# Patient Record
Sex: FEMALE | Race: BLACK OR AFRICAN AMERICAN | NOT HISPANIC OR LATINO | Employment: FULL TIME | URBAN - METROPOLITAN AREA
[De-identification: names, ages, dates, MRNs, and addresses within clinical notes are randomized per-mention and may not be internally consistent; named-entity substitution may affect disease eponyms.]

---

## 2018-01-30 ENCOUNTER — APPOINTMENT (OUTPATIENT)
Dept: RADIOLOGY | Facility: MEDICAL CENTER | Age: 23
End: 2018-01-30
Attending: EMERGENCY MEDICINE
Payer: COMMERCIAL

## 2018-01-30 ENCOUNTER — HOSPITAL ENCOUNTER (EMERGENCY)
Facility: MEDICAL CENTER | Age: 23
End: 2018-01-30
Attending: EMERGENCY MEDICINE
Payer: COMMERCIAL

## 2018-01-30 VITALS
WEIGHT: 173.28 LBS | HEIGHT: 67 IN | HEART RATE: 84 BPM | DIASTOLIC BLOOD PRESSURE: 81 MMHG | RESPIRATION RATE: 16 BRPM | OXYGEN SATURATION: 96 % | SYSTOLIC BLOOD PRESSURE: 128 MMHG | TEMPERATURE: 98.3 F | BODY MASS INDEX: 27.2 KG/M2

## 2018-01-30 DIAGNOSIS — O21.0 HYPEREMESIS GRAVIDARUM: ICD-10-CM

## 2018-01-30 LAB
ALBUMIN SERPL BCP-MCNC: 4.8 G/DL (ref 3.2–4.9)
ALBUMIN/GLOB SERPL: 1.7 G/DL
ALP SERPL-CCNC: 43 U/L (ref 30–99)
ALT SERPL-CCNC: 16 U/L (ref 2–50)
ANION GAP SERPL CALC-SCNC: 12 MMOL/L (ref 0–11.9)
APPEARANCE UR: CLEAR
AST SERPL-CCNC: 17 U/L (ref 12–45)
B-HCG SERPL-ACNC: ABNORMAL MIU/ML (ref 0–5)
BASOPHILS # BLD AUTO: 0.3 % (ref 0–1.8)
BASOPHILS # BLD: 0.05 K/UL (ref 0–0.12)
BILIRUB SERPL-MCNC: 0.7 MG/DL (ref 0.1–1.5)
BILIRUB UR QL STRIP.AUTO: NEGATIVE
BUN SERPL-MCNC: 8 MG/DL (ref 8–22)
CALCIUM SERPL-MCNC: 9.4 MG/DL (ref 8.5–10.5)
CHLORIDE SERPL-SCNC: 102 MMOL/L (ref 96–112)
CO2 SERPL-SCNC: 20 MMOL/L (ref 20–33)
COLOR UR: YELLOW
CREAT SERPL-MCNC: 0.7 MG/DL (ref 0.5–1.4)
CULTURE IF INDICATED INDCX: NO UA CULTURE
EOSINOPHIL # BLD AUTO: 0.03 K/UL (ref 0–0.51)
EOSINOPHIL NFR BLD: 0.2 % (ref 0–6.9)
ERYTHROCYTE [DISTWIDTH] IN BLOOD BY AUTOMATED COUNT: 38.5 FL (ref 35.9–50)
GLOBULIN SER CALC-MCNC: 2.8 G/DL (ref 1.9–3.5)
GLUCOSE SERPL-MCNC: 75 MG/DL (ref 65–99)
GLUCOSE UR STRIP.AUTO-MCNC: 500 MG/DL
HCT VFR BLD AUTO: 43.7 % (ref 37–47)
HGB BLD-MCNC: 15 G/DL (ref 12–16)
IMM GRANULOCYTES # BLD AUTO: 0.08 K/UL (ref 0–0.11)
IMM GRANULOCYTES NFR BLD AUTO: 0.6 % (ref 0–0.9)
KETONES UR STRIP.AUTO-MCNC: 80 MG/DL
LEUKOCYTE ESTERASE UR QL STRIP.AUTO: NEGATIVE
LYMPHOCYTES # BLD AUTO: 1.74 K/UL (ref 1–4.8)
LYMPHOCYTES NFR BLD: 12.2 % (ref 22–41)
MCH RBC QN AUTO: 28.9 PG (ref 27–33)
MCHC RBC AUTO-ENTMCNC: 34.3 G/DL (ref 33.6–35)
MCV RBC AUTO: 84.2 FL (ref 81.4–97.8)
MICRO URNS: ABNORMAL
MONOCYTES # BLD AUTO: 0.7 K/UL (ref 0–0.85)
MONOCYTES NFR BLD AUTO: 4.9 % (ref 0–13.4)
NEUTROPHILS # BLD AUTO: 11.71 K/UL (ref 2–7.15)
NEUTROPHILS NFR BLD: 81.8 % (ref 44–72)
NITRITE UR QL STRIP.AUTO: NEGATIVE
NRBC # BLD AUTO: 0 K/UL
NRBC BLD-RTO: 0 /100 WBC
NUMBER OF RH DOSES IND 8505RD: NORMAL
PH UR STRIP.AUTO: 6 [PH]
PLATELET # BLD AUTO: 315 K/UL (ref 164–446)
PMV BLD AUTO: 9.1 FL (ref 9–12.9)
POTASSIUM SERPL-SCNC: 3.8 MMOL/L (ref 3.6–5.5)
PROT SERPL-MCNC: 7.6 G/DL (ref 6–8.2)
PROT UR QL STRIP: NEGATIVE MG/DL
RBC # BLD AUTO: 5.19 M/UL (ref 4.2–5.4)
RBC UR QL AUTO: NEGATIVE
RH BLD: NORMAL
SODIUM SERPL-SCNC: 134 MMOL/L (ref 135–145)
SP GR UR STRIP.AUTO: 1.03
UROBILINOGEN UR STRIP.AUTO-MCNC: 0.2 MG/DL
WBC # BLD AUTO: 14.3 K/UL (ref 4.8–10.8)

## 2018-01-30 PROCEDURE — 36415 COLL VENOUS BLD VENIPUNCTURE: CPT

## 2018-01-30 PROCEDURE — 99285 EMERGENCY DEPT VISIT HI MDM: CPT

## 2018-01-30 PROCEDURE — 700105 HCHG RX REV CODE 258: Performed by: EMERGENCY MEDICINE

## 2018-01-30 PROCEDURE — 700111 HCHG RX REV CODE 636 W/ 250 OVERRIDE (IP): Performed by: EMERGENCY MEDICINE

## 2018-01-30 PROCEDURE — 86901 BLOOD TYPING SEROLOGIC RH(D): CPT

## 2018-01-30 PROCEDURE — 85025 COMPLETE CBC W/AUTO DIFF WBC: CPT

## 2018-01-30 PROCEDURE — 96375 TX/PRO/DX INJ NEW DRUG ADDON: CPT

## 2018-01-30 PROCEDURE — 81003 URINALYSIS AUTO W/O SCOPE: CPT

## 2018-01-30 PROCEDURE — 76817 TRANSVAGINAL US OBSTETRIC: CPT

## 2018-01-30 PROCEDURE — 96361 HYDRATE IV INFUSION ADD-ON: CPT

## 2018-01-30 PROCEDURE — 84702 CHORIONIC GONADOTROPIN TEST: CPT

## 2018-01-30 PROCEDURE — 80053 COMPREHEN METABOLIC PANEL: CPT

## 2018-01-30 PROCEDURE — 96374 THER/PROPH/DIAG INJ IV PUSH: CPT

## 2018-01-30 RX ORDER — METOCLOPRAMIDE HYDROCHLORIDE 5 MG/ML
10 INJECTION INTRAMUSCULAR; INTRAVENOUS ONCE
Status: COMPLETED | OUTPATIENT
Start: 2018-01-30 | End: 2018-01-30

## 2018-01-30 RX ORDER — ONDANSETRON 4 MG/1
4 TABLET, ORALLY DISINTEGRATING ORAL EVERY 8 HOURS PRN
Qty: 10 TAB | Refills: 0 | Status: SHIPPED | OUTPATIENT
Start: 2018-01-30 | End: 2023-01-18 | Stop reason: SDUPTHER

## 2018-01-30 RX ORDER — DEXTROSE, SODIUM CHLORIDE, SODIUM LACTATE, POTASSIUM CHLORIDE, AND CALCIUM CHLORIDE 5; .6; .31; .03; .02 G/100ML; G/100ML; G/100ML; G/100ML; G/100ML
INJECTION, SOLUTION INTRAVENOUS ONCE
Status: COMPLETED | OUTPATIENT
Start: 2018-01-30 | End: 2018-01-30

## 2018-01-30 RX ORDER — SODIUM CHLORIDE 9 MG/ML
1000 INJECTION, SOLUTION INTRAVENOUS ONCE
Status: COMPLETED | OUTPATIENT
Start: 2018-01-30 | End: 2018-01-30

## 2018-01-30 RX ORDER — ONDANSETRON 2 MG/ML
4 INJECTION INTRAMUSCULAR; INTRAVENOUS ONCE
Status: COMPLETED | OUTPATIENT
Start: 2018-01-30 | End: 2018-01-30

## 2018-01-30 RX ADMIN — ONDANSETRON 4 MG: 2 INJECTION INTRAMUSCULAR; INTRAVENOUS at 15:36

## 2018-01-30 RX ADMIN — SODIUM CHLORIDE, SODIUM LACTATE, POTASSIUM CHLORIDE, CALCIUM CHLORIDE AND DEXTROSE MONOHYDRATE: 5; 600; 310; 30; 20 INJECTION, SOLUTION INTRAVENOUS at 18:15

## 2018-01-30 RX ADMIN — SODIUM CHLORIDE 1000 ML: 9 INJECTION, SOLUTION INTRAVENOUS at 20:25

## 2018-01-30 RX ADMIN — METOCLOPRAMIDE 10 MG: 5 INJECTION, SOLUTION INTRAMUSCULAR; INTRAVENOUS at 18:15

## 2018-01-30 RX ADMIN — SODIUM CHLORIDE 1000 ML: 9 INJECTION, SOLUTION INTRAVENOUS at 15:36

## 2018-01-30 ASSESSMENT — PAIN SCALES - GENERAL: PAINLEVEL_OUTOF10: 6

## 2018-01-30 NOTE — ED TRIAGE NOTES
"Chief Complaint   Patient presents with   • Pregnancy     6/8 weeks, + spotting. with N/V and unable to keep food down for several days.    • N/V   • Cramping     Pt reports blood in vomit, states that is about \"2 teaspoons.\"   Blood pressure 139/86, pulse 95, temperature 36.8 °C (98.3 °F), resp. rate 18, height 1.702 m (5' 7\"), weight 78.6 kg (173 lb 4.5 oz), SpO2 97 %.    Pt informed of wait times. Educated on triage process.  Asked to return to triage RN for any new or worsening of symptoms. Thanked for patience.        "

## 2018-01-30 NOTE — ED PROVIDER NOTES
ED Provider Note    CHIEF COMPLAINT  Chief Complaint   Patient presents with   • Pregnancy     6/8 weeks, + spotting. with N/V and unable to keep food down for several days.    • N/V   • Cramping       HPI  Shy Buchanan is a 22 y.o. female G1, P0, approximately 16 weeks pregnant who presents with complaints of nausea, vomiting for the past 6 days, but has been able to keep some fluids down.  She has been feeling somewhat weak, lightheaded, and fatigue. The patient says she has an appointment see the pregnancy center but hasn't no prenatal care to this point. She says she is also having some spotting and some lower abdominal pain cramping after vomiting. She denies fever, chills, sore throat, cough, congestion, any difficulty breathing. She has had no diarrhea.    REVIEW OF SYSTEMS  See HPI for further details. All other systems are negative.     PAST MEDICAL HISTORY  Past Medical History:   Diagnosis Date   • Healthy adult        FAMILY HISTORY  Family History   Problem Relation Age of Onset   • Lung Disease Mother    • Hypertension Mother    • Lung Disease Father    • Lung Disease Sister    • Hypertension Sister        SOCIAL HISTORY  Social History     Social History   • Marital status: Single     Spouse name: N/A   • Number of children: N/A   • Years of education: N/A     Social History Main Topics   • Smoking status: Never Smoker   • Smokeless tobacco: Never Used   • Alcohol use Yes      Comment: occasional   • Drug use: No   • Sexual activity: Yes     Other Topics Concern   • Not on file     Social History Narrative   • No narrative on file       SURGICAL HISTORY  Past Surgical History:   Procedure Laterality Date   • APPENDECTOMY         CURRENT MEDICATIONS  Home Medications    **Home medications have not yet been reviewed for this encounter**         ALLERGIES  Allergies   Allergen Reactions   • Vicodin [Hydrocodone-Acetaminophen] Vomiting       PHYSICAL EXAM  VITAL SIGNS: /86   Pulse 95   Temp 36.8  "°C (98.3 °F)   Resp 18   Ht 1.702 m (5' 7\")   Wt 78.6 kg (173 lb 4.5 oz)   SpO2 97%   BMI 27.14 kg/m²   Constitutional: Awake, alert, in no acute distress, Non-toxic appearance.   HENT: Atraumatic. Bilateral external ears normal, mucous membranes dry, throat nonerythematous without exudates, nose is normal.  Eyes: PERRL, EOMI, conjunctiva moist, noninjected.  Neck: Nontender, Normal range of motion, No nuchal rigidity, No stridor.   Lymphatic: No lymphadenopathy noted.   Cardiovascular: Regular rate and rhythm, no murmurs, rubs, gallops.  Thorax & Lungs:  Good breath sounds bilaterally, no wheezes, rales, or retractions.  No chest tenderness.  Abdomen: Bowel sounds normal, Soft, nontender, nondistended, no rebound, guarding, masses.  Back: No CVA or spinal tenderness.  Extremities: Intact distal pulses, No edema, No tenderness.   Skin: Warm, Dry, No rashes.   Musculoskeletal: No joint swelling or tenderness.  Neurologic: Alert & oriented x 3, sensory and motor function normal. No focal deficits.   Psychiatric: Affect normal, Judgment normal, Mood normal.           RADIOLOGY/PROCEDURES  US-OB PELVIS TRANSVAGINAL   Final Result         1. Single living intrauterine pregnancy at  6 weeks 3 days estimated gestational age.      2. Heterogeneous area in the lower uterine segment could relate to a blood clot.      3. Probable right corpus luteal cyst.                COURSE & MEDICAL DECISION MAKING  Pertinent Labs & Imaging studies reviewed. (See chart for details)  The patient presents with the above complaints. Clinically she appears dehydrated, likely has hyperemesis gravidarum. IV is placed, she was given a bolus of normal saline, followed by a liter of D5 LR. After discussing the use of Zofran with the patient, and the controversy surrounding possible birth defects, patient was agreeable for dose of Zofran.   After receiving a liter normal saline, she remained nauseous. She was given Reglan 10 mg IV. Patient was " given a 3rd liter of normal saline as she had not urinated. Urine sample was obtained. CBC showed a white count of 14,300, 82% polys, he would open 15.0, chemistry sodium 134, due to 20, anion gap 12, renal function and liver function tests normal, Rh+, beta hCG Quant 77,975.8, urine shows 500 glucose, 80 ketones, otherwise negative. Transvaginal ultrasound shows a viable intrauterine pregnancy with estimated gestational age of 6 weeks, 3 days. On recheck, the patient was feeling much improved. She was tolerating ice chips and oral fluids.  Findings were discussed with the patient. She appears to have hyperemesis gravidarum. She is placed on Zofran for any further nausea. She is to follow strict pelvic rest. She is told to drink plenty of fluids, return to the ER for any worsening symptoms, recurrent vomiting, fever, abdominal pain, heavy bleeding, or any other problems.      FINAL IMPRESSION  1. Hyperemesis gravidarum  2.   3.         Electronically signed by: Devin Mahoney, 1/30/2018 3:11 PM

## 2018-01-31 NOTE — ED NOTES
Pt AOx4.  Pt given discharge instructions and pt verbalized understanding.  Pt ambulated to [facility].

## 2018-01-31 NOTE — DISCHARGE INSTRUCTIONS
Hyperemesis Gravidarum  Hyperemesis gravidarum is a severe form of nausea and vomiting that happens during pregnancy. Hyperemesis is worse than morning sickness. It may cause you to have nausea or vomiting all day for many days. It may keep you from eating and drinking enough food and liquids. Hyperemesis usually occurs during the first half (the first 20 weeks) of pregnancy. It often goes away once a woman is in her second half of pregnancy. However, sometimes hyperemesis continues through an entire pregnancy.   CAUSES   The cause of this condition is not completely known but is thought to be related to changes in the body's hormones when pregnant. It could be from the high level of the pregnancy hormone or an increase in estrogen in the body.   SIGNS AND SYMPTOMS   · Severe nausea and vomiting.  · Nausea that does not go away.  · Vomiting that does not allow you to keep any food down.  · Weight loss and body fluid loss (dehydration).  · Having no desire to eat or not liking food you have previously enjoyed.  DIAGNOSIS   Your health care provider will do a physical exam and ask you about your symptoms. He or she may also order blood tests and urine tests to make sure something else is not causing the problem.   TREATMENT   You may only need medicine to control the problem. If medicines do not control the nausea and vomiting, you will be treated in the hospital to prevent dehydration, increased acid in the blood (acidosis), weight loss, and changes in the electrolytes in your body that may harm the unborn baby (fetus). You may need IV fluids.   HOME CARE INSTRUCTIONS   · Only take over-the-counter or prescription medicines as directed by your health care provider.  · Try eating a couple of dry crackers or toast in the morning before getting out of bed.  · Avoid foods and smells that upset your stomach.  · Avoid fatty and spicy foods.  · Eat 5-6 small meals a day.  · Do not drink when eating meals. Drink between  meals.  · For snacks, eat high-protein foods, such as cheese.  · Eat or suck on things that have thelma in them. Thelma helps nausea.  · Avoid food preparation. The smell of food can spoil your appetite.  · Avoid iron pills and iron in your multivitamins until after 3-4 months of being pregnant. However, consult with your health care provider before stopping any prescribed iron pills.  SEEK MEDICAL CARE IF:   · Your abdominal pain increases.  · You have a severe headache.  · You have vision problems.  · You are losing weight.  SEEK IMMEDIATE MEDICAL CARE IF:   · You are unable to keep fluids down.  · You vomit blood.  · You have constant nausea and vomiting.  · You have excessive weakness.  · You have extreme thirst.  · You have dizziness or fainting.  · You have a fever or persistent symptoms for more than 2-3 days.  · You have a fever and your symptoms suddenly get worse.  MAKE SURE YOU:   · Understand these instructions.  · Will watch your condition.  · Will get help right away if you are not doing well or get worse.     This information is not intended to replace advice given to you by your health care provider. Make sure you discuss any questions you have with your health care provider.     Document Released: 12/18/2006 Document Revised: 10/08/2014 Document Reviewed: 07/30/2014  [employer] Interactive Patient Education ©2016 [employer]

## 2018-02-04 ENCOUNTER — HOSPITAL ENCOUNTER (EMERGENCY)
Facility: MEDICAL CENTER | Age: 23
End: 2018-02-04
Attending: EMERGENCY MEDICINE
Payer: COMMERCIAL

## 2018-02-04 VITALS
BODY MASS INDEX: 26.54 KG/M2 | DIASTOLIC BLOOD PRESSURE: 70 MMHG | SYSTOLIC BLOOD PRESSURE: 133 MMHG | RESPIRATION RATE: 16 BRPM | WEIGHT: 169.09 LBS | OXYGEN SATURATION: 100 % | HEART RATE: 80 BPM | HEIGHT: 67 IN | TEMPERATURE: 98.2 F

## 2018-02-04 DIAGNOSIS — Z3A.01 LESS THAN 8 WEEKS GESTATION OF PREGNANCY: ICD-10-CM

## 2018-02-04 DIAGNOSIS — R11.2 INTRACTABLE VOMITING WITH NAUSEA, UNSPECIFIED VOMITING TYPE: ICD-10-CM

## 2018-02-04 LAB
ALBUMIN SERPL BCP-MCNC: 4.1 G/DL (ref 3.2–4.9)
ALBUMIN/GLOB SERPL: 1.1 G/DL
ALP SERPL-CCNC: 54 U/L (ref 30–99)
ALT SERPL-CCNC: 20 U/L (ref 2–50)
ANION GAP SERPL CALC-SCNC: 8 MMOL/L (ref 0–11.9)
APPEARANCE UR: ABNORMAL
AST SERPL-CCNC: 16 U/L (ref 12–45)
B-HCG SERPL-ACNC: ABNORMAL MIU/ML (ref 0–10)
BACTERIA #/AREA URNS HPF: ABNORMAL /HPF
BASOPHILS # BLD AUTO: 0.4 % (ref 0–1.8)
BASOPHILS # BLD: 0.06 K/UL (ref 0–0.12)
BILIRUB SERPL-MCNC: 1 MG/DL (ref 0.1–1.5)
BILIRUB UR QL STRIP.AUTO: ABNORMAL
BUN SERPL-MCNC: 6 MG/DL (ref 8–22)
CALCIUM SERPL-MCNC: 9.8 MG/DL (ref 8.4–10.2)
CHLORIDE SERPL-SCNC: 102 MMOL/L (ref 96–112)
CO2 SERPL-SCNC: 24 MMOL/L (ref 20–33)
COLOR UR: YELLOW
CREAT SERPL-MCNC: 0.93 MG/DL (ref 0.5–1.4)
CULTURE IF INDICATED INDCX: YES UA CULTURE
EOSINOPHIL # BLD AUTO: 0.15 K/UL (ref 0–0.51)
EOSINOPHIL NFR BLD: 1 % (ref 0–6.9)
EPI CELLS #/AREA URNS HPF: ABNORMAL /HPF
ERYTHROCYTE [DISTWIDTH] IN BLOOD BY AUTOMATED COUNT: 36.9 FL (ref 35.9–50)
GLOBULIN SER CALC-MCNC: 3.6 G/DL (ref 1.9–3.5)
GLUCOSE SERPL-MCNC: 84 MG/DL (ref 65–99)
GLUCOSE UR STRIP.AUTO-MCNC: NEGATIVE MG/DL
HCT VFR BLD AUTO: 42.6 % (ref 37–47)
HGB BLD-MCNC: 15 G/DL (ref 12–16)
IMM GRANULOCYTES # BLD AUTO: 0.04 K/UL (ref 0–0.11)
IMM GRANULOCYTES NFR BLD AUTO: 0.3 % (ref 0–0.9)
KETONES UR STRIP.AUTO-MCNC: 15 MG/DL
LEUKOCYTE ESTERASE UR QL STRIP.AUTO: ABNORMAL
LIPASE SERPL-CCNC: <10 U/L (ref 7–58)
LYMPHOCYTES # BLD AUTO: 2.61 K/UL (ref 1–4.8)
LYMPHOCYTES NFR BLD: 17.6 % (ref 22–41)
MCH RBC QN AUTO: 28.7 PG (ref 27–33)
MCHC RBC AUTO-ENTMCNC: 35.2 G/DL (ref 33.6–35)
MCV RBC AUTO: 81.6 FL (ref 81.4–97.8)
MICRO URNS: ABNORMAL
MONOCYTES # BLD AUTO: 1 K/UL (ref 0–0.85)
MONOCYTES NFR BLD AUTO: 6.7 % (ref 0–13.4)
MUCOUS THREADS #/AREA URNS HPF: ABNORMAL /HPF
NEUTROPHILS # BLD AUTO: 10.99 K/UL (ref 2–7.15)
NEUTROPHILS NFR BLD: 74 % (ref 44–72)
NITRITE UR QL STRIP.AUTO: NEGATIVE
NRBC # BLD AUTO: 0 K/UL
NRBC BLD-RTO: 0 /100 WBC
PH UR STRIP.AUTO: 6 [PH]
PLATELET # BLD AUTO: 309 K/UL (ref 164–446)
PMV BLD AUTO: 8.9 FL (ref 9–12.9)
POTASSIUM SERPL-SCNC: 3.9 MMOL/L (ref 3.6–5.5)
PROT SERPL-MCNC: 7.7 G/DL (ref 6–8.2)
PROT UR QL STRIP: 30 MG/DL
RBC # BLD AUTO: 5.22 M/UL (ref 4.2–5.4)
RBC # URNS HPF: ABNORMAL /HPF
RBC UR QL AUTO: NEGATIVE
SODIUM SERPL-SCNC: 134 MMOL/L (ref 135–145)
SP GR UR REFRACTOMETRY: 1.03
UNIDENT CRYS URNS QL MICRO: ABNORMAL /HPF
WBC # BLD AUTO: 14.9 K/UL (ref 4.8–10.8)
WBC #/AREA URNS HPF: ABNORMAL /HPF

## 2018-02-04 PROCEDURE — 83690 ASSAY OF LIPASE: CPT

## 2018-02-04 PROCEDURE — 84702 CHORIONIC GONADOTROPIN TEST: CPT

## 2018-02-04 PROCEDURE — 85025 COMPLETE CBC W/AUTO DIFF WBC: CPT

## 2018-02-04 PROCEDURE — 96374 THER/PROPH/DIAG INJ IV PUSH: CPT

## 2018-02-04 PROCEDURE — 700105 HCHG RX REV CODE 258: Performed by: EMERGENCY MEDICINE

## 2018-02-04 PROCEDURE — 36415 COLL VENOUS BLD VENIPUNCTURE: CPT

## 2018-02-04 PROCEDURE — 99284 EMERGENCY DEPT VISIT MOD MDM: CPT

## 2018-02-04 PROCEDURE — 700111 HCHG RX REV CODE 636 W/ 250 OVERRIDE (IP): Performed by: EMERGENCY MEDICINE

## 2018-02-04 PROCEDURE — 80053 COMPREHEN METABOLIC PANEL: CPT

## 2018-02-04 PROCEDURE — 81001 URINALYSIS AUTO W/SCOPE: CPT

## 2018-02-04 PROCEDURE — 87086 URINE CULTURE/COLONY COUNT: CPT

## 2018-02-04 RX ORDER — METOCLOPRAMIDE 5 MG/1
5 TABLET ORAL 4 TIMES DAILY
Qty: 20 TAB | Refills: 0 | Status: SHIPPED | OUTPATIENT
Start: 2018-02-04 | End: 2018-02-04

## 2018-02-04 RX ORDER — PROMETHAZINE HYDROCHLORIDE 25 MG/1
25 TABLET ORAL EVERY 6 HOURS PRN
Qty: 20 TAB | Refills: 0 | Status: SHIPPED | OUTPATIENT
Start: 2018-02-04 | End: 2020-09-16

## 2018-02-04 RX ORDER — METOCLOPRAMIDE 5 MG/1
5 TABLET ORAL 4 TIMES DAILY
Qty: 20 TAB | Refills: 0 | Status: SHIPPED | OUTPATIENT
Start: 2018-02-04 | End: 2020-09-16

## 2018-02-04 RX ORDER — SODIUM CHLORIDE 9 MG/ML
1000 INJECTION, SOLUTION INTRAVENOUS ONCE
Status: COMPLETED | OUTPATIENT
Start: 2018-02-04 | End: 2018-02-04

## 2018-02-04 RX ORDER — METOCLOPRAMIDE HYDROCHLORIDE 5 MG/ML
10 INJECTION INTRAMUSCULAR; INTRAVENOUS ONCE
Status: COMPLETED | OUTPATIENT
Start: 2018-02-04 | End: 2018-02-04

## 2018-02-04 RX ORDER — CEPHALEXIN 500 MG/1
500 CAPSULE ORAL 3 TIMES DAILY
Qty: 9 CAP | Refills: 0 | Status: SHIPPED | OUTPATIENT
Start: 2018-02-04 | End: 2018-02-04

## 2018-02-04 RX ORDER — CEPHALEXIN 500 MG/1
500 CAPSULE ORAL 3 TIMES DAILY
Qty: 9 CAP | Refills: 0 | Status: SHIPPED | OUTPATIENT
Start: 2018-02-04 | End: 2018-02-07

## 2018-02-04 RX ADMIN — METOCLOPRAMIDE 10 MG: 5 INJECTION, SOLUTION INTRAMUSCULAR; INTRAVENOUS at 19:45

## 2018-02-04 RX ADMIN — SODIUM CHLORIDE 1000 ML: 9 INJECTION, SOLUTION INTRAVENOUS at 19:45

## 2018-02-05 NOTE — ED NOTES
"Chief Complaint   Patient presents with   • N/V     seen last week for c/o same   • Pregnancy     about 6 weeks pregnant   • Low Back Pain     /100   Pulse 84   Temp 36.6 °C (97.9 °F)   Resp 18   Ht 1.702 m (5' 7\")   Wt 76.7 kg (169 lb 1.5 oz)   LMP 12/26/2017   SpO2 99%   BMI 26.48 kg/m²       "

## 2018-02-05 NOTE — ED NOTES
Pt stated that she feels great, her vision is fine and dizziness and nausea is gone. Patient provided printed discharge instructions which included signs and symptoms to look out for, why to return to ER, and other follow up appointments to make. Patient provided prescriptions, information on medications, and how to . Patient stated they understand discharge instructions and had no further questions or concerns at this time. Patient discharged to home with friend. Patient ambulated out of ER with stable gait.

## 2018-02-05 NOTE — DISCHARGE INSTRUCTIONS
Nausea and Vomiting  Nausea is a sick feeling that often comes before throwing up (vomiting). Vomiting is a reflex where stomach contents come out of your mouth. Vomiting can cause severe loss of body fluids (dehydration). Children and elderly adults can become dehydrated quickly, especially if they also have diarrhea. Nausea and vomiting are symptoms of a condition or disease. It is important to find the cause of your symptoms.  CAUSES   · Direct irritation of the stomach lining. This irritation can result from increased acid production (gastroesophageal reflux disease), infection, food poisoning, taking certain medicines (such as nonsteroidal anti-inflammatory drugs), alcohol use, or tobacco use.  · Signals from the brain. These signals could be caused by a headache, heat exposure, an inner ear disturbance, increased pressure in the brain from injury, infection, a tumor, or a concussion, pain, emotional stimulus, or metabolic problems.  · An obstruction in the gastrointestinal tract (bowel obstruction).  · Illnesses such as diabetes, hepatitis, gallbladder problems, appendicitis, kidney problems, cancer, sepsis, atypical symptoms of a heart attack, or eating disorders.  · Medical treatments such as chemotherapy and radiation.  · Receiving medicine that makes you sleep (general anesthetic) during surgery.  DIAGNOSIS  Your caregiver may ask for tests to be done if the problems do not improve after a few days. Tests may also be done if symptoms are severe or if the reason for the nausea and vomiting is not clear. Tests may include:  · Urine tests.  · Blood tests.  · Stool tests.  · Cultures (to look for evidence of infection).  · X-rays or other imaging studies.  Test results can help your caregiver make decisions about treatment or the need for additional tests.  TREATMENT  You need to stay well hydrated. Drink frequently but in small amounts. You may wish to drink water, sports drinks, clear broth, or eat frozen  ice pops or gelatin dessert to help stay hydrated. When you eat, eating slowly may help prevent nausea. There are also some antinausea medicines that may help prevent nausea.  HOME CARE INSTRUCTIONS   · Take all medicine as directed by your caregiver.  · If you do not have an appetite, do not force yourself to eat. However, you must continue to drink fluids.  · If you have an appetite, eat a normal diet unless your caregiver tells you differently.  ¨ Eat a variety of complex carbohydrates (rice, wheat, potatoes, bread), lean meats, yogurt, fruits, and vegetables.  ¨ Avoid high-fat foods because they are more difficult to digest.  · Drink enough water and fluids to keep your urine clear or pale yellow.  · If you are dehydrated, ask your caregiver for specific rehydration instructions. Signs of dehydration may include:  ¨ Severe thirst.  ¨ Dry lips and mouth.  ¨ Dizziness.  ¨ Dark urine.  ¨ Decreasing urine frequency and amount.  ¨ Confusion.  ¨ Rapid breathing or pulse.  SEEK IMMEDIATE MEDICAL CARE IF:   · You have blood or brown flecks (like coffee grounds) in your vomit.  · You have black or bloody stools.  · You have a severe headache or stiff neck.  · You are confused.  · You have severe abdominal pain.  · You have chest pain or trouble breathing.  · You do not urinate at least once every 8 hours.  · You develop cold or clammy skin.  · You continue to vomit for longer than 24 to 48 hours.  · You have a fever.  MAKE SURE YOU:   · Understand these instructions.  · Will watch your condition.  · Will get help right away if you are not doing well or get worse.     This information is not intended to replace advice given to you by your health care provider. Make sure you discuss any questions you have with your health care provider.     Document Released: 12/18/2006 Document Revised: 03/11/2013 Document Reviewed: 05/16/2012  [employer] Patient Education ©2016 [employer]  Pregnancy  If you are planning on  getting pregnant, it is a good idea to make a preconception appointment with your caregiver to discuss having a healthy lifestyle before getting pregnant. This includes diet, weight, exercise, taking prenatal vitamins (especially folic acid, which helps prevent brain and spinal cord defects), avoiding alcohol, smoking and illegal drugs, medical problems (diabetes, convulsions), family history of genetic problems, working conditions, and immunizations. It is better to have knowledge of these things and do something about them before getting pregnant.  During your pregnancy, it is important to follow certain guidelines in order to have a healthy baby. It is very important to get good prenatal care and follow your caregiver's instructions. Prenatal care includes all the medical care you receive before your baby's birth. This helps to prevent problems during the pregnancy and childbirth.  HOME CARE INSTRUCTIONS   · Start your prenatal visits by the 12th week of pregnancy or earlier, if possible. At first, appointments are usually scheduled monthly. They become more frequent in the last 2 months before delivery. It is important that you keep your caregiver's appointments and follow your caregiver's instructions regarding medication use, exercise, and diet.  · During pregnancy, you are providing food for you and your baby. Eat a regular, well-balanced diet. Choose foods such as meat, fish, milk and other dairy products, vegetables, fruits, whole-grain breads and cereals. Your caregiver will inform you of the ideal weight gain depending on your current height and weight. Drink lots of liquids. Try to drink 8 glasses of water a day.  · Alcohol is associated with a number of birth defects including fetal alcohol syndrome. It is best to avoid alcohol completely. Smoking will cause low birth rate and prematurity. Use of alcohol and nicotine during your pregnancy also increases the chances that your child will be chemically  dependent later in their life and may contribute to SIDS (Sudden Infant Death Syndrome).  · Do not use illegal drugs.  · Only take prescription or over-the-counter medications that are recommended by your caregiver. Other medications can cause genetic and physical problems in the baby.  · Morning sickness can often be helped by keeping soda crackers at the bedside. Eat a few before getting up in the morning.  · A sexual relationship may be continued until near the end of pregnancy if there are no other problems such as early (premature) leaking of amniotic fluid from the membranes, vaginal bleeding, painful intercourse or belly (abdominal) pain.  · Exercise regularly. Check with your caregiver if you are unsure of the safety of some of your exercises.  · Do not use hot tubs, steam rooms or saunas. These increase the risk of fainting and hurting yourself and the baby. Swimming is OK for exercise. Get plenty of rest, including afternoon naps when possible, especially in the third trimester.  · Avoid toxic odors and chemicals.  · Do not wear high heels. They may cause you to lose your balance and fall.  · Do not lift over 5 pounds. If you do lift anything, lift with your legs and thighs, not your back.  · Avoid long trips, especially in the third trimester.  · If you have to travel out of the city or state, take a copy of your medical records with you.  SEEK IMMEDIATE MEDICAL CARE IF:   · You develop an unexplained oral temperature above 102° F (38.9° C), or as your caregiver suggests.  · You have leaking of fluid from the vagina. If leaking membranes are suspected, take your temperature and inform your caregiver of this when you call.  · There is vaginal spotting or bleeding. Notify your caregiver of the amount and how many pads are used.  · You continue to feel sick to your stomach (nauseous) and have no relief from remedies suggested, or you throw up (vomit) blood or coffee ground like materials.  · You develop  upper abdominal pain.  · You have round ligament discomfort in the lower abdominal area. This still must be evaluated by your caregiver.  · You feel contractions of the uterus.  · You do not feel the baby move, or there is less movement than before.  · You have painful urination.  · You have abnormal vaginal discharge.  · You have persistent diarrhea.  · You get a severe headache.  · You have problems with your vision.  · You develop muscle weakness.  · You feel dizzy and faint.  · You develop shortness of breath.  · You develop chest pain.  · You have back pain that travels down to your leg and feet.  · You feel irregular or a very fast heartbeat.  · You develop excessive weight gain in a short period of time (5 pounds in 3 to 5 days).  · You are involved in a domestic violence situation.  Document Released: 12/18/2006 Document Revised: 06/18/2013 Document Reviewed: 06/11/2010  Pager® Patient Information ©2014 [employer]

## 2018-02-05 NOTE — ED NOTES
Pt in bed c/o n/v, unable to keep food or fluid down for past 4 days, blurred vision and dizziness. Pt is about 7wks pregnant with 1st pregnancy and was seen in ER for same concerns earlier this week. Prescribed zofran not working. ERP in to see pt. IV started, blood samples saundra lab and urine collected. Pt medicated per MAR.

## 2018-02-05 NOTE — ED PROVIDER NOTES
"ED Provider Note    CHIEF COMPLAINT  Chief Complaint   Patient presents with   • N/V     seen last week for c/o same   • Pregnancy     about 6 weeks pregnant   • Low Back Pain       HPI  Shy Buchanan is a 22 y.o. female who presents To the wrist, with persistent nausea and vomiting of food intolerance. Past medical history is benign.  at approximately 6 weeks. She was evaluated within this last week with similar complaint. At that time she had ultrasound confirming IUP and was discharged with Zofran. She doesn't the Zofran at home has done little to help her symptoms. She has had recurrent vomiting and has persistently had a dizziness with ambulation. She notes that the vaginal spotting is since stopped. No urinary symptoms. No diarrhea or constipation. Decreased urination and stool output which she believes is secondary to lack of by mouth intake or tolerance. No fever or chills. No abdominal pain.    REVIEW OF SYSTEMS  See HPI for further details. All other systems are negative.     PAST MEDICAL HISTORY   has a past medical history of Healthy adult and Hypertension.    SOCIAL HISTORY  Social History     Social History Main Topics   • Smoking status: Never Smoker   • Smokeless tobacco: Never Used   • Alcohol use No   • Drug use: No   • Sexual activity: Yes       SURGICAL HISTORY   has a past surgical history that includes appendectomy.    CURRENT MEDICATIONS  Home Medications    **Home medications have not yet been reviewed for this encounter**         ALLERGIES  Allergies   Allergen Reactions   • Vicodin [Hydrocodone-Acetaminophen] Vomiting       PHYSICAL EXAM  VITAL SIGNS: /70   Pulse 80   Temp 36.8 °C (98.2 °F)   Resp 16   Ht 1.702 m (5' 7\")   Wt 76.7 kg (169 lb 1.5 oz)   LMP 2017   SpO2 100%   BMI 26.48 kg/m²  @ROLY[[id number]::@   Pulse ox interpretation: I interpret this pulse ox as normal.  Constitutional: Alert in no apparent distress.  HENT: No signs of trauma, Bilateral external ears " normal, Nose normal.   Eyes: Pupils are equal and reactive, Conjunctiva normal, Non-icteric.   Neck: Normal range of motion, No tenderness, Supple, No stridor.   Cardiovascular: Regular rate and rhythm, no murmurs.   Thorax & Lungs: Normal breath sounds, No respiratory distress, No wheezing, No chest tenderness.   Abdomen: Bowel sounds normal, Soft, No tenderness, No masses, No pulsatile masses. No peritoneal signs.  Skin: Warm, Dry, No erythema, No rash.   Back: No bony tenderness, No CVA tenderness.   Extremities: Intact distal pulses, No edema, No tenderness, No cyanosis  Musculoskeletal: Good range of motion in all major joints. No tenderness to palpation or major deformities noted.   Neurologic: Alert , Normal motor function, Normal sensory function, No focal deficits noted.   Psychiatric: Affect normal, Judgment normal, Mood normal.       DIAGNOSTIC STUDIES / PROCEDURES      LABS  Results for orders placed or performed during the hospital encounter of 02/04/18   CBC WITH DIFFERENTIAL   Result Value Ref Range    WBC 14.9 (H) 4.8 - 10.8 K/uL    RBC 5.22 4.20 - 5.40 M/uL    Hemoglobin 15.0 12.0 - 16.0 g/dL    Hematocrit 42.6 37.0 - 47.0 %    MCV 81.6 81.4 - 97.8 fL    MCH 28.7 27.0 - 33.0 pg    MCHC 35.2 (H) 33.6 - 35.0 g/dL    RDW 36.9 35.9 - 50.0 fL    Platelet Count 309 164 - 446 K/uL    MPV 8.9 (L) 9.0 - 12.9 fL    Neutrophils-Polys 74.00 (H) 44.00 - 72.00 %    Lymphocytes 17.60 (L) 22.00 - 41.00 %    Monocytes 6.70 0.00 - 13.40 %    Eosinophils 1.00 0.00 - 6.90 %    Basophils 0.40 0.00 - 1.80 %    Immature Granulocytes 0.30 0.00 - 0.90 %    Nucleated RBC 0.00 /100 WBC    Neutrophils (Absolute) 10.99 (H) 2.00 - 7.15 K/uL    Lymphs (Absolute) 2.61 1.00 - 4.80 K/uL    Monos (Absolute) 1.00 (H) 0.00 - 0.85 K/uL    Eos (Absolute) 0.15 0.00 - 0.51 K/uL    Baso (Absolute) 0.06 0.00 - 0.12 K/uL    Immature Granulocytes (abs) 0.04 0.00 - 0.11 K/uL    NRBC (Absolute) 0.00 K/uL   COMP METABOLIC PANEL   Result Value Ref  Range    Sodium 134 (L) 135 - 145 mmol/L    Potassium 3.9 3.6 - 5.5 mmol/L    Chloride 102 96 - 112 mmol/L    Co2 24 20 - 33 mmol/L    Anion Gap 8.0 0.0 - 11.9    Glucose 84 65 - 99 mg/dL    Bun 6 (L) 8 - 22 mg/dL    Creatinine 0.93 0.50 - 1.40 mg/dL    Calcium 9.8 8.4 - 10.2 mg/dL    AST(SGOT) 16 12 - 45 U/L    ALT(SGPT) 20 2 - 50 U/L    Alkaline Phosphatase 54 30 - 99 U/L    Total Bilirubin 1.0 0.1 - 1.5 mg/dL    Albumin 4.1 3.2 - 4.9 g/dL    Total Protein 7.7 6.0 - 8.2 g/dL    Globulin 3.6 (H) 1.9 - 3.5 g/dL    A-G Ratio 1.1 g/dL   LIPASE   Result Value Ref Range    Lipase <10 7 - 58 U/L   HCG QUANTITATIVE SERUM   Result Value Ref Range    Bhcg 848631.0 (H) 0.0 - 10.0 mIU/mL   URINALYSIS,CULTURE IF INDICATED   Result Value Ref Range    Micro Urine Req Microscopic     Color Yellow     Character Sl Cloudy (A)     Ph 6.0 5.0 - 8.0    Glucose Negative Negative mg/dL    Ketones 15 (A) Negative mg/dL    Protein 30 (A) Negative mg/dL    Bilirubin Moderate (A) Negative    Nitrite Negative Negative    Leukocyte Esterase Trace (A) Negative    Occult Blood Negative Negative    Culture Indicated Yes UA Culture   ESTIMATED GFR   Result Value Ref Range    GFR If African American >60 >60 mL/min/1.73 m 2    GFR If Non African American >60 >60 mL/min/1.73 m 2   REFRACTOMETER SG   Result Value Ref Range    Specific Gravity 1.029    URINE MICROSCOPIC (W/UA)   Result Value Ref Range    WBC 10-20 (A) /hpf    RBC 2-5 (A) /hpf    Bacteria Moderate (A) None /hpf    Epithelial Cells Moderate (A) Few /hpf    Mucous Threads Moderate /hpf    Urine Crystals Few Amorphous /hpf             COURSE & MEDICAL DECISION MAKING  Pertinent Labs & Imaging studies reviewed. (See chart for details)  Patient presented to the emergency department again for persistent nausea and vomiting while pregnant. Patient currently approximately 6 weeks pregnant. This is her 1st pregnancy. No relief with Zofran at home. Repeat lab evaluation was completed and on  comparison she does not have any significant clinical worsening or changes. Renal function remains adequate. No significant ketonuria. No significant weight loss by history. Patient now tolerating by mouth fluids after Reglan administration here. I will discharge her to home with both Phenergan and Reglan for ongoing antiemetic needs. Additionally do think this will be important to avoid Zofran while in the 1st trimester as is currently controversial. Patient has in the interim established local primary ObGyn through [facility]. She does have upcoming appointment this week with her which I feel is appropriate. She is understanding of return precautions the ER if needed.      The patient will not drink alcohol nor drive with prescribed medications. The patient will return for worsening symptoms and is stable at the time of discharge. The patient verbalizes understanding and will comply.    FINAL IMPRESSION  1. Intractable vomiting with nausea, unspecified vomiting type    2. Less than 8 weeks gestation of pregnancy            Electronically signed by: Eduardo Guerrero, 2/4/2018 7:25 PM

## 2018-02-05 NOTE — ED NOTES
ERP back to see pt. Pt states she feels much better, given water and apple juice and told to started with a few small sips.

## 2018-02-06 LAB
BACTERIA UR CULT: NORMAL
SIGNIFICANT IND 70042: NORMAL
SITE SITE: NORMAL
SOURCE SOURCE: NORMAL

## 2018-07-01 ENCOUNTER — OFFICE VISIT (OUTPATIENT)
Dept: URGENT CARE | Facility: PHYSICIAN GROUP | Age: 23
End: 2018-07-01
Payer: COMMERCIAL

## 2018-07-01 VITALS
HEIGHT: 68 IN | WEIGHT: 163 LBS | BODY MASS INDEX: 24.71 KG/M2 | TEMPERATURE: 98.8 F | SYSTOLIC BLOOD PRESSURE: 130 MMHG | OXYGEN SATURATION: 97 % | HEART RATE: 85 BPM | DIASTOLIC BLOOD PRESSURE: 82 MMHG

## 2018-07-01 DIAGNOSIS — L50.9 URTICARIA: Primary | ICD-10-CM

## 2018-07-01 PROCEDURE — 99204 OFFICE O/P NEW MOD 45 MIN: CPT | Performed by: PHYSICIAN ASSISTANT

## 2018-07-01 RX ORDER — TRIAMCINOLONE ACETONIDE 1 MG/G
1 CREAM TOPICAL 2 TIMES DAILY
Qty: 4 TUBE | Refills: 3 | Status: SHIPPED | OUTPATIENT
Start: 2018-07-01 | End: 2018-07-08

## 2018-07-01 RX ORDER — PREDNISONE 20 MG/1
TABLET ORAL
Qty: 23 TAB | Refills: 0 | Status: SHIPPED | OUTPATIENT
Start: 2018-07-01 | End: 2020-09-16

## 2018-07-01 NOTE — PROGRESS NOTES
Subjective:      Pt is a 22 y.o. female who presents with Rash            HPI  Pt notes she is allergic to 26 differing environmental triggers and has an appt to see an allergist in 1-2 weeks and notes a new full boy rash of hives x 3 days from an unknown trigger Pt has not taken any Rx medications for this condition. Pt states the pain is a 4/10 from itching, aching in nature and worse at night. Pt denies CP, SOB, NVD, paresthesias, headaches, dizziness, change in vision, hives, or other joint pain. The pt's medication list, problem list, and allergies have been evaluated and reviewed during today's visit.    PMH:  Past Medical History:   Diagnosis Date   • Healthy adult    • Hypertension        PSH:  Past Surgical History:   Procedure Laterality Date   • APPENDECTOMY         Fam Hx:    family history includes Hypertension in her mother and sister; Lung Disease in her father, mother, and sister.  Family Status   Relation Status   • Mother Alive   • Father Alive   • Sister Alive   • Brother Alive       Soc HX:  Social History     Social History   • Marital status: Single     Spouse name: N/A   • Number of children: N/A   • Years of education: N/A     Occupational History   • Not on file.     Social History Main Topics   • Smoking status: Never Smoker   • Smokeless tobacco: Never Used   • Alcohol use No   • Drug use: No   • Sexual activity: Yes     Other Topics Concern   • Not on file     Social History Narrative   • No narrative on file         Medications:    Current Outpatient Prescriptions:   •  predniSONE (DELTASONE) 20 MG Tab, Take 3 tabs at once PO daily x 5 days, then take 2 tabs at once daily x 3 days, then take 1 tab PO daily x 2 days, Disp: 23 Tab, Rfl: 0  •  triamcinolone acetonide (KENALOG) 0.1 % Cream, Apply 1 Application to affected area(s) 2 times a day for 7 days., Disp: 4 Tube, Rfl: 3  •  promethazine (PHENERGAN) 25 MG Tab, Take 1 Tab by mouth every 6 hours as needed for Nausea/Vomiting., Disp: 20  "Tab, Rfl: 0  •  metoclopramide (REGLAN) 5 MG tablet, Take 1 Tab by mouth 4 times a day., Disp: 20 Tab, Rfl: 0  •  ondansetron (ZOFRAN ODT) 4 MG TABLET DISPERSIBLE, Take 1 Tab by mouth every 8 hours as needed for Nausea., Disp: 10 Tab, Rfl: 0  •  oxycodone-acetaminophen (PERCOCET) 5-325 MG Tab, Take 1-2 Tabs by mouth every 6 hours as needed (MODERATE PAIN)., Disp: 15 Tab, Rfl: 0  •  cyclobenzaprine (FLEXERIL) 10 MG Tab, Take 1 Tab by mouth 3 times a day as needed for Muscle Spasms., Disp: 15 Tab, Rfl: 0      Allergies:  Hydrocodone-acetaminophen and Vicodin [hydrocodone-acetaminophen]    ROS  Constitutional: Negative for fever, chills and malaise/fatigue.   HENT: Negative for congestion and sore throat.    Eyes: Negative for blurred vision, double vision and photophobia.   Respiratory: Negative for cough and shortness of breath.    Cardiovascular: Negative for chest pain and palpitations.   Gastrointestinal: Negative for heartburn, nausea, vomiting, abdominal pain, diarrhea and constipation.   Genitourinary: Negative for dysuria and flank pain.   Musculoskeletal: Negative for joint pain and myalgias.   Skin: POS for itching and rash.   Neurological: Negative for dizziness, tingling and headaches.   Endo/Heme/Allergies: Does not bruise/bleed easily.   Psychiatric/Behavioral: Negative for depression. The patient is not nervous/anxious.           Objective:     /82   Pulse 85   Temp 37.1 °C (98.8 °F)   Ht 1.727 m (5' 8\")   Wt 73.9 kg (163 lb)   LMP 12/26/2017   SpO2 97%   BMI 24.78 kg/m²      Physical Exam   Skin: Skin is warm. Capillary refill takes less than 2 seconds. Rash noted. Rash is urticarial.              Constitutional: PT is oriented to person, place, and time. PT appears well-developed and well-nourished. No distress.   HENT:   Head: Normocephalic and atraumatic.   Mouth/Throat: Oropharynx is clear and moist. No oropharyngeal exudate.   Eyes: Conjunctivae normal and EOM are normal. Pupils are " equal, round, and reactive to light.   Neck: Normal range of motion. Neck supple. No thyromegaly present.   Cardiovascular: Normal rate, regular rhythm, normal heart sounds and intact distal pulses.  Exam reveals no gallop and no friction rub.    No murmur heard.  Pulmonary/Chest: Effort normal and breath sounds normal. No respiratory distress. PT has no wheezes. PT has no rales. Pt exhibits no tenderness.   Abdominal: Soft. Bowel sounds are normal. PT exhibits no distension and no mass. There is no tenderness. There is no rebound and no guarding.   Musculoskeletal: Normal range of motion. PT exhibits no edema and no tenderness.   Neurological: PT is alert and oriented to person, place, and time. PT has normal reflexes. No cranial nerve deficit.       Psychiatric: PT has a normal mood and affect. PT behavior is normal. Judgment and thought content normal.          Assessment/Plan:     1. Urticaria    - predniSONE (DELTASONE) 20 MG Tab; Take 3 tabs at once PO daily x 5 days, then take 2 tabs at once daily x 3 days, then take 1 tab PO daily x 2 days  Dispense: 23 Tab; Refill: 0  - triamcinolone acetonide (KENALOG) 0.1 % Cream; Apply 1 Application to affected area(s) 2 times a day for 7 days.  Dispense: 4 Tube; Refill: 3    Pt to follow with first allergist appt in 1-2 weeks  Rest, fluids encouraged.  AVS with medical info given.  Pt was in full understanding and agreement with the plan.  Follow-up as needed if symptoms worsen or fail to improve.

## 2019-03-06 ENCOUNTER — TELEPHONE (OUTPATIENT)
Dept: HEALTH INFORMATION MANAGEMENT | Facility: OTHER | Age: 24
End: 2019-03-06

## 2019-03-20 ENCOUNTER — GYNECOLOGY VISIT (OUTPATIENT)
Dept: OBGYN | Facility: CLINIC | Age: 24
End: 2019-03-20
Payer: COMMERCIAL

## 2019-03-20 ENCOUNTER — HOSPITAL ENCOUNTER (OUTPATIENT)
Facility: MEDICAL CENTER | Age: 24
End: 2019-03-20
Attending: OBSTETRICS & GYNECOLOGY
Payer: COMMERCIAL

## 2019-03-20 VITALS
WEIGHT: 143 LBS | HEIGHT: 68 IN | BODY MASS INDEX: 21.67 KG/M2 | SYSTOLIC BLOOD PRESSURE: 128 MMHG | DIASTOLIC BLOOD PRESSURE: 78 MMHG

## 2019-03-20 DIAGNOSIS — E28.2 PCOS (POLYCYSTIC OVARIAN SYNDROME): ICD-10-CM

## 2019-03-20 DIAGNOSIS — Z01.419 WELL WOMAN EXAM: ICD-10-CM

## 2019-03-20 DIAGNOSIS — Z12.4 SCREENING FOR CERVICAL CANCER: ICD-10-CM

## 2019-03-20 DIAGNOSIS — Z11.51 SPECIAL SCREENING EXAMINATION FOR HUMAN PAPILLOMAVIRUS (HPV): ICD-10-CM

## 2019-03-20 DIAGNOSIS — Z30.09 COUNSELING FOR BIRTH CONTROL REGARDING INTRAUTERINE DEVICE (IUD): ICD-10-CM

## 2019-03-20 PROCEDURE — 99385 PREV VISIT NEW AGE 18-39: CPT | Performed by: OBSTETRICS & GYNECOLOGY

## 2019-03-20 PROCEDURE — 87491 CHLMYD TRACH DNA AMP PROBE: CPT

## 2019-03-20 PROCEDURE — 88175 CYTOPATH C/V AUTO FLUID REDO: CPT

## 2019-03-20 PROCEDURE — 87591 N.GONORRHOEAE DNA AMP PROB: CPT

## 2019-03-20 PROCEDURE — 87624 HPV HI-RISK TYP POOLED RSLT: CPT
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
## 2019-03-21 ENCOUNTER — OFFICE VISIT (OUTPATIENT)
Dept: MEDICAL GROUP | Facility: LAB | Age: 24
End: 2019-03-21
Payer: COMMERCIAL

## 2019-03-21 VITALS
DIASTOLIC BLOOD PRESSURE: 76 MMHG | OXYGEN SATURATION: 99 % | SYSTOLIC BLOOD PRESSURE: 122 MMHG | HEART RATE: 76 BPM | HEIGHT: 68 IN | BODY MASS INDEX: 21.25 KG/M2 | RESPIRATION RATE: 18 BRPM | WEIGHT: 140.2 LBS | TEMPERATURE: 98.3 F

## 2019-03-21 DIAGNOSIS — R11.2 NON-INTRACTABLE VOMITING WITH NAUSEA, UNSPECIFIED VOMITING TYPE: ICD-10-CM

## 2019-03-21 DIAGNOSIS — G56.02 CARPAL TUNNEL SYNDROME OF LEFT WRIST: ICD-10-CM

## 2019-03-21 DIAGNOSIS — R21 RASH AND NONSPECIFIC SKIN ERUPTION: ICD-10-CM

## 2019-03-21 DIAGNOSIS — K58.2 IRRITABLE BOWEL SYNDROME WITH BOTH CONSTIPATION AND DIARRHEA: ICD-10-CM

## 2019-03-21 PROBLEM — M79.632 LEFT FOREARM PAIN: Status: ACTIVE | Noted: 2019-03-21

## 2019-03-21 PROCEDURE — 99214 OFFICE O/P EST MOD 30 MIN: CPT | Performed by: NURSE PRACTITIONER

## 2019-03-21 ASSESSMENT — PATIENT HEALTH QUESTIONNAIRE - PHQ9: CLINICAL INTERPRETATION OF PHQ2 SCORE: 0

## 2019-03-21 NOTE — LETTER
[facility]  LAWR[place]  [address] [address]  [place] [address]  Fax: [phone]   Authorization for Release/Disclosure of   Protected Health Information   Name: SHY DANIELLE : 1995 SSN: [id number]   Address: [address] [place] [address]   [place] [address] Phone:    [phone] (home)    I authorize the entity listed below to release/disclose the PHI below to:   [facility]/LAWRENCE Grover and LAWRENCE Grover   Provider or Entity Name:     Address   City, State, Zip   Phone:      Fax:     Reason for request: continuity of care   Information to be released:    [  ] LAST COLONOSCOPY,  including any PATH REPORT and follow-up  [  ] LAST FIT/COLOGUARD RESULT [  ] LAST DEXA  [  ] LAST MAMMOGRAM  [  ] LAST PAP  [  ] LAST LABS [  ] RETINA EXAM REPORT  [  ] IMMUNIZATION RECORDS  [x] Release all info      [  ] Check here and initial the line next to each item to release ALL health information INCLUDING  _____ Care and treatment for drug and / or alcohol abuse  _____ HIV testing, infection status, or AIDS  _____ Genetic Testing    DATES OF SERVICE OR TIME PERIOD TO BE DISCLOSED: _____________  I understand and acknowledge that:  * This Authorization may be revoked at any time by you in writing, except if your health information has already been used or disclosed.  * Your health information that will be used or disclosed as a result of you signing this authorization could be re-disclosed by the recipient. If this occurs, your re-disclosed health information may no longer be protected by State or Federal laws.  * You may refuse to sign this Authorization. Your refusal will not affect your ability to obtain treatment.  * This Authorization becomes effective upon signing and will  on (date) __________.      If no date is indicated, this Authorization will  one (1) year from the signature date.    Name: Shy Danielle    Signature:   Date:     3/21/2019            PLEASE FAX REQUESTED RECORDS BACK TO: [phone]

## 2019-03-21 NOTE — ASSESSMENT & PLAN NOTE
This is a chronic problem for this patient which started when she was 9 years old.  She reports that she spends hours daily on the toilet with both constipation and diarrhea. Describes current pattern of bowel movements including having constipation followed by diarrhea and abdominal pain.  She reports the pain and bloating and pressure feeling are relieved after the bowel movements.    No alarming signs or symptoms including onset of symptoms before the age of 50. Patient denies weight loss, fever, overt or occult blood in stool, frequent nocturnal bowel movements, recent abnormal laboratory tests.  There is no family history of inflammatory bowel disease, family history or early colon cancer.     Past work up: She reports that she has had 2 colonoscopies in her life and that they have never found anything other than a tortuous colon.    Current therapies: She uses medical marijuana and CBD oil to help with the pain of her bowel movements.

## 2019-03-21 NOTE — PATIENT INSTRUCTIONS
Nutrition: Avoid food triggers. Recommend fiber intake of 25 to 35 g/day - soluble fiber preferred (e.g., psyllium, ispaghula, inulin, and modified citrus pectin), as insoluble fiber (corn, wheat bran) may worsen IBS symptoms in some cases.     Probiotics: Recommend a mixture of 50/50 L. plantarum/B. breve at 25 billion colony-forming units (cfu) twice daily for 6 to 8 weeks; then decrease to 10 billion cfu/day.     Exercise: Incorporating a moderate regular physical exercise routine into your schedule as this has been demonstrated to improve stress-coping, enhance well-being, and decrease feelings of depression and anxiety. Physical activity also decreases bowel transit time and increases frequency of bowel movements, and structured exercise programs have been shown to improve overall IBS symptoms.    Sleep: Poor sleep quality is observed in patients with IBS, further compromising their quality of life. You can follow good “sleep hygiene” to improve sleep. That means that you:  ?Sleep only long enough to feel rested and then get out of bed  ?Go to bed and get up at the same time every day  ?Do not try to force yourself to sleep. If you can't sleep, get out of bed and try again later.  ?Have coffee, tea, and other foods that have caffeine only in the morning  ?Avoid alcohol in the late afternoon, evening, and bedtime  ?Avoid smoking, especially in the evening  ?Keep your bedroom dark, cool, quiet, and free of reminders of work or other things that cause you stress  ?Solve problems you have before you go to bed  ?Exercise several days a week, but not right before bed  ?Avoid looking at phones or reading devices (“e-books”) that give off light before bed. This can make it harder to fall asleep.    Other things that can improve sleep include:  ?Relaxation therapy, in which you focus on relaxing all the muscles in your body 1 by 1  ?Working with a counselor or psychologist to deal with the problems that might be causing  poor sleep.    .

## 2019-03-21 NOTE — PROGRESS NOTES
CC:Diagnoses of Non-intractable vomiting with nausea, unspecified vomiting type, Irritable bowel syndrome with both constipation and diarrhea, Carpal tunnel syndrome of left wrist, and Rash and nonspecific skin eruption were pertinent to this visit.    HISTORY OF PRESENT ILLNESS: Shy Buchanan is a 23 y.o. female established patient who presents today to discuss the following problems:    Irritable bowel syndrome with both constipation and diarrhea    This is a chronic problem for this patient which started when she was 9 years old.  She reports that she spends hours daily on the toilet with both constipation and diarrhea. Describes current pattern of bowel movements including having constipation followed by diarrhea and abdominal pain.  She reports the pain and bloating and pressure feeling are relieved after the bowel movements.No alarming signs or symptoms including onset of symptoms before the age of 49. Patient denies weight loss, fever, overt or occult blood in stool, frequent nocturnal bowel movements, recent abnormal laboratory tests.  There is no family history of inflammatory bowel disease, family history or early colon cancer. Past work up: She reports that she has had 2 colonoscopies in her life and that they have never found anything other than a tortuous colon. Current therapies: She uses medical marijuana and CBD oil to help with the pain of her bowel movements.  She has had allergy testing and reports that she is allergic to like 20 different foods and has completely changed her diet to try to help alleviate some of her problems.    Vomiting  This is a recurrent problem for this patient that has been happening on and off for several months.  It is intermittent.  She reports that she sometimes believes this is connected to her bowel problems as when she is on the toilet for so long sometimes will vomit.  She also reports that she sometimes vomits during sexual intercourse.  She does report a lot of  stress in her life and is unsure whether this might play a part.  Patient does smoke marijuana and use CBD oil to help alleviate the pain with her IBS.  We have discussed a trial of stopping these to see if it helps with the vomiting.    Rash and nonspecific skin eruption  This is a recurrent problem for this patient.  Patient does not have a rash today however as there are areas of hypopigmentation on her face which she reports is from using the steroid creams that have been prescribed for the rash that she has had in the past.  She does not want to use steroid creams because of what it does to her cosmetically.  She states that she has been worked up for lupus although we do not have those records.  We will try to get those records today.  She states that she gets the rash in all portions of her body including her face and had a previous Derm referral but her insurance denied it.  She has no insurance today and would like a referral placed for this.    Carpal tunnel syndrome of left wrist  This is a new problem for this patient.  Patient reports for the last month she has been experiencing intermittent shooting sensations in her wrists and forearm of her left hand.  She states that this seems to be made worse by typing and is relieved by rest.  She is tried ibuprofen and this seems to have helped.  We have discussed using wrist splint at night to keep the wrist in a neutral position while she sleeps and see if this helps.      Health Maintenance: Completed    Patient Active Problem List    Diagnosis Date Noted   • Sinusitis 12/30/2014     Priority: High   • Non-intractable vomiting with nausea 03/21/2019   • Carpal tunnel syndrome of left wrist 03/21/2019   • Rash and nonspecific skin eruption 03/21/2019   • Irritable bowel syndrome with both constipation and diarrhea 03/21/2019   • Headache 12/31/2014   • Leukocytosis 12/31/2014        Allergies:Hydrocodone-acetaminophen and Vicodin  [hydrocodone-acetaminophen]    Current Outpatient Prescriptions   Medication Sig Dispense Refill   • predniSONE (DELTASONE) 20 MG Tab Take 3 tabs at once PO daily x 5 days, then take 2 tabs at once daily x 3 days, then take 1 tab PO daily x 2 days (Patient not taking: Reported on 3/21/2019) 23 Tab 0   • promethazine (PHENERGAN) 25 MG Tab Take 1 Tab by mouth every 6 hours as needed for Nausea/Vomiting. 20 Tab 0   • metoclopramide (REGLAN) 5 MG tablet Take 1 Tab by mouth 4 times a day. (Patient not taking: Reported on 3/21/2019) 20 Tab 0   • ondansetron (ZOFRAN ODT) 4 MG TABLET DISPERSIBLE Take 1 Tab by mouth every 8 hours as needed for Nausea. (Patient not taking: Reported on 3/21/2019) 10 Tab 0   • oxycodone-acetaminophen (PERCOCET) 5-325 MG Tab Take 1-2 Tabs by mouth every 6 hours as needed (MODERATE PAIN). 15 Tab 0   • cyclobenzaprine (FLEXERIL) 10 MG Tab Take 1 Tab by mouth 3 times a day as needed for Muscle Spasms. 15 Tab 0     No current facility-administered medications for this visit.        Social History   Substance Use Topics   • Smoking status: Never Smoker   • Smokeless tobacco: Never Used   • Alcohol use No     Social History     Social History Narrative   • No narrative on file       Family History   Problem Relation Age of Onset   • Lung Disease Mother    • Hypertension Mother    • Lung Disease Father    • Lung Disease Sister    • Hypertension Sister        ROS:     Constitutional:  Negative for fever, chills, unexpected weight change, and fatigue/generalized weakness.  HEENT:  Negative for headaches, vision changes, hearing changes, ear pain, ear discharge, rhinorrhea, sinus congestion, sore throat, and neck pain.    Respiratory: Negative for cough, sputum production, chest congestion, dyspnea, wheezing, and crackles.    Cardiovascular:Negative for chest pain, palpitations, orthopnea, and bilateral lower extremity edema.   Gastrointestinal:Negative for heartburn, nausea, vomiting, abdominal pain,  "hematochezia, melena, diarrhea, constipation, and greasy/foul-smelling stools.   Genitourinary: Negative for dysuria, polyuria, hematuria, pyuria, urinary urgency, and urinary incontinence.   Musculoskeletal: Left forearm/wrist pain.  Negative for myalgias, back pain, and joint pain.   Skin: Negative for rash, itching, cyanotic skin color change.   Neurological: Negative for dizziness, tingling, tremors, focal sensory deficit, focal weakness and headaches.   Endo/Heme/Allergies: Does not bruise/bleed easily.   Psychiatric/Behavioral: Negative for depression, suicidal/homicidal ideation and memory loss.      - NOTE: All other systems reviewed and are negative, except as in HPI.      EXAM:   Blood pressure 122/76, pulse 76, temperature 36.8 °C (98.3 °F), temperature source Temporal, resp. rate 18, height 1.727 m (5' 8\"), weight 63.6 kg (140 lb 3.2 oz), last menstrual period 03/12/2019, SpO2 99 %. Body mass index is 21.32 kg/m².    Constitutional: Well-developed and well-nourished. Not diaphoretic. No distress.   Skin: Skin is warm and dry. No rash noted.  Head: Atraumatic without lesions.  Eyes: Conjunctivae and extraocular motions are normal. Pupils are equal, round, and reactive to light. No scleral icterus.   Ears:  External ears unremarkable. Tympanic membranes clear and intact.  Nose: Nares patent. Mucosa without edema or erythema. No discharge. No facial tenderness.  Mouth/Throat: Tongue normal. Oropharynx is clear and moist. Posterior pharynx without erythema or exudates.  Neck: Supple, trachea midline. No thyromegaly present. No cervical or supraclavicular lymphadenopathy.  Cardiovascular: Regular rate and rhythm.   Chest: Effort normal. Clear to auscultation throughout. No adventitious sounds.   Abdomen: Soft, non tender, and without distention. Active bowel sounds in all four quadrants. No rebound, guarding, masses or hepatosplenomegaly.  Extremities: No cyanosis, clubbing, erythema, nor edema. "   Neurological: Alert and oriented x 3. Sensation intact.   Psychiatric:  Behavior, mood, and affect are appropriate.       ASSESSMENT/PLAN:    1. Non-intractable vomiting with nausea, unspecified vomiting type  This is a recurrent unstable problem.  Trial of marijuana cessation discussed.  Patient is not agreeable to this at that this time as she believes that her pain will just increase.  We will draw labs and see if there is any all etiology of her problems.  In addition I have requested records from her prior provider at [facility] to see what workup they have done already as patient reports that she has had quite a bit of workup done.  - CBC WITH DIFFERENTIAL; Future  - Comp Metabolic Panel; Future    2. Irritable bowel syndrome with both constipation and diarrhea  Chronic unstable problem.   Recommend fiber intake of 25 to 35 g/day - soluble fiber preferred (e.g., psyllium, ispaghula, inulin, and modified citrus pectin), as insoluble fiber (corn, wheat bran) may worsen IBS symptoms in some cases.  She declines GI referral at this time would like to try taking Metamucil regularly and see if this helps at all.  We may consider a trial of Bentyl in the future if this does not work.  Also advised patient to reestablish working with a counselor or psychologist to deal with the problems that might be causing excess stress in her life as this seems to be contributing to some of her problems.      3. Carpal tunnel syndrome of left wrist  New unstable problem.  Patient would like to try using wrist brace at night to keep the hand in a neutral position.  Advised ice and ibuprofen as needed.    4. Rash and nonspecific skin eruption  Chronic recurrent problem.  - REFERRAL TO DERMATOLOGY      Return in about 4 weeks (around 4/18/2019) for Routine Follow up.       Please note that this dictation was created using voice recognition software. I have made every reasonable attempt to correct obvious errors, but I expect that  there are errors of grammar and possibly content that I did not discover before finalizing the note.

## 2019-03-22 LAB
C TRACH DNA GENITAL QL NAA+PROBE: NEGATIVE
CYTOLOGY REG CYTOL: NORMAL
HPV HR 12 DNA CVX QL NAA+PROBE: NEGATIVE
HPV16 DNA SPEC QL NAA+PROBE: NEGATIVE
HPV18 DNA SPEC QL NAA+PROBE: NEGATIVE
N GONORRHOEA DNA GENITAL QL NAA+PROBE: NEGATIVE
SPECIMEN SOURCE: NORMAL
SPECIMEN SOURCE: NORMAL

## 2019-04-22 ENCOUNTER — TELEPHONE (OUTPATIENT)
Dept: MEDICAL GROUP | Facility: LAB | Age: 24
End: 2019-04-22

## 2019-04-22 ENCOUNTER — APPOINTMENT (OUTPATIENT)
Dept: MEDICAL GROUP | Facility: LAB | Age: 24
End: 2019-04-22

## 2019-04-22 NOTE — TELEPHONE ENCOUNTER
Phone Number Called: [phone] (home)     Message: I spoke to Shy and let her know that yes she will need to be fasting to do her lab test. She rescheduled her appointment for Thursday. She will contact [facility] regarding her insurance change.    Left Message for patient to call back: N\A

## 2019-04-22 NOTE — TELEPHONE ENCOUNTER
VOICEMAIL today @ 11:42am  1. Caller Name: Shy                      Call Back Number: [phone] (home)     2. Message:Shy has an appointment today at 2pm and couldn't remember if she needed to be fasting and her insurance change. She was wondering if her Dermatology referral needed a new auth.    3. Patient approves office to leave a detailed voicemail/MyChart message: yes

## 2019-04-25 ENCOUNTER — TELEPHONE (OUTPATIENT)
Dept: MEDICAL GROUP | Facility: LAB | Age: 24
End: 2019-04-25

## 2019-04-29 ENCOUNTER — OFFICE VISIT (OUTPATIENT)
Dept: DERMATOLOGY | Facility: IMAGING CENTER | Age: 24
End: 2019-04-29
Payer: COMMERCIAL

## 2019-04-29 VITALS — TEMPERATURE: 98.4 F | WEIGHT: 142 LBS | BODY MASS INDEX: 21.52 KG/M2 | HEIGHT: 68 IN

## 2019-04-29 DIAGNOSIS — L30.8 OTHER ECZEMA: ICD-10-CM

## 2019-04-29 PROCEDURE — 99203 OFFICE O/P NEW LOW 30 MIN: CPT | Performed by: DERMATOLOGY

## 2019-04-29 RX ORDER — PIMECROLIMUS 10 MG/G
1 CREAM TOPICAL 2 TIMES DAILY
Qty: 30 G | Refills: 3 | Status: SHIPPED | OUTPATIENT
Start: 2019-04-29 | End: 2019-05-29

## 2019-04-29 ASSESSMENT — ENCOUNTER SYMPTOMS
HEADACHES: 1
CHILLS: 0
WEIGHT LOSS: 0
FEVER: 0
MYALGIAS: 1

## 2019-04-29 NOTE — PROGRESS NOTES
Dermatology New Patient Visit    Chief Complaint   Patient presents with   • Rash       Subjective:     HPI:   Shy Buchanan is a 23 y.o. female presenting for    HPI: rash  Flares intermittently on the face, chest, body  Onset: about 1.5 years ago  Symptoms: very itchy, red raised patches, dry  Aggravating factors: stress - not really with sun  Alleviating factors: steroid cream,  cbd & coconut oil  New creams/topicals: none  New medications (up to last 6 months): none  New travel: none  Other exposures: none - no moving, no new detergents, no new creams, notes sensitivity to metal necklaces  Treatments: triamcinolone cream however it is changing her skin color  Had a bad flare up in July of 2018 - went to ED for medrold ose pack - helped  Taking ??zyrtec semi-regularly  Took hydroxyzine, helped somewhat with symptoms  No mouth sores, but has fatigue, muscle aches, headaches - states is getting this worked up by PCP    Allergy testing done (not patch)  Does seem to react to her dog (flares)          Past Medical History:   Diagnosis Date   • Healthy adult    • Hypertension        Current Outpatient Prescriptions on File Prior to Visit   Medication Sig Dispense Refill   • predniSONE (DELTASONE) 20 MG Tab Take 3 tabs at once PO daily x 5 days, then take 2 tabs at once daily x 3 days, then take 1 tab PO daily x 2 days (Patient not taking: Reported on 3/21/2019) 23 Tab 0   • promethazine (PHENERGAN) 25 MG Tab Take 1 Tab by mouth every 6 hours as needed for Nausea/Vomiting. (Patient not taking: Reported on 4/29/2019) 20 Tab 0   • metoclopramide (REGLAN) 5 MG tablet Take 1 Tab by mouth 4 times a day. (Patient not taking: Reported on 3/21/2019) 20 Tab 0   • ondansetron (ZOFRAN ODT) 4 MG TABLET DISPERSIBLE Take 1 Tab by mouth every 8 hours as needed for Nausea. (Patient not taking: Reported on 3/21/2019) 10 Tab 0   • oxycodone-acetaminophen (PERCOCET) 5-325 MG Tab Take 1-2 Tabs by mouth every 6 hours as needed (MODERATE  "PAIN). (Patient not taking: Reported on 4/29/2019) 15 Tab 0   • cyclobenzaprine (FLEXERIL) 10 MG Tab Take 1 Tab by mouth 3 times a day as needed for Muscle Spasms. (Patient not taking: Reported on 4/29/2019) 15 Tab 0     No current facility-administered medications on file prior to visit.        Allergies   Allergen Reactions   • Hydrocodone-Acetaminophen Nausea   • Vicodin [Hydrocodone-Acetaminophen] Vomiting       Family History   Problem Relation Age of Onset   • Lung Disease Mother    • Hypertension Mother    • Lung Disease Father    • Lung Disease Sister    • Hypertension Sister        Social History     Social History   • Marital status: Single     Spouse name: N/A   • Number of children: N/A   • Years of education: N/A     Occupational History   • Not on file.     Social History Main Topics   • Smoking status: Never Smoker   • Smokeless tobacco: Never Used   • Alcohol use No   • Drug use: Yes     Types: Marijuana   • Sexual activity: Yes     Partners: Male     Other Topics Concern   • Not on file     Social History Narrative   • No narrative on file       Review of Systems   Constitutional: Positive for malaise/fatigue. Negative for chills, fever and weight loss.   Musculoskeletal: Positive for myalgias.   Skin: Positive for itching and rash.   Neurological: Positive for headaches.   All other systems reviewed and are negative.       Objective:     A focused cutaneous exam was completed including: scalp, hair, ears, face, eyelids, conjunctiva, lips, neck, chest breasts, abdomen, back, left and right upper extremities (including hands/digits and fingernails) with the following pertinent findings listed below. Remaining above-listed examined areas within normal limits / negative for rashes or lesions.    Temp 36.9 °C (98.4 °F)   Ht 1.727 m (5' 8\")   Wt 64.4 kg (142 lb)     Physical Exam   Constitutional: She is oriented to person, place, and time and well-developed, well-nourished, and in no distress.   HENT: "   Head: Normocephalic and atraumatic.       Eyes: Conjunctivae and lids are normal.   Neck: Normal range of motion.   Pulmonary/Chest: Effort normal.   Neurological: She is alert and oriented to person, place, and time.   Skin: Skin is warm and dry.        Psychiatric: Mood and affect normal.   Vitals reviewed.      DATA: none applicable to review    Assessment and Plan:     1. Eczema - ??urticaria (based on hx, not any active exam today) vs other  - start Elidel 1% cream BID to the face. S/e burning discussed. Black box warning of cancer discussed (given hypopigmentaton of the face 2/2 steroids)  - trial Eucrisa 2% ointment BID to mild, residual areas on the body  - recommend Zyrtec 10-20mg qhs. S/e drowsiness discussed. Titrate up to BID if tolerating w/o side effects  - rtc with flare on the body for re-evaluation/possible biopsy  - cont f/u with PCP - states she is having further w/u for fatigue (TSH was tested on outside labs -- pending further eval of rash/symptoms, consider w/u CTD)    Followup: Return in about 4 weeks (around 5/27/2019).    Sherry Medina M.D.

## 2019-05-29 ENCOUNTER — OFFICE VISIT (OUTPATIENT)
Dept: DERMATOLOGY | Facility: IMAGING CENTER | Age: 24
End: 2019-05-29
Payer: COMMERCIAL

## 2019-05-29 DIAGNOSIS — L70.0 ACNE VULGARIS: ICD-10-CM

## 2019-05-29 DIAGNOSIS — L20.84 INTRINSIC ECZEMA: ICD-10-CM

## 2019-05-29 PROBLEM — N80.9 ENDOMETRIOSIS: Status: ACTIVE | Noted: 2019-05-29

## 2019-05-29 PROCEDURE — 99213 OFFICE O/P EST LOW 20 MIN: CPT | Performed by: DERMATOLOGY

## 2019-05-29 RX ORDER — PIMECROLIMUS 10 MG/G
1 CREAM TOPICAL 2 TIMES DAILY
Qty: 30 G | Refills: 3 | Status: SHIPPED | OUTPATIENT
Start: 2019-05-29 | End: 2020-09-16

## 2019-05-29 NOTE — PROGRESS NOTES
Meds: I have reviewed medications relevant to my specialty.  PMHx: endometriosis, remainder reviewed in chart  PSHx: reviewed in chart  PFHx: grandmother - breast CA  Allergies: norco, vicodin    CC:f/u eczema, acne    HPI: Patient being seen for f/u eczema  Still active on body >> face (chest, side)  Eucrisa helped on the body, but only had a small tube  Did not  elidel 2/2 cost     Eczema hx:  Onset: >1.5 years ago  Symptoms: very itchy, red raised patches, dry  Aggravating factors: stress - not really with sun  Alleviating factors: steroid cream,  cbd & coconut oil  Other exposures: none - no moving, no new detergents, no new creams, notes sensitivity to metal necklaces; +family stress  Had a bad flare up in July of 2018 - went to ED for medrol ose pack - helped  Taking zyrtec semi-regularly  Took hydroxyzine, helped somewhat with symptoms    Acne - face  Getting worse  Cannot be on OCPs 2/2 endometriosis - management plan being decided in next couple weeks  Active on cheeks/chin  Deep, painful spots, leaving marks  Using aloe wash, coconut oil  No other treatments    No mouth sores, but has fatigue, muscle aches, headaches - states is getting this worked up by PCPv    DermPMHx:   History of skin cancer: No  History of precancers/actinic keratoses: No  History of blistering/severe sunburns:No  Family history of skin cancer:No    ROS: no fevers/chills. No itch.  No joint pain; all other systems reviewed & negative    PE: Gen:WDWN female in NAD   HEENT: normocephalic, atraumatic  Resp: No effort with breathing  Psych: Normal mood & affect  Lymph:not examined  Skin: A focused cutaneous exam was completed including: hair, ears, face, eyelids, lips, neck, chest, abdomen and back  with the following pertinent findings listed below. Remaining above-listed examined areas within normal limits / negative for rashes or lesions.      A/P:  1. Acne vulgaris  -  Discussed doxy vs spironolactone, r/b/a - she is going to  discuss with her gyn, will let me know  - will start retina 0.025% cream w/ above  - patient aware that these medications not safe during pregnancy, and has been advised to stop medication if she is to become pregnant    2. Intrinsic eczema  - cont eucrisa (rx sent) for mild/moderate areas on the body  - resent elidel for face per patient request  - reviewed good skin care routine/cleansing/moisturizing/etc    RTC: 3 months    Sherry Medina

## 2019-06-07 ENCOUNTER — OFFICE VISIT (OUTPATIENT)
Dept: MEDICAL GROUP | Facility: LAB | Age: 24
End: 2019-06-07
Payer: COMMERCIAL

## 2019-06-07 VITALS
TEMPERATURE: 98.5 F | HEART RATE: 74 BPM | BODY MASS INDEX: 21.28 KG/M2 | OXYGEN SATURATION: 95 % | SYSTOLIC BLOOD PRESSURE: 112 MMHG | DIASTOLIC BLOOD PRESSURE: 66 MMHG | RESPIRATION RATE: 16 BRPM | HEIGHT: 68 IN | WEIGHT: 140.4 LBS

## 2019-06-07 DIAGNOSIS — K58.2 IRRITABLE BOWEL SYNDROME WITH BOTH CONSTIPATION AND DIARRHEA: ICD-10-CM

## 2019-06-07 DIAGNOSIS — E22.1 HYPERPROLACTINEMIA (HCC): ICD-10-CM

## 2019-06-07 DIAGNOSIS — R11.2 NON-INTRACTABLE VOMITING WITH NAUSEA, UNSPECIFIED VOMITING TYPE: ICD-10-CM

## 2019-06-07 DIAGNOSIS — M25.50 GENERALIZED JOINT PAIN: ICD-10-CM

## 2019-06-07 DIAGNOSIS — K90.49 FOOD INTOLERANCE IN ADULT: ICD-10-CM

## 2019-06-07 PROBLEM — M25.512 PAIN OF BOTH SHOULDER JOINTS: Status: RESOLVED | Noted: 2019-06-07 | Resolved: 2019-06-07

## 2019-06-07 PROBLEM — M25.512 PAIN OF BOTH SHOULDER JOINTS: Status: ACTIVE | Noted: 2019-06-07

## 2019-06-07 PROBLEM — M25.511 PAIN OF BOTH SHOULDER JOINTS: Status: RESOLVED | Noted: 2019-06-07 | Resolved: 2019-06-07

## 2019-06-07 PROBLEM — M25.511 PAIN OF BOTH SHOULDER JOINTS: Status: ACTIVE | Noted: 2019-06-07

## 2019-06-07 PROBLEM — G56.02 CARPAL TUNNEL SYNDROME OF LEFT WRIST: Status: RESOLVED | Noted: 2019-03-21 | Resolved: 2019-06-07

## 2019-06-07 PROCEDURE — 99214 OFFICE O/P EST MOD 30 MIN: CPT | Performed by: NURSE PRACTITIONER

## 2019-06-07 NOTE — ASSESSMENT & PLAN NOTE
This is a new to me, chronic problem.  Patient reports that she has so many problems with foods that she often does any and lives with constant nausea and vomiting.  Also reports that many foods seem to cause excessive diarrhea.  She states that she was recommended by her dermatologist to see an allergist.  We will put that referral in today

## 2019-06-07 NOTE — ASSESSMENT & PLAN NOTE
This is a follow-up on problem the patient has been having for several months.  She has more significant nausea at this point.  The nausea is worse in the morning and evenings.  She does have a significant amount stress in her life with a brother who has a glioblastoma nonoperable.  She also uses  marijuana  CBD oil to help relieve her pain associated with IBS.  We have discussed cessation of marijuana to prior visit to see if this helps alleviate and patient reports the only thing that this did was increase her pain levels.  She has seen GI consultants in the past as a child and would like to be referred back to them.  She denies any hematochezia, melena, or fevers.

## 2019-06-07 NOTE — PROGRESS NOTES
CC:Diagnoses of Irritable bowel syndrome with both constipation and diarrhea, Non-intractable vomiting with nausea, unspecified vomiting type, Food intolerance in adult, and Generalized joint pain were pertinent to this visit.    HISTORY OF PRESENT ILLNESS: Shy Buchanan is a 23 y.o. female established patient who has multiple medical complaints of unknown origin.  She has been recently seen by gynecology and dermatology.  She would like to discuss the following problems today:      Non-intractable vomiting with nausea  This is a follow-up on problem the patient has been having for several months.  She has more significant nausea at this point.  The nausea is worse in the morning and evenings.  She does have a significant amount stress in her life with a brother who has a glioblastoma nonoperable.  She also uses  marijuana  CBD oil to help relieve her pain associated with IBS.  We have discussed cessation of marijuana to prior visit to see if this helps alleviate and patient reports the only thing that this did was increase her pain levels.  She has seen GI consultants in the past as a child and would like to be referred back to them.  She denies any hematochezia, melena, or fevers.    Food intolerance in adult  This is a new to me, chronic problem.  Patient reports that she has so many problems with foods that she often does any and lives with constant nausea and vomiting.  Also reports that many foods seem to cause excessive diarrhea.  She states that she was recommended by her dermatologist to see an allergist.  We will put that referral in today    Irritable bowel syndrome with both constipation and diarrhea  This is a chronic problem that patient is following up on today.  This started when she was 9 years old and she spends multiple hours a day sitting on the toilet with both constipation and diarrhea.  She feels the diarrhea is worse than the constipation.  She does also report abdominal pain and bloating  which is relieved after bowel movements.  She denies fever, overt or occult blood in the stool, nocturnal bowel movements.  There is no family history of inflammatory bowel disease.  Currently she is using medical marijuana and CBD oil to prevent the pain associated with this problem.  We have discussed referral back to GI today.      Health Maintenance: Completed  Health Maintenance Due   Topic Date Due   • IMM VARICELLA (CHICKENPOX) VACCINE (1 of 2 - 13+ 2-dose series) 08/26/2007   • IMM HPV VACCINE (1 - Female 3-dose series) 08/26/2009   • IMM DTaP/Tdap/Td Vaccine (1 - Tdap) 08/26/2013         Patient Active Problem List    Diagnosis Date Noted   • Sinusitis 12/30/2014     Priority: High   • Food intolerance in adult 06/07/2019   • Endometriosis 05/29/2019   • Intrinsic eczema 05/29/2019   • Non-intractable vomiting with nausea 03/21/2019   • Rash and nonspecific skin eruption 03/21/2019   • Irritable bowel syndrome with both constipation and diarrhea 03/21/2019   • Headache 12/31/2014        Allergies:Hydrocodone-acetaminophen and Vicodin [hydrocodone-acetaminophen]    Current Outpatient Prescriptions   Medication Sig Dispense Refill   • Crisaborole (EUCRISA) 2 % Ointment 1 Application by Apply externally route 2 Times a Day. 1 Tube 3   • promethazine (PHENERGAN) 25 MG Tab Take 1 Tab by mouth every 6 hours as needed for Nausea/Vomiting. 20 Tab 0   • pimecrolimus (ELIDEL) 1 % cream Apply 1 Application to affected area(s) 2 times a day. face (Patient not taking: Reported on 6/7/2019) 30 g 3   • predniSONE (DELTASONE) 20 MG Tab Take 3 tabs at once PO daily x 5 days, then take 2 tabs at once daily x 3 days, then take 1 tab PO daily x 2 days (Patient not taking: Reported on 3/21/2019) 23 Tab 0   • metoclopramide (REGLAN) 5 MG tablet Take 1 Tab by mouth 4 times a day. (Patient not taking: Reported on 3/21/2019) 20 Tab 0   • ondansetron (ZOFRAN ODT) 4 MG TABLET DISPERSIBLE Take 1 Tab by mouth every 8 hours as needed  "for Nausea. (Patient not taking: Reported on 3/21/2019) 10 Tab 0   • oxycodone-acetaminophen (PERCOCET) 5-325 MG Tab Take 1-2 Tabs by mouth every 6 hours as needed (MODERATE PAIN). (Patient not taking: Reported on 4/29/2019) 15 Tab 0   • cyclobenzaprine (FLEXERIL) 10 MG Tab Take 1 Tab by mouth 3 times a day as needed for Muscle Spasms. (Patient not taking: Reported on 4/29/2019) 15 Tab 0     No current facility-administered medications for this visit.        Social History   Substance Use Topics   • Smoking status: Never Smoker   • Smokeless tobacco: Never Used   • Alcohol use No     Social History     Social History Narrative   • No narrative on file       Family History   Problem Relation Age of Onset   • Lung Disease Mother    • Hypertension Mother    • Lung Disease Father    • Lung Disease Sister    • Hypertension Sister        ROS:     Denies any weight loss, fevers, fatigue, vision changes, sore throat, swollen glands, rashes, shortness of breath, chest pain, numbness, nausea,  dysuria, hematuria, muscle weakness, depression.  All other systems were reviewed and are negative.        EXAM:   /66 (BP Location: Right arm, Patient Position: Sitting, BP Cuff Size: Adult)   Pulse 74   Temp 36.9 °C (98.5 °F) (Temporal)   Resp 16   Ht 1.727 m (5' 8\")   Wt 63.7 kg (140 lb 6.4 oz)   SpO2 95%  Body mass index is 21.35 kg/m².    Gen:         Alert and oriented, No apparent distress.  Neck:       No Lymphadenopathy or Bruits.  Lungs:     Clear to auscultation bilaterally  CV:          Regular rate and rhythm. No murmurs, rubs or gallops.    Abd:      Non-tender, no guarding/rebound. Bowel sounds present.  No hepatosplenomegaly.  No hernias.             Ext:          No clubbing, cyanosis, edema.      ASSESSMENT/PLAN:    1. Irritable bowel syndrome with both constipation and diarrhea  Chronic uncontrolled.    - REFERRAL TO GASTROENTEROLOGY  - CELIAC DISEASE AB PANEL; Future  - REFERRAL TO ALLERGY    2. " Non-intractable vomiting with nausea, unspecified vomiting type  Chronic uncontrolled.  Labs unremarkable.  Advised marijuana cessation.  Patient has loss of appetite and have advised referral to GI for evaluation.  - REFERRAL TO GASTROENTEROLOGY  - CELIAC DISEASE AB PANEL; Future  - REFERRAL TO ALLERGY    3. Food intolerance in adult  Chronic controlled.    - CELIAC DISEASE AB PANEL; Future  - REFERRAL TO ALLERGY    4. Generalized joint pain  Chronic uncontrolled. Family history of RA.    - RHEUMATOID ARTHRITIS FACTOR; Future  - CRP QUANTITIVE (NON-CARDIAC); Future  - WESTERGREN SED RATE; Future  - CCP ANTIBODY; Future    Return in about 3 months (around 9/7/2019).       Please note that this dictation was created using voice recognition software. I have made every reasonable attempt to correct obvious errors, but I expect that there are errors of grammar and possibly content that I did not discover before finalizing the note.

## 2019-06-07 NOTE — ASSESSMENT & PLAN NOTE
This is a chronic problem that patient is following up on today.  This started when she was 9 years old and she spends multiple hours a day sitting on the toilet with both constipation and diarrhea.  She feels the diarrhea is worse than the constipation.  She does also report abdominal pain and bloating which is relieved after bowel movements.  She denies fever, overt or occult blood in the stool, nocturnal bowel movements.  There is no family history of inflammatory bowel disease.  Currently she is using medical marijuana and CBD oil to prevent the pain associated with this problem.  We have discussed referral back to GI today.

## 2019-08-28 ENCOUNTER — OFFICE VISIT (OUTPATIENT)
Dept: URGENT CARE | Facility: CLINIC | Age: 24
End: 2019-08-28
Payer: COMMERCIAL

## 2019-08-28 VITALS
TEMPERATURE: 98.7 F | DIASTOLIC BLOOD PRESSURE: 70 MMHG | RESPIRATION RATE: 16 BRPM | SYSTOLIC BLOOD PRESSURE: 122 MMHG | HEART RATE: 72 BPM | HEIGHT: 68 IN | OXYGEN SATURATION: 98 % | BODY MASS INDEX: 23.16 KG/M2 | WEIGHT: 152.8 LBS

## 2019-08-28 DIAGNOSIS — J30.9 ALLERGIC RHINITIS, UNSPECIFIED SEASONALITY, UNSPECIFIED TRIGGER: ICD-10-CM

## 2019-08-28 DIAGNOSIS — J01.00 ACUTE MAXILLARY SINUSITIS, RECURRENCE NOT SPECIFIED: ICD-10-CM

## 2019-08-28 DIAGNOSIS — J45.21 MILD INTERMITTENT ASTHMA WITH ACUTE EXACERBATION: ICD-10-CM

## 2019-08-28 PROCEDURE — 99214 OFFICE O/P EST MOD 30 MIN: CPT | Performed by: PHYSICIAN ASSISTANT

## 2019-08-28 RX ORDER — ALBUTEROL SULFATE 90 UG/1
2 AEROSOL, METERED RESPIRATORY (INHALATION) EVERY 6 HOURS PRN
Qty: 8.5 G | Refills: 0 | Status: SHIPPED | OUTPATIENT
Start: 2019-08-28 | End: 2020-09-16

## 2019-08-28 RX ORDER — AMOXICILLIN AND CLAVULANATE POTASSIUM 875; 125 MG/1; MG/1
1 TABLET, FILM COATED ORAL 2 TIMES DAILY
Qty: 10 TAB | Refills: 0 | Status: SHIPPED | OUTPATIENT
Start: 2019-08-28 | End: 2019-09-02

## 2019-08-28 RX ORDER — METHYLPREDNISOLONE 4 MG/1
TABLET ORAL
Qty: 1 KIT | Refills: 0 | Status: SHIPPED | OUTPATIENT
Start: 2019-08-28 | End: 2020-09-16

## 2019-08-28 RX ORDER — ALBUTEROL SULFATE 90 UG/1
2 AEROSOL, METERED RESPIRATORY (INHALATION) EVERY 6 HOURS PRN
COMMUNITY
End: 2021-04-22 | Stop reason: SDUPTHER

## 2019-08-29 NOTE — PROGRESS NOTES
Subjective:   Shy Forrester a 24 y.o. female who presents for Allergic Rhinitis (head pressure, runny nose, nausea, shortness of breath x 9 days)    Sinus Problem   This is a new problem. The current episode started 1 to 4 weeks ago (1.5 weeks ago). The problem has been gradually worsening since onset. There has been no fever. The pain is moderate. Associated symptoms include congestion, ear pain, headaches, shortness of breath, sinus pressure and sneezing. Pertinent negatives include no chills or coughing. Past treatments include oral decongestants and acetaminophen (Albuterol). The treatment provided no relief.       Review of Systems   Constitutional: Positive for malaise/fatigue. Negative for chills and fever.   HENT: Positive for congestion, ear pain, sinus pressure, sinus pain and sneezing.    Eyes: Negative for blurred vision and pain.   Respiratory: Positive for shortness of breath and wheezing. Negative for cough.    Cardiovascular: Negative for chest pain and palpitations.   Gastrointestinal: Negative for abdominal pain, constipation, diarrhea, nausea and vomiting.   Musculoskeletal: Negative for myalgias.   Skin: Negative for rash.   Neurological: Positive for headaches. Negative for dizziness.   Endo/Heme/Allergies: Positive for environmental allergies.        PMH:  has a past medical history of Healthy adult and Hypertension. She also has no past medical history of Asthma or Hyperlipidemia.  MEDS:   Current Outpatient Medications:   •  albuterol 108 (90 Base) MCG/ACT Aero Soln inhalation aerosol, Inhale 2 Puffs by mouth every 6 hours as needed for Shortness of Breath., Disp: , Rfl:   •  amoxicillin-clavulanate (AUGMENTIN) 875-125 MG Tab, Take 1 Tab by mouth 2 times a day for 5 days., Disp: 10 Tab, Rfl: 0  •  methylPREDNISolone (MEDROL DOSEPAK) 4 MG Tablet Therapy Pack, Take as directed, Disp: 1 Kit, Rfl: 0  •  amoxicillin-clavulanate (AUGMENTIN) 875-125 MG Tab, Take 1 Tab by mouth 2 times a day for 5  days., Disp: 10 Tab, Rfl: 0  •  albuterol 108 (90 Base) MCG/ACT Aero Soln inhalation aerosol, Inhale 2 Puffs by mouth every 6 hours as needed for Shortness of Breath., Disp: 8.5 g, Rfl: 0  •  Crisaborole (EUCRISA) 2 % Ointment, 1 Application by Apply externally route 2 Times a Day., Disp: 1 Tube, Rfl: 3  •  predniSONE (DELTASONE) 20 MG Tab, Take 3 tabs at once PO daily x 5 days, then take 2 tabs at once daily x 3 days, then take 1 tab PO daily x 2 days, Disp: 23 Tab, Rfl: 0  •  promethazine (PHENERGAN) 25 MG Tab, Take 1 Tab by mouth every 6 hours as needed for Nausea/Vomiting., Disp: 20 Tab, Rfl: 0  •  ondansetron (ZOFRAN ODT) 4 MG TABLET DISPERSIBLE, Take 1 Tab by mouth every 8 hours as needed for Nausea., Disp: 10 Tab, Rfl: 0  •  cyclobenzaprine (FLEXERIL) 10 MG Tab, Take 1 Tab by mouth 3 times a day as needed for Muscle Spasms., Disp: 15 Tab, Rfl: 0  •  pimecrolimus (ELIDEL) 1 % cream, Apply 1 Application to affected area(s) 2 times a day. face (Patient not taking: Reported on 6/7/2019), Disp: 30 g, Rfl: 3  •  metoclopramide (REGLAN) 5 MG tablet, Take 1 Tab by mouth 4 times a day. (Patient not taking: Reported on 3/21/2019), Disp: 20 Tab, Rfl: 0  •  oxycodone-acetaminophen (PERCOCET) 5-325 MG Tab, Take 1-2 Tabs by mouth every 6 hours as needed (MODERATE PAIN). (Patient not taking: Reported on 4/29/2019), Disp: 15 Tab, Rfl: 0  ALLERGIES:   Allergies   Allergen Reactions   • Hydrocodone-Acetaminophen Nausea   • Vicodin [Hydrocodone-Acetaminophen] Vomiting     SURGHX:   Past Surgical History:   Procedure Laterality Date   • APPENDECTOMY       SOCHX:  reports that she has never smoked. She has never used smokeless tobacco. She reports that she drinks alcohol. She reports that she has current or past drug history. Drug: Marijuana.  FH: Family history was reviewed, no pertinent findings to report     Objective:   /70 (BP Location: Left arm, Patient Position: Sitting, BP Cuff Size: Adult)   Pulse 72   Temp 37.1  "°C (98.7 °F) (Temporal)   Resp 16   Ht 1.727 m (5' 8\")   Wt 69.3 kg (152 lb 12.8 oz)   LMP 05/16/2019 (Approximate)   SpO2 98%   BMI 23.23 kg/m²      Physical Exam   Constitutional: She is oriented to person, place, and time. She appears well-developed and well-nourished.   HENT:   Head: Normocephalic and atraumatic.   Right Ear: Tympanic membrane, external ear and ear canal normal.   Left Ear: Tympanic membrane, external ear and ear canal normal.   Nose: Mucosal edema and rhinorrhea present. Right sinus exhibits maxillary sinus tenderness. Right sinus exhibits no frontal sinus tenderness. Left sinus exhibits no maxillary sinus tenderness and no frontal sinus tenderness.   Mouth/Throat: Uvula is midline, oropharynx is clear and moist and mucous membranes are normal.   Eyes: Pupils are equal, round, and reactive to light. Conjunctivae are normal.   Neck: Normal range of motion.   Cardiovascular: Normal rate, regular rhythm and normal heart sounds.   No murmur heard.  Pulmonary/Chest: Effort normal. No accessory muscle usage. No respiratory distress. She has no decreased breath sounds. She has wheezes. She has no rhonchi. She has no rales.   Lymphadenopathy:     She has cervical adenopathy.        Right cervical: Superficial cervical adenopathy present.   Neurological: She is alert and oriented to person, place, and time.   Skin: Skin is warm and dry. Capillary refill takes less than 2 seconds.   Psychiatric: She has a normal mood and affect. Her behavior is normal. Judgment normal.   Vitals reviewed.       Assessment/Plan:     1. Acute maxillary sinusitis, recurrence not specified  - amoxicillin-clavulanate (AUGMENTIN) 875-125 MG Tab; Take 1 Tab by mouth 2 times a day for 5 days.  Dispense: 10 Tab; Refill: 0  - methylPREDNISolone (MEDROL DOSEPAK) 4 MG Tablet Therapy Pack; Take as directed  Dispense: 1 Kit; Refill: 0  - amoxicillin-clavulanate (AUGMENTIN) 875-125 MG Tab; Take 1 Tab by mouth 2 times a day for 5 " days.  Dispense: 10 Tab; Refill: 0    2. Allergic rhinitis, unspecified seasonality, unspecified trigger      3. Mild intermittent asthma with acute exacerbation  - methylPREDNISolone (MEDROL DOSEPAK) 4 MG Tablet Therapy Pack; Take as directed  Dispense: 1 Kit; Refill: 0  - albuterol 108 (90 Base) MCG/ACT Aero Soln inhalation aerosol; Inhale 2 Puffs by mouth every 6 hours as needed for Shortness of Breath.  Dispense: 8.5 g; Refill: 0      Differential diagnosis, natural history, supportive care, and indications for immediate follow-up discussed.  Return if symptoms worsen or fail to improve.

## 2019-08-30 ASSESSMENT — ENCOUNTER SYMPTOMS
ABDOMINAL PAIN: 0
CHILLS: 0
PALPITATIONS: 0
HEADACHES: 1
SHORTNESS OF BREATH: 1
BLURRED VISION: 0
DIZZINESS: 0
MYALGIAS: 0
SINUS PRESSURE: 1
EYE PAIN: 0
WHEEZING: 1
NAUSEA: 0
COUGH: 0
VOMITING: 0
DIARRHEA: 0
FEVER: 0
CONSTIPATION: 0
SINUS PAIN: 1

## 2019-09-10 ENCOUNTER — OFFICE VISIT (OUTPATIENT)
Dept: URGENT CARE | Facility: PHYSICIAN GROUP | Age: 24
End: 2019-09-10
Payer: COMMERCIAL

## 2019-09-10 ENCOUNTER — TELEPHONE (OUTPATIENT)
Dept: MEDICAL GROUP | Facility: LAB | Age: 24
End: 2019-09-10

## 2019-09-10 ENCOUNTER — TELEPHONE (OUTPATIENT)
Dept: DERMATOLOGY | Facility: IMAGING CENTER | Age: 24
End: 2019-09-10

## 2019-09-10 VITALS
OXYGEN SATURATION: 99 % | DIASTOLIC BLOOD PRESSURE: 72 MMHG | TEMPERATURE: 98.6 F | HEIGHT: 68 IN | SYSTOLIC BLOOD PRESSURE: 110 MMHG | RESPIRATION RATE: 16 BRPM | WEIGHT: 152 LBS | HEART RATE: 80 BPM | BODY MASS INDEX: 23.04 KG/M2

## 2019-09-10 DIAGNOSIS — L30.8 OTHER ECZEMA: ICD-10-CM

## 2019-09-10 PROCEDURE — 99214 OFFICE O/P EST MOD 30 MIN: CPT | Performed by: NURSE PRACTITIONER

## 2019-09-10 RX ORDER — PREDNISONE 20 MG/1
TABLET ORAL
Qty: 10 TAB | Refills: 0 | Status: SHIPPED | OUTPATIENT
Start: 2019-09-10 | End: 2020-09-16

## 2019-09-10 ASSESSMENT — ENCOUNTER SYMPTOMS
MYALGIAS: 0
TINGLING: 0
EYE DISCHARGE: 0
EYE REDNESS: 0
BLURRED VISION: 0
CHILLS: 0
FEVER: 0
SENSORY CHANGE: 0
DOUBLE VISION: 0

## 2019-09-10 NOTE — TELEPHONE ENCOUNTER
BERONICA Nichols. Cc: Lila Guzmán, Med Ass't   Phone Number: [phone]             Pt came in to asking for a referral for the allergist, pt stated that she left a message 48 hrs ago and there was no response, pt also went to the u/c. The allergist office said that they only receive the partial referral and they cannot      * a staff message was sent to the referral department and a voicemail was left with the patient.    Referral department sent a Mychart to follow up.    RE: Referral to Allergist   Received: Today   Message Contents   Marva Guzmán, Med Ass't             I re sent this and sent a message through my chart for pt to contact office.   Thank you,   Marva    Previous Messages      ----- Message -----   From: Lila Guzmán Med Ass't   Sent: 9/10/2019   9:11 AM PDT   To: Marva Walker   Subject: Referral to Allergist                             Shy Krishnan called and she stated that Dr Sahu's office will not let her make the appointment until the referral is faxed and an a allergist form is sent?     Can you please re-send referral information.     Thank you!

## 2019-09-10 NOTE — PROGRESS NOTES
Subjective:      Shy Buchanan is a 24 y.o. female who presents with Rash (X 10 days ) and Eye Swelling (puffiness, X yesterday)            HPI New. 24 year old female with rash for 10 days and eye swelling that began this morning. She has been fighting these issues for some time. Sees derm and has referral to allergy. She is more concerned about eye puffiness as she cannot use her eczema cream to this area. No new foods, animals or skin care products. This is her third type flare. Uses aveeno for moisturizer.  Hydrocodone-acetaminophen and Vicodin [hydrocodone-acetaminophen]  Current Outpatient Medications on File Prior to Visit   Medication Sig Dispense Refill   • albuterol 108 (90 Base) MCG/ACT Aero Soln inhalation aerosol Inhale 2 Puffs by mouth every 6 hours as needed for Shortness of Breath.     • methylPREDNISolone (MEDROL DOSEPAK) 4 MG Tablet Therapy Pack Take as directed 1 Kit 0   • albuterol 108 (90 Base) MCG/ACT Aero Soln inhalation aerosol Inhale 2 Puffs by mouth every 6 hours as needed for Shortness of Breath. 8.5 g 0   • Crisaborole (EUCRISA) 2 % Ointment 1 Application by Apply externally route 2 Times a Day. 1 Tube 3   • pimecrolimus (ELIDEL) 1 % cream Apply 1 Application to affected area(s) 2 times a day. face (Patient not taking: Reported on 6/7/2019) 30 g 3   • predniSONE (DELTASONE) 20 MG Tab Take 3 tabs at once PO daily x 5 days, then take 2 tabs at once daily x 3 days, then take 1 tab PO daily x 2 days 23 Tab 0   • promethazine (PHENERGAN) 25 MG Tab Take 1 Tab by mouth every 6 hours as needed for Nausea/Vomiting. 20 Tab 0   • metoclopramide (REGLAN) 5 MG tablet Take 1 Tab by mouth 4 times a day. (Patient not taking: Reported on 3/21/2019) 20 Tab 0   • ondansetron (ZOFRAN ODT) 4 MG TABLET DISPERSIBLE Take 1 Tab by mouth every 8 hours as needed for Nausea. 10 Tab 0   • oxycodone-acetaminophen (PERCOCET) 5-325 MG Tab Take 1-2 Tabs by mouth every 6 hours as needed (MODERATE PAIN). (Patient not  taking: Reported on 4/29/2019) 15 Tab 0   • cyclobenzaprine (FLEXERIL) 10 MG Tab Take 1 Tab by mouth 3 times a day as needed for Muscle Spasms. 15 Tab 0     No current facility-administered medications on file prior to visit.      Social History     Socioeconomic History   • Marital status: Single     Spouse name: Not on file   • Number of children: Not on file   • Years of education: Not on file   • Highest education level: Not on file   Occupational History   • Not on file   Social Needs   • Financial resource strain: Not on file   • Food insecurity:     Worry: Not on file     Inability: Not on file   • Transportation needs:     Medical: Not on file     Non-medical: Not on file   Tobacco Use   • Smoking status: Never Smoker   • Smokeless tobacco: Never Used   Substance and Sexual Activity   • Alcohol use: Yes     Comment: occ   • Drug use: Yes     Types: Marijuana   • Sexual activity: Yes     Partners: Male   Lifestyle   • Physical activity:     Days per week: Not on file     Minutes per session: Not on file   • Stress: Not on file   Relationships   • Social connections:     Talks on phone: Not on file     Gets together: Not on file     Attends [employer] service: Not on file     Active member of club or organization: Not on file     Attends meetings of clubs or organizations: Not on file     Relationship status: Not on file   • Intimate partner violence:     Fear of current or ex partner: Not on file     Emotionally abused: Not on file     Physically abused: Not on file     Forced sexual activity: Not on file   Other Topics Concern   • Not on file   Social History Narrative   • Not on file     Breast Cancer-related family history is not on file.      Review of Systems   Constitutional: Negative for chills and fever.   HENT: Positive for congestion.    Eyes: Negative for blurred vision, double vision, discharge and redness.   Musculoskeletal: Negative for myalgias.   Skin: Positive for itching and rash.  "  Neurological: Negative for tingling and sensory change.          Objective:     /72   Pulse 80   Temp 37 °C (98.6 °F)   Resp 16   Ht 1.727 m (5' 8\")   Wt 68.9 kg (152 lb)   LMP 05/16/2019 (Approximate)   SpO2 99%   BMI 23.11 kg/m²      Physical Exam   Constitutional: She appears well-developed and well-nourished. No distress.   Cardiovascular: Normal rate, regular rhythm and normal heart sounds.   No murmur heard.  Pulmonary/Chest: Effort normal. No respiratory distress.   Musculoskeletal: Normal range of motion.   Neurological: She is alert.   Skin: Skin is dry. Rash noted.   Periorbital skin with leathery, dry appearance. Left side with mild swelling. No discharge or weeping.   Psychiatric: She has a normal mood and affect. Her behavior is normal. Thought content normal.   Nursing note and vitals reviewed.              Assessment/Plan:     1. Other eczema  predniSONE (DELTASONE) 20 MG Tab    REFERRAL TO ALLERGY     vaseline as moisturizer.  New referral to allergy  Prednisone  Differential diagnosis, natural history, supportive care, and indications for immediate follow-up discussed at length.       "

## 2019-09-16 ENCOUNTER — OFFICE VISIT (OUTPATIENT)
Dept: DERMATOLOGY | Facility: IMAGING CENTER | Age: 24
End: 2019-09-16
Payer: COMMERCIAL

## 2019-09-16 DIAGNOSIS — L70.0 ACNE VULGARIS: ICD-10-CM

## 2019-09-16 DIAGNOSIS — L30.8 OTHER ECZEMA: ICD-10-CM

## 2019-09-16 PROCEDURE — 99213 OFFICE O/P EST LOW 20 MIN: CPT | Performed by: DERMATOLOGY

## 2019-09-16 RX ORDER — MOMETASONE FUROATE 1 MG/G
1 CREAM TOPICAL DAILY
Qty: 30 G | Refills: 1 | Status: SHIPPED | OUTPATIENT
Start: 2019-09-16 | End: 2020-09-16

## 2019-09-16 RX ORDER — SPIRONOLACTONE 50 MG/1
TABLET, FILM COATED ORAL
Qty: 180 TAB | Refills: 1 | Status: SHIPPED | OUTPATIENT
Start: 2019-09-16 | End: 2021-01-29

## 2019-09-17 NOTE — PROGRESS NOTES
Follow up acne   Worsening  No medication for acne , due to being preoccupied, ?expensive . Never started any treatment  Gyn states OK to use spironolactone    was using eucrisa for eczema, but burned significantly   Had bad flare on face x 1 week ago. Was put on medrol dose pack - urgent care   Last dose was yesterday  Seeing allergy for patch testing  No creams/oils/makeup/nail polish/hair products  Eczema hx:  Onset: >1.5 years ago  Symptoms: very itchy, red raised patches, dry  Aggravating factors: stress - not really with sun  Alleviating factors: steroid cream,  cbd & coconut oil  Other exposures: none - no moving, no new detergents, no new creams, notes sensitivity to metal necklaces; +family stress  Last bad flare u before above in July of 2018 - went to ED for medrol ose pack - helped  Taking zyrtec semi-regularly    Meds: I have reviewed medications relevant to my specialty.  PMHx: endometriosis, remainder reviewed in chart  PSHx: reviewed in chart  PFHx: grandmother - breast CA  Allergies: norco, vicodin    CC:f/u eczema, acne    No mouth sores, but has fatigue, muscle aches, headaches - states is getting this worked up by PCP    DermPMHx:   History of skin cancer: No  History of precancers/actinic keratoses: No  History of blistering/severe sunburns:No  Family history of skin cancer:No    ROS: no fevers/chills. No itch.  No joint pain; all other systems reviewed & negative    PE: Gen:WDWN female in NAD   HEENT: normocephalic, atraumatic  Resp: No effort with breathing  Psych: Normal mood & affect  Lymph:not examined  Skin: A focused cutaneous exam was completed including: hair, ears, face, eyelids, lips, neck, chest, abdomen and back  with the following pertinent findings listed below. Remaining above-listed examined areas within normal limits / negative for rashes or lesions.  Face with scattered erythematous papules, hyperpigmented macules  Scaling, minimal erythema on the left cheek    A/P:  1. Acne  vulgaris - moderate  - Start spironolactone 50mg qhs for 2 weeks; if tolerating well w/o side effects, increase dose to 100mg qhs; discussed side effects, possible risks, that it is not FDA approved for acne treatment. Patient needs to be on birth control if taking this medication. She is aware of the teratogenicity risk with this medication. Discussed the reported associated inc risk of gynecologic CA in the literature/black box warning of increased risk of tumor growth. Increased breast tenderness, irregular menstrual bleeding, dizziness, theoretical increase in K+, agranulocytosis, discussed  - patient aware that these medications not safe during pregnancy, and has been advised to stop medication if she is to become pregnant    2. Intrinsic eczema vs ?Other  - mometasone daily prn  - reviewed good skin care routine/cleansing/moisturizing/etc  - patch testing  - consider BX if recurs (rtc asap)    RTC: 3 months    Sherry Medina

## 2019-12-09 ENCOUNTER — APPOINTMENT (OUTPATIENT)
Dept: RADIOLOGY | Facility: MEDICAL CENTER | Age: 24
End: 2019-12-09
Attending: INTERNAL MEDICINE
Payer: COMMERCIAL

## 2020-02-16 ENCOUNTER — APPOINTMENT (OUTPATIENT)
Dept: URGENT CARE | Facility: CLINIC | Age: 25
End: 2020-02-16
Payer: COMMERCIAL

## 2020-09-09 ENCOUNTER — HOSPITAL ENCOUNTER (EMERGENCY)
Facility: MEDICAL CENTER | Age: 25
End: 2020-09-09
Attending: EMERGENCY MEDICINE
Payer: COMMERCIAL

## 2020-09-09 ENCOUNTER — APPOINTMENT (OUTPATIENT)
Dept: RADIOLOGY | Facility: MEDICAL CENTER | Age: 25
End: 2020-09-09
Attending: EMERGENCY MEDICINE
Payer: COMMERCIAL

## 2020-09-09 VITALS
TEMPERATURE: 98.8 F | HEIGHT: 67 IN | BODY MASS INDEX: 25.11 KG/M2 | OXYGEN SATURATION: 98 % | RESPIRATION RATE: 17 BRPM | SYSTOLIC BLOOD PRESSURE: 118 MMHG | HEART RATE: 57 BPM | WEIGHT: 160 LBS | DIASTOLIC BLOOD PRESSURE: 77 MMHG

## 2020-09-09 DIAGNOSIS — R56.9 SEIZURE (HCC): ICD-10-CM

## 2020-09-09 LAB
ALBUMIN SERPL BCP-MCNC: 4.5 G/DL (ref 3.2–4.9)
ALBUMIN/GLOB SERPL: 1.6 G/DL
ALP SERPL-CCNC: 66 U/L (ref 30–99)
ALT SERPL-CCNC: 14 U/L (ref 2–50)
ANION GAP SERPL CALC-SCNC: 14 MMOL/L (ref 7–16)
AST SERPL-CCNC: 15 U/L (ref 12–45)
BASOPHILS # BLD AUTO: 0.5 % (ref 0–1.8)
BASOPHILS # BLD: 0.06 K/UL (ref 0–0.12)
BILIRUB SERPL-MCNC: 0.5 MG/DL (ref 0.1–1.5)
BUN SERPL-MCNC: 5 MG/DL (ref 8–22)
CALCIUM SERPL-MCNC: 9.4 MG/DL (ref 8.5–10.5)
CHLORIDE SERPL-SCNC: 103 MMOL/L (ref 96–112)
CO2 SERPL-SCNC: 21 MMOL/L (ref 20–33)
CREAT SERPL-MCNC: 0.65 MG/DL (ref 0.5–1.4)
EKG IMPRESSION: NORMAL
EKG IMPRESSION: NORMAL
EOSINOPHIL # BLD AUTO: 0.46 K/UL (ref 0–0.51)
EOSINOPHIL NFR BLD: 3.7 % (ref 0–6.9)
ERYTHROCYTE [DISTWIDTH] IN BLOOD BY AUTOMATED COUNT: 40.6 FL (ref 35.9–50)
GLOBULIN SER CALC-MCNC: 2.8 G/DL (ref 1.9–3.5)
GLUCOSE BLD-MCNC: 74 MG/DL (ref 65–99)
GLUCOSE SERPL-MCNC: 85 MG/DL (ref 65–99)
HCG SERPL QL: NEGATIVE
HCT VFR BLD AUTO: 40.9 % (ref 37–47)
HGB BLD-MCNC: 13.8 G/DL (ref 12–16)
IMM GRANULOCYTES # BLD AUTO: 0.04 K/UL (ref 0–0.11)
IMM GRANULOCYTES NFR BLD AUTO: 0.3 % (ref 0–0.9)
LYMPHOCYTES # BLD AUTO: 1.68 K/UL (ref 1–4.8)
LYMPHOCYTES NFR BLD: 13.4 % (ref 22–41)
MCH RBC QN AUTO: 28.6 PG (ref 27–33)
MCHC RBC AUTO-ENTMCNC: 33.7 G/DL (ref 33.6–35)
MCV RBC AUTO: 84.7 FL (ref 81.4–97.8)
MONOCYTES # BLD AUTO: 0.71 K/UL (ref 0–0.85)
MONOCYTES NFR BLD AUTO: 5.6 % (ref 0–13.4)
NEUTROPHILS # BLD AUTO: 9.62 K/UL (ref 2–7.15)
NEUTROPHILS NFR BLD: 76.5 % (ref 44–72)
NRBC # BLD AUTO: 0 K/UL
NRBC BLD-RTO: 0 /100 WBC
PLATELET # BLD AUTO: 280 K/UL (ref 164–446)
PMV BLD AUTO: 9.2 FL (ref 9–12.9)
POTASSIUM SERPL-SCNC: 3.6 MMOL/L (ref 3.6–5.5)
PROT SERPL-MCNC: 7.3 G/DL (ref 6–8.2)
RBC # BLD AUTO: 4.83 M/UL (ref 4.2–5.4)
SODIUM SERPL-SCNC: 138 MMOL/L (ref 135–145)
TROPONIN T SERPL-MCNC: <6 NG/L (ref 6–19)
WBC # BLD AUTO: 12.6 K/UL (ref 4.8–10.8)

## 2020-09-09 PROCEDURE — 70450 CT HEAD/BRAIN W/O DYE: CPT

## 2020-09-09 PROCEDURE — 84484 ASSAY OF TROPONIN QUANT: CPT

## 2020-09-09 PROCEDURE — 84703 CHORIONIC GONADOTROPIN ASSAY: CPT

## 2020-09-09 PROCEDURE — 99284 EMERGENCY DEPT VISIT MOD MDM: CPT

## 2020-09-09 PROCEDURE — 93005 ELECTROCARDIOGRAM TRACING: CPT | Performed by: EMERGENCY MEDICINE

## 2020-09-09 PROCEDURE — 82962 GLUCOSE BLOOD TEST: CPT

## 2020-09-09 PROCEDURE — 85025 COMPLETE CBC W/AUTO DIFF WBC: CPT

## 2020-09-09 PROCEDURE — 80053 COMPREHEN METABOLIC PANEL: CPT

## 2020-09-09 NOTE — ED TRIAGE NOTES
Syncopal episode x 30 seconds while walking with coworker.  Coworker caught and lowered to ground.  Anxious, tachypnic on ems arrival.  Versed 1mg given with good relief.  C/o right neck soreness since episode, no other pain or injury.  Reports increased stress

## 2020-09-09 NOTE — ED PROVIDER NOTES
"ED Provider Note    Scribed for Pj Brenner M.D. by Elsa Kim. 9/9/2020, 2:43 PM.    Primary care provider: None  Means of arrival: Ambulance  History obtained from: patient   History limited by: none    CHIEF COMPLAINT  Chief Complaint   Patient presents with   • Syncope       HPI  Shy Buchanan is a 25 y.o. female who presents to the Emergency Department for syncope onset earlier today. Patients states she went to the dentist today and was treated with lidocaine for fillings and started feeling \"uneasy and anxious\" in the chair. She describes that she drove from the dentist to work and felt \"off and out of body\" but could not pin point what was wrong. When she arrived at work she walked inside and had an episode of syncope. The patient does not remember the incident. She notes that she remembers walking into woke and then waking up in the ambulance. The  at her work told her she was in and out of consciousness and hand some tremors in her hands and face. She has continuing confusion and endorses some associated loss of bladder control and numbness/tingling in toes and fingers. Her boyfriend was on the phone with her prior to her syncopal episode and states she was not slurring her speech and was making sense. Denies chest pain, shortness of breath, or biting tongue. She has a past medical history of lupus, but denies any history of heart problems. Additionally she has a past family history of seizures in her paternal aunt and cousin have seizures.    REVIEW OF SYSTEMS  Pertinent positives include syncope, confusion, loss of bladder control, and numbness/tingling in toes. Pertinent negatives include chest pain, shortness of breath, or biting tongue. All other systems negative.    PAST MEDICAL HISTORY   has a past medical history of Healthy adult, Hypertension, and Lupus (HCC).    SURGICAL HISTORY   has a past surgical history that includes appendectomy.    SOCIAL HISTORY  Social History " "    Tobacco Use   • Smoking status: Never Smoker   • Smokeless tobacco: Never Used   Substance Use Topics   • Alcohol use: Yes     Comment: occ   • Drug use: Yes     Types: Marijuana      Social History     Substance and Sexual Activity   Drug Use Yes   • Types: Marijuana       FAMILY HISTORY  Family History   Problem Relation Age of Onset   • Lung Disease Mother    • Hypertension Mother    • Lung Disease Father    • Lung Disease Sister    • Hypertension Sister        CURRENT MEDICATIONS  Home Medications    **Home medications have not yet been reviewed for this encounter**         ALLERGIES  Allergies   Allergen Reactions   • Hydrocodone-Acetaminophen Nausea   • Vicodin [Hydrocodone-Acetaminophen] Vomiting       PHYSICAL EXAM  VITAL SIGNS: /82   Pulse 70   Temp 37.1 °C (98.8 °F)   Resp 12   Ht 1.702 m (5' 7\")   Wt 72.6 kg (160 lb)   LMP 09/02/2020   SpO2 97%   Breastfeeding Unknown   BMI 25.06 kg/m²     Constitutional: Well developed, Well nourished, mild distress, wet pants.  HENT: Normocephalic, Atraumatic, Oropharynx moist, no bite marks on tongue.  Eyes: Conjunctiva normal, No discharge, pupils 3 mm.   Neck: Supple, No stridor.  Cardiovascular: Normal heart rate, Normal rhythm, No murmurs, equal pulses.   Pulmonary: Normal breath sounds, No respiratory distress, No wheezing, No rales, No rhonchi.  Chest: No chest wall tenderness or deformity.   Abdomen:Soft, No tenderness, No masses, no rebound, no guarding.   Back: No CVA tenderness.   Musculoskeletal: No major deformities noted, No tenderness.   Skin: Warm, Dry, No erythema, No rash.   Neurologic: Alert & oriented x 3, Normal motor function,  No focal deficits noted. Normal finger to nose, Normal cranial nerves II-XII, No pronator drift, normal heel to shin test. Equal strength in upper and lower extremities bilaterally.  Psychiatric: Affect normal, Judgment normal, Mood normal    LABS  Results for orders placed or performed during the hospital " encounter of 09/09/20   HCG QUAL SERUM   Result Value Ref Range    Beta-Hcg Qualitative Serum Negative Negative   CBC WITH DIFFERENTIAL   Result Value Ref Range    WBC 12.6 (H) 4.8 - 10.8 K/uL    RBC 4.83 4.20 - 5.40 M/uL    Hemoglobin 13.8 12.0 - 16.0 g/dL    Hematocrit 40.9 37.0 - 47.0 %    MCV 84.7 81.4 - 97.8 fL    MCH 28.6 27.0 - 33.0 pg    MCHC 33.7 33.6 - 35.0 g/dL    RDW 40.6 35.9 - 50.0 fL    Platelet Count 280 164 - 446 K/uL    MPV 9.2 9.0 - 12.9 fL    Neutrophils-Polys 76.50 (H) 44.00 - 72.00 %    Lymphocytes 13.40 (L) 22.00 - 41.00 %    Monocytes 5.60 0.00 - 13.40 %    Eosinophils 3.70 0.00 - 6.90 %    Basophils 0.50 0.00 - 1.80 %    Immature Granulocytes 0.30 0.00 - 0.90 %    Nucleated RBC 0.00 /100 WBC    Neutrophils (Absolute) 9.62 (H) 2.00 - 7.15 K/uL    Lymphs (Absolute) 1.68 1.00 - 4.80 K/uL    Monos (Absolute) 0.71 0.00 - 0.85 K/uL    Eos (Absolute) 0.46 0.00 - 0.51 K/uL    Baso (Absolute) 0.06 0.00 - 0.12 K/uL    Immature Granulocytes (abs) 0.04 0.00 - 0.11 K/uL    NRBC (Absolute) 0.00 K/uL   COMP METABOLIC PANEL   Result Value Ref Range    Sodium 138 135 - 145 mmol/L    Potassium 3.6 3.6 - 5.5 mmol/L    Chloride 103 96 - 112 mmol/L    Co2 21 20 - 33 mmol/L    Anion Gap 14.0 7.0 - 16.0    Glucose 85 65 - 99 mg/dL    Bun 5 (L) 8 - 22 mg/dL    Creatinine 0.65 0.50 - 1.40 mg/dL    Calcium 9.4 8.5 - 10.5 mg/dL    AST(SGOT) 15 12 - 45 U/L    ALT(SGPT) 14 2 - 50 U/L    Alkaline Phosphatase 66 30 - 99 U/L    Total Bilirubin 0.5 0.1 - 1.5 mg/dL    Albumin 4.5 3.2 - 4.9 g/dL    Total Protein 7.3 6.0 - 8.2 g/dL    Globulin 2.8 1.9 - 3.5 g/dL    A-G Ratio 1.6 g/dL   TROPONIN   Result Value Ref Range    Troponin T <6 6 - 19 ng/L   ESTIMATED GFR   Result Value Ref Range    GFR If African American >60 >60 mL/min/1.73 m 2    GFR If Non African American >60 >60 mL/min/1.73 m 2   ACCU-CHEK GLUCOSE   Result Value Ref Range    Glucose - Accu-Ck 74 65 - 99 mg/dL   EKG   Result Value Ref Range    Report       [facility] Emergency Dept.    Test Date:  2020  Pt Name:    DANTE DANIELLE                   Department: ER  MRN:        [id number]                      Room:        57  Gender:     Female                       Technician: [id number]  :        1995                   Requested By:ER TRIAGE PROTOCOL  Order #:    [id number]                    Reading MD: EBER MELENDEZ MD    Measurements  Intervals                                Axis  Rate:       65                           P:          39  AR:         184                          QRS:        48  QRSD:       82                           T:          28  QT:         380  QTc:        396    Interpretive Statements  SINUS RHYTHM, rate of 65, normal axis  BASELINE WANDER IN LEAD(S) V5  No previous ECG available for comparison  Electronically Signed On 2020 15:26:29 PDT by EBER MELENDEZ MD     EKG (NOW)   Result Value Ref Range    Report       [facility] Emergency Dept.    Test Date:  2020  Pt Name:    DANTE DANIELLE                   Department: ER  MRN:        [id number]                      Room:       [facility]  Gender:     Female                       Technician: [id number]  :        1995                   Requested By:EBER MELENDEZ  Order #:    [id number]                    Reading MD: EBER MELENDEZ MD    Measurements  Intervals                                Axis  Rate:       63                           P:  AR:                                      QRS:        47  QRSD:       84                           T:          17  QT:         380  QTc:        389    Interpretive Statements  SINUS RHYTHM, rate of 63, normal axis, artifact in 1, II, AVf  No previous ECG available for comparison  Electronically Signed On 2020 19:30:16 PDT by EBER MELENDEZ MD       All labs reviewed by me.    EKG  12 Lead EKG interpreted by me as above.     RADIOLOGY  CT-HEAD W/O   Final Result      Normal noncontrast  head CT.        The radiologist's interpretation of all radiological studies have been reviewed by me.    COURSE & MEDICAL DECISION MAKING  Pertinent Labs & Imaging studies reviewed. (See chart for details)    2:43 PM - Patient seen and examined at bedside. I discussed that because she is not fully aware of the incident it is likely she had a seizure. We will obtain labs and a CT to evaluate for anything that may have cause a seizure or any other cause of her syncope. I informed the patient that if this was a seizure we do not start medications after the first one and she should follow up with a primary care physician. Ordered CT head, Accu-chek glucose, HCG qual, CBC w/ diff, CMP, troponin, and EKG to evaluate her symptoms. The differential diagnoses include but are not limited to: Syncope, arrhythmia, seizure vs electrolyte abnormality vs orthostatic syncope     5:00 PM - Patient was reevaluated at bedside. Discussed lab and radiology results with the patient and informed them that there were no concerning abnormalities. I believe the patient has a seizure and re discussed the above information. Discussed return precautions and advised her to follow up with her PCP and neurology. Patient will be discharged at this time. She verbalizes agreement with discharge and plan of care.    Medical Decision Making: At this point time I think the patient may have had actually a seizure with the tremulousness and possible postictal state with confusion after this episode.  Patient's EKG does not show any arrhythmia, no signs of Brugada syndrome or WPW.  I do not think the patient had anesthetic overdose she did not have any perioral numbness.  Her episode resolved spontaneously.  Patient's electrolytes are otherwise unremarkable.  Because of the slight continued confusion CT of the head was done to rule out intracranial mass or hemorrhage which is negative.    The patient will return for new or worsening symptoms and is stable  at the time of discharge.    DISPOSITION:  Patient will be discharged home in stable condition.    FOLLOW UP:  [facility]  [address], [address]  [facility] [address] [phone]  Schedule an appointment as soon as possible for a visit       Your doctor          [facility]  [address] [id number] [phone]    If you need a doctor    [address] [address] [phone]    If you need a doctor      FINAL IMPRESSION  1. Seizure (HCC)          Elsa VALENTIN (Scribe), am scribing for, and in the presence of, Pj Brenner M.D.    Electronically signed by: Elsa Kim (Heidyibgarrick), 9/9/2020    IPj M.D. personally performed the services described in this documentation, as scribed by Elsa Kim in my presence, and it is both accurate and complete. C    The note accurately reflects work and decisions made by me.  Pj Brenner M.D.  9/9/2020  7:28 PM

## 2020-09-10 NOTE — DISCHARGE INSTRUCTIONS
No driving until released by Neurology. Return if you have another seizure, multiple seizures in a row, seizure lasting longer than five minutes, chest pain or pass out again.

## 2020-09-12 SDOH — ECONOMIC STABILITY: HOUSING INSECURITY
IN THE LAST 12 MONTHS, WAS THERE A TIME WHEN YOU DID NOT HAVE A STEADY PLACE TO SLEEP OR SLEPT IN A SHELTER (INCLUDING NOW)?: PATIENT REFUSED

## 2020-09-12 SDOH — HEALTH STABILITY: MENTAL HEALTH
STRESS IS WHEN SOMEONE FEELS TENSE, NERVOUS, ANXIOUS, OR CAN'T SLEEP AT NIGHT BECAUSE THEIR MIND IS TROUBLED. HOW STRESSED ARE YOU?: VERY MUCH

## 2020-09-12 SDOH — ECONOMIC STABILITY: INCOME INSECURITY: IN THE LAST 12 MONTHS, WAS THERE A TIME WHEN YOU WERE NOT ABLE TO PAY THE MORTGAGE OR RENT ON TIME?: PATIENT REFUSED

## 2020-09-12 SDOH — HEALTH STABILITY: PHYSICAL HEALTH: ON AVERAGE, HOW MANY MINUTES DO YOU ENGAGE IN EXERCISE AT THIS LEVEL?: 70 MINUTES

## 2020-09-12 SDOH — ECONOMIC STABILITY: TRANSPORTATION INSECURITY
IN THE PAST 12 MONTHS, HAS LACK OF RELIABLE TRANSPORTATION KEPT YOU FROM MEDICAL APPOINTMENTS, MEETINGS, WORK OR FROM GETTING THINGS NEEDED FOR DAILY LIVING?: DECLINE

## 2020-09-12 SDOH — ECONOMIC STABILITY: HOUSING INSECURITY

## 2020-09-12 SDOH — HEALTH STABILITY: PHYSICAL HEALTH: ON AVERAGE, HOW MANY DAYS PER WEEK DO YOU ENGAGE IN MODERATE TO STRENUOUS EXERCISE (LIKE A BRISK WALK)?: 4 DAYS

## 2020-09-12 SDOH — ECONOMIC STABILITY: TRANSPORTATION INSECURITY
IN THE PAST 12 MONTHS, HAS THE LACK OF TRANSPORTATION KEPT YOU FROM MEDICAL APPOINTMENTS OR FROM GETTING MEDICATIONS?: DECLINE

## 2020-09-12 ASSESSMENT — SOCIAL DETERMINANTS OF HEALTH (SDOH)
WITHIN THE PAST 12 MONTHS, YOU WORRIED THAT YOUR FOOD WOULD RUN OUT BEFORE YOU GOT THE MONEY TO BUY MORE: DECLINE
ARE YOU MARRIED, WIDOWED, DIVORCED, SEPARATED, NEVER MARRIED, OR LIVING WITH A PARTNER?: DECLINE
HOW MANY DRINKS CONTAINING ALCOHOL DO YOU HAVE ON A TYPICAL DAY WHEN YOU ARE DRINKING: DECLINE
HOW OFTEN DO YOU ATTENT MEETINGS OF THE CLUB OR ORGANIZATION YOU BELONG TO?: DECLINE
HOW OFTEN DO YOU HAVE SIX OR MORE DRINKS ON ONE OCCASION: DECLINE
IN A TYPICAL WEEK, HOW MANY TIMES DO YOU TALK ON THE PHONE WITH FAMILY, FRIENDS, OR NEIGHBORS?: DECLINE
WITHIN THE PAST 12 MONTHS, THE FOOD YOU BOUGHT JUST DIDN'T LAST AND YOU DIDN'T HAVE MONEY TO GET MORE: DECLINE
HOW OFTEN DO YOU ATTEND CHURCH OR RELIGIOUS SERVICES?: DECLINE
DO YOU BELONG TO ANY CLUBS OR ORGANIZATIONS SUCH AS CHURCH GROUPS UNIONS, FRATERNAL OR ATHLETIC GROUPS, OR SCHOOL GROUPS?: DECLINE
HOW OFTEN DO YOU GET TOGETHER WITH FRIENDS OR RELATIVES?: DECLINE
HOW OFTEN DO YOU HAVE A DRINK CONTAINING ALCOHOL: DECLINE
HOW HARD IS IT FOR YOU TO PAY FOR THE VERY BASICS LIKE FOOD, HOUSING, MEDICAL CARE, AND HEATING?: DECLINE

## 2020-09-16 ENCOUNTER — HOSPITAL ENCOUNTER (OUTPATIENT)
Dept: LAB | Facility: MEDICAL CENTER | Age: 25
End: 2020-09-16
Attending: PHYSICIAN ASSISTANT
Payer: COMMERCIAL

## 2020-09-16 ENCOUNTER — OFFICE VISIT (OUTPATIENT)
Dept: MEDICAL GROUP | Facility: LAB | Age: 25
End: 2020-09-16
Payer: COMMERCIAL

## 2020-09-16 ENCOUNTER — TELEPHONE (OUTPATIENT)
Dept: MEDICAL GROUP | Facility: LAB | Age: 25
End: 2020-09-16

## 2020-09-16 VITALS
SYSTOLIC BLOOD PRESSURE: 122 MMHG | DIASTOLIC BLOOD PRESSURE: 68 MMHG | WEIGHT: 166.4 LBS | BODY MASS INDEX: 25.22 KG/M2 | TEMPERATURE: 98.4 F | HEART RATE: 56 BPM | OXYGEN SATURATION: 96 % | HEIGHT: 68 IN

## 2020-09-16 DIAGNOSIS — R56.9 SEIZURE (HCC): ICD-10-CM

## 2020-09-16 DIAGNOSIS — R53.83 FATIGUE, UNSPECIFIED TYPE: ICD-10-CM

## 2020-09-16 DIAGNOSIS — M54.2 CERVICALGIA: ICD-10-CM

## 2020-09-16 DIAGNOSIS — R56.9 SEIZURE (HCC): Primary | ICD-10-CM

## 2020-09-16 DIAGNOSIS — D72.829 LEUKOCYTOSIS, UNSPECIFIED TYPE: ICD-10-CM

## 2020-09-16 DIAGNOSIS — M32.9 LUPUS (HCC): ICD-10-CM

## 2020-09-16 LAB
25(OH)D3 SERPL-MCNC: 35 NG/ML (ref 30–100)
ALBUMIN SERPL BCP-MCNC: 4.3 G/DL (ref 3.2–4.9)
ALBUMIN/GLOB SERPL: 1.5 G/DL
ALP SERPL-CCNC: 68 U/L (ref 30–99)
ALT SERPL-CCNC: 12 U/L (ref 2–50)
ANION GAP SERPL CALC-SCNC: 12 MMOL/L (ref 7–16)
AST SERPL-CCNC: 14 U/L (ref 12–45)
BASOPHILS # BLD AUTO: 1 % (ref 0–1.8)
BASOPHILS # BLD: 0.07 K/UL (ref 0–0.12)
BILIRUB SERPL-MCNC: 0.3 MG/DL (ref 0.1–1.5)
BUN SERPL-MCNC: 11 MG/DL (ref 8–22)
CALCIUM SERPL-MCNC: 9.3 MG/DL (ref 8.5–10.5)
CHLORIDE SERPL-SCNC: 104 MMOL/L (ref 96–112)
CO2 SERPL-SCNC: 23 MMOL/L (ref 20–33)
CREAT SERPL-MCNC: 0.73 MG/DL (ref 0.5–1.4)
CRP SERPL HS-MCNC: <0.03 MG/DL (ref 0–0.75)
EOSINOPHIL # BLD AUTO: 0.45 K/UL (ref 0–0.51)
EOSINOPHIL NFR BLD: 6.3 % (ref 0–6.9)
ERYTHROCYTE [DISTWIDTH] IN BLOOD BY AUTOMATED COUNT: 40.7 FL (ref 35.9–50)
ERYTHROCYTE [SEDIMENTATION RATE] IN BLOOD BY WESTERGREN METHOD: 3 MM/HOUR (ref 0–20)
GLOBULIN SER CALC-MCNC: 2.8 G/DL (ref 1.9–3.5)
GLUCOSE SERPL-MCNC: 93 MG/DL (ref 65–99)
HCT VFR BLD AUTO: 45.2 % (ref 37–47)
HGB BLD-MCNC: 14.7 G/DL (ref 12–16)
IMM GRANULOCYTES # BLD AUTO: 0.02 K/UL (ref 0–0.11)
IMM GRANULOCYTES NFR BLD AUTO: 0.3 % (ref 0–0.9)
LYMPHOCYTES # BLD AUTO: 2.09 K/UL (ref 1–4.8)
LYMPHOCYTES NFR BLD: 29.1 % (ref 22–41)
MCH RBC QN AUTO: 28.4 PG (ref 27–33)
MCHC RBC AUTO-ENTMCNC: 32.5 G/DL (ref 33.6–35)
MCV RBC AUTO: 87.3 FL (ref 81.4–97.8)
MONOCYTES # BLD AUTO: 0.57 K/UL (ref 0–0.85)
MONOCYTES NFR BLD AUTO: 7.9 % (ref 0–13.4)
NEUTROPHILS # BLD AUTO: 3.99 K/UL (ref 2–7.15)
NEUTROPHILS NFR BLD: 55.4 % (ref 44–72)
NRBC # BLD AUTO: 0 K/UL
NRBC BLD-RTO: 0 /100 WBC
PLATELET # BLD AUTO: 279 K/UL (ref 164–446)
PMV BLD AUTO: 10.3 FL (ref 9–12.9)
POTASSIUM SERPL-SCNC: 4.3 MMOL/L (ref 3.6–5.5)
PROT SERPL-MCNC: 7.1 G/DL (ref 6–8.2)
RBC # BLD AUTO: 5.18 M/UL (ref 4.2–5.4)
SODIUM SERPL-SCNC: 139 MMOL/L (ref 135–145)
TSH SERPL DL<=0.005 MIU/L-ACNC: 1.13 UIU/ML (ref 0.38–5.33)
WBC # BLD AUTO: 7.2 K/UL (ref 4.8–10.8)

## 2020-09-16 PROCEDURE — 86235 NUCLEAR ANTIGEN ANTIBODY: CPT

## 2020-09-16 PROCEDURE — 86225 DNA ANTIBODY NATIVE: CPT

## 2020-09-16 PROCEDURE — 86038 ANTINUCLEAR ANTIBODIES: CPT

## 2020-09-16 PROCEDURE — 99214 OFFICE O/P EST MOD 30 MIN: CPT | Performed by: PHYSICIAN ASSISTANT

## 2020-09-16 PROCEDURE — 86039 ANTINUCLEAR ANTIBODIES (ANA): CPT

## 2020-09-16 PROCEDURE — 36415 COLL VENOUS BLD VENIPUNCTURE: CPT

## 2020-09-16 PROCEDURE — 83036 HEMOGLOBIN GLYCOSYLATED A1C: CPT

## 2020-09-16 PROCEDURE — 82306 VITAMIN D 25 HYDROXY: CPT

## 2020-09-16 PROCEDURE — 85025 COMPLETE CBC W/AUTO DIFF WBC: CPT

## 2020-09-16 PROCEDURE — 84443 ASSAY THYROID STIM HORMONE: CPT

## 2020-09-16 PROCEDURE — 86140 C-REACTIVE PROTEIN: CPT

## 2020-09-16 PROCEDURE — 80053 COMPREHEN METABOLIC PANEL: CPT

## 2020-09-16 PROCEDURE — 85652 RBC SED RATE AUTOMATED: CPT

## 2020-09-16 ASSESSMENT — FIBROSIS 4 INDEX: FIB4 SCORE: 0.37

## 2020-09-16 ASSESSMENT — PATIENT HEALTH QUESTIONNAIRE - PHQ9: CLINICAL INTERPRETATION OF PHQ2 SCORE: 0

## 2020-09-16 NOTE — ASSESSMENT & PLAN NOTE
New to me:  Pt reports that she has been worked up for this in the past.   Reports a lot of inflammation, chronic joint pain in neck and low back.  History of rash on face that appears to be consistent with malar rash.   Needs to repeat testing.

## 2020-09-16 NOTE — PROGRESS NOTES
"Subjective:   CC: Shy Buchanan is a 25 y.o. female here today to establish care with new provider, further eval of suspected seizure activity and Lupus work up.     HPI:  Lupus (HCC)  New to me:  Pt reports that she has been worked up for this in the past.   Reports a lot of inflammation, chronic joint pain in neck and low back.  History of rash on face that appears to be consistent with malar rash.   Needs to repeat testing.     Seizure (HCC)  New to me; ED follow up.   Pt has a suspected seizure on 09/09/2020. Was seen at the ED that day. Per pt, she was walking into work and did not recall falling down approx 4 steps, as well as having a convulsion. This was witnessed by someone else at work. Pt reported a suspected post-ictal state of confusion, and continues to feel groggy and tired today.     Denies headache today, no vision changes  Does continue to have neck pain.   Reports chipping a tooth related to the fall/seizure activity?  No recent seizure activity since 09/09/2020    Prior to the suspected seizure activity, pt reports that she was given Lidocaine at dentist for filling; has had issues with Lidocaine making her feel \"off\" in the past. This has since been added to allergy list.     Pt has an extensive family history of neurological disorders and Lupus in her family. Reports that mother has PSP (?), her brother passed away from brain cancer.     Fatigue  New to me  Ongoing for several years.   Has been in process of work up for Lupus. Reports that Celiac Disease has been r/o by GI.   Does have a lot of allergies - for which she uses OTC allergy medication.        Current medicines (including changes today)  Current Outpatient Medications   Medication Sig Dispense Refill   • spironolactone (ALDACTONE) 50 MG Tab Take 1 tablet by mouth at night for 2 weeks, then increase to 2 tablets at night 180 Tab 1   • albuterol 108 (90 Base) MCG/ACT Aero Soln inhalation aerosol Inhale 2 Puffs by mouth every 6 " "hours as needed for Shortness of Breath.     • ondansetron (ZOFRAN ODT) 4 MG TABLET DISPERSIBLE Take 1 Tab by mouth every 8 hours as needed for Nausea. 10 Tab 0     No current facility-administered medications for this visit.      She  has a past medical history of Healthy adult, Hypertension, and Lupus (HCC). She also has no past medical history of Asthma or Hyperlipidemia.    ROS   +fatigue  +seziure w/ LOC  No chest pain, no shortness of breath, no abdominal pain       Objective:     /68 (BP Location: Left arm, Patient Position: Sitting)   Pulse (!) 56   Temp 36.9 °C (98.4 °F) (Temporal)   Ht 1.727 m (5' 8\")   Wt 75.5 kg (166 lb 6.4 oz)   SpO2 96%  Body mass index is 25.3 kg/m².   Physical Exam:  Constitutional: Alert, no distress.  Skin: Warm, dry, good turgor, no rashes in visible areas.  Eye: Equal, round and reactive, conjunctiva clear, lids normal.  ENMT: Lips without lesions, good dentition, oropharynx clear. TMs pearly gray bilaterally. NOSE: Nasal turbinates are edematous and erythematous b/l.  Neck: Trachea midline, no masses, no thyromegaly. No cervical or supraclavicular lymphadenopathy. NECK: Mild TTP along cervical spinous processes. Moderate TTP along b/l cervical paraspinal muscles as well as superior aspect of Trapezius muscles, b/l. R >L Decreased ROM with rotation to the R and L. FROM with flexion and extension of the neck. Strength is 5/5 in UE, b/l. Sensation is intact and symmetrical, b/l.  Respiratory: Unlabored respiratory effort, lungs clear to auscultation, no wheezes, no ronchi.  Cardiovascular: Normal S1, S2, no murmur, no edema.  NEURO: CN 2-12 are intact. Romberg negative, though was swaying. Normal gait. Strength and sensation intact.    Psych: Alert and oriented x3, normal affect and mood.    Assessment and Plan:   The following treatment plan was discussed    1. Seizure (HCC)  New to me. ED records were reviewed today to include CT brain - normal.   Stable, no recent " seizure activity since 09/09/2020.   Will send Urgent referral to neuro for further testing to include EEG.  ER precautions discussed.   Pt to refrain from driving at this time; is able to work from home as tolerated w/ screen time/stress levels, etc.    - Comp Metabolic Panel; Future  - HEMOGLOBIN A1C; Future  - CRP QUANTITIVE (NON-CARDIAC); Future  - Sed Rate; Future  - REFERRAL TO NEUROLOGY    2. Lupus (HCC)  New to me  Will continue to work this up  Consider Rheumatology referral in future   - SCOTT ANTIBODY WITH REFLEX; Future    3. Fatigue, unspecified type  New to me  Check labs as ordered today.   - CBC WITH DIFFERENTIAL; Future  - TSH WITH REFLEX TO FT4; Future  - VITAMIN D,25 HYDROXY; Future    4. Leukocytosis, unspecified type  New to me - does have chronically elevated WBC.   Will continue to monitor levels - consider seeing Heme.   - CBC WITH DIFFERENTIAL; Future    5. Cervicalgia  New to me  Xray ordered today  Likely muscle spasm.   Pt declined IBU/Tylenol  Consider PT  If no improvement.   - DX-CERVICAL SPINE-2 OR 3 VIEWS; Future    Pt is agreeable with the above plan.     Followup: Return if symptoms worsen or fail to improve.       AYO Richardson.-LIZ.  Supervising MD: Dr. Yaritza Whitney MD  09/16/20       Alert and oriented to person, place and time

## 2020-09-16 NOTE — Clinical Note
Hello - can we call this patient and let her know that I ordered a neck xray to make sure everything looks okay - they didn't do this in the ED    Thank you!  Nohemy Cooper P.A.-C.

## 2020-09-16 NOTE — ASSESSMENT & PLAN NOTE
New to me  Ongoing for several years.   Has been in process of work up for Lupus. Reports that Celiac Disease has been r/o by GI.   Does have a lot of allergies - for which she uses OTC allergy medication.

## 2020-09-16 NOTE — TELEPHONE ENCOUNTER
----- Message from Nohemy Cooper P.A.-C. sent at 9/16/2020  8:28 AM PDT -----  Hello - can we call this patient and let her know that I ordered a neck xray to make sure everything looks okay - they didn't do this in the EDThank you!Nohemy Cooper P.A.-C.

## 2020-09-16 NOTE — ASSESSMENT & PLAN NOTE
"New to me; ED follow up.   Pt has a suspected seizure on 09/09/2020. Was seen at the ED that day. Per pt, she was walking into work and did not recall falling down approx 4 steps, as well as having a convulsion. This was witnessed by someone else at work. Pt reported a suspected post-ictal state of confusion, and continues to feel groggy and tired today.     Denies headache today, no vision changes  Does continue to have neck pain.   Reports chipping a tooth related to the fall/seizure activity?  No recent seizure activity since 09/09/2020    Prior to the suspected seizure activity, pt reports that she was given Lidocaine at dentist for filling; has had issues with Lidocaine making her feel \"off\" in the past. This has since been added to allergy list.     Pt has an extensive family history of neurological disorders and Lupus in her family. Reports that mother has PSP (?), her brother passed away from brain cancer.   "

## 2020-09-17 LAB
EST. AVERAGE GLUCOSE BLD GHB EST-MCNC: 114 MG/DL
HBA1C MFR BLD: 5.6 % (ref 0–5.6)

## 2020-09-18 LAB — NUCLEAR IGG SER QL IA: DETECTED

## 2020-09-19 ENCOUNTER — HOSPITAL ENCOUNTER (OUTPATIENT)
Dept: RADIOLOGY | Facility: MEDICAL CENTER | Age: 25
End: 2020-09-19
Attending: PHYSICIAN ASSISTANT
Payer: COMMERCIAL

## 2020-09-19 DIAGNOSIS — M54.2 CERVICALGIA: ICD-10-CM

## 2020-09-19 LAB
ANA INTERPRETIVE COMMENT Q5143: ABNORMAL
ANA PATTERN Q5144: ABNORMAL
ANA TITER Q5145: ABNORMAL
ANTINUCLEAR ANTIBODY (ANA), HEP-2, IGG Q5142: DETECTED

## 2020-09-19 PROCEDURE — 72040 X-RAY EXAM NECK SPINE 2-3 VW: CPT

## 2020-09-20 LAB
DSDNA AB TITR SER CLIF: NORMAL {TITER}
ENA JO1 AB TITR SER: 0 AU/ML (ref 0–40)
ENA SCL70 IGG SER QL: 2 AU/ML (ref 0–40)
ENA SM IGG SER-ACNC: 0 AU/ML (ref 0–40)
ENA SS-B IGG SER IA-ACNC: 3 AU/ML (ref 0–40)
SSA52 R0ENA AB IGG Q0420: 1 AU/ML (ref 0–40)
SSA60 R0ENA AB IGG Q0419: 0 AU/ML (ref 0–40)
U1 SNRNP IGG SER QL: 2 AU/ML (ref 0–40)

## 2020-09-21 PROBLEM — R76.8 POSITIVE ANA (ANTINUCLEAR ANTIBODY): Status: ACTIVE | Noted: 2020-09-16
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
## 2020-09-28 ENCOUNTER — OFFICE VISIT (OUTPATIENT)
Dept: NEUROLOGY | Facility: MEDICAL CENTER | Age: 25
End: 2020-09-28
Payer: COMMERCIAL

## 2020-09-28 VITALS
BODY MASS INDEX: 25.79 KG/M2 | OXYGEN SATURATION: 96 % | TEMPERATURE: 97.3 F | RESPIRATION RATE: 14 BRPM | WEIGHT: 170.19 LBS | DIASTOLIC BLOOD PRESSURE: 76 MMHG | SYSTOLIC BLOOD PRESSURE: 122 MMHG | HEART RATE: 74 BPM | HEIGHT: 68 IN

## 2020-09-28 DIAGNOSIS — F39 MOOD DISORDER (HCC): ICD-10-CM

## 2020-09-28 DIAGNOSIS — Z09 HOSPITAL DISCHARGE FOLLOW-UP: ICD-10-CM

## 2020-09-28 DIAGNOSIS — R56.9 WITNESSED SEIZURE-LIKE ACTIVITY (HCC): ICD-10-CM

## 2020-09-28 DIAGNOSIS — Z02.4 ENCOUNTER FOR EXAMINATION FOR DRIVING LICENSE: ICD-10-CM

## 2020-09-28 DIAGNOSIS — F32.A DEPRESSION, UNSPECIFIED DEPRESSION TYPE: ICD-10-CM

## 2020-09-28 DIAGNOSIS — Z13.31 SCREENING FOR DEPRESSION: ICD-10-CM

## 2020-09-28 PROCEDURE — 99205 OFFICE O/P NEW HI 60 MIN: CPT | Performed by: NURSE PRACTITIONER

## 2020-09-28 ASSESSMENT — FIBROSIS 4 INDEX: FIB4 SCORE: 0.38
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
## 2020-09-30 ENCOUNTER — TELEMEDICINE (OUTPATIENT)
Dept: MEDICAL GROUP | Facility: LAB | Age: 25
End: 2020-09-30
Payer: COMMERCIAL

## 2020-09-30 VITALS — WEIGHT: 170 LBS | BODY MASS INDEX: 26.23 KG/M2

## 2020-09-30 DIAGNOSIS — R21 RASH: ICD-10-CM

## 2020-09-30 DIAGNOSIS — M54.2 CERVICALGIA: ICD-10-CM

## 2020-09-30 DIAGNOSIS — R76.8 POSITIVE ANA (ANTINUCLEAR ANTIBODY): ICD-10-CM

## 2020-09-30 PROCEDURE — 99214 OFFICE O/P EST MOD 30 MIN: CPT | Mod: 95,CR | Performed by: PHYSICIAN ASSISTANT

## 2020-09-30 RX ORDER — CYCLOBENZAPRINE HCL 10 MG
10 TABLET ORAL NIGHTLY PRN
Qty: 30 TAB | Refills: 0 | Status: SHIPPED
Start: 2020-09-30 | End: 2021-01-29

## 2020-09-30 RX ORDER — MOMETASONE FUROATE 1 MG/G
CREAM TOPICAL
Qty: 50 G | Refills: 0 | Status: SHIPPED | OUTPATIENT
Start: 2020-09-30 | End: 2021-04-22 | Stop reason: SDUPTHER

## 2020-09-30 RX ORDER — AZELASTINE HYDROCHLORIDE 137 UG/1
1 SPRAY, METERED NASAL
COMMUNITY
End: 2023-09-01

## 2020-09-30 RX ORDER — FLUTICASONE PROPIONATE 50 MCG
1 SPRAY, SUSPENSION (ML) NASAL
COMMUNITY
End: 2023-09-01

## 2020-09-30 RX ORDER — IBUPROFEN 800 MG/1
800 TABLET ORAL EVERY 8 HOURS PRN
Qty: 90 TAB | Refills: 0 | Status: SHIPPED | OUTPATIENT
Start: 2020-09-30 | End: 2021-11-06 | Stop reason: SDUPTHER

## 2020-09-30 ASSESSMENT — FIBROSIS 4 INDEX: FIB4 SCORE: 0.38

## 2020-09-30 NOTE — ASSESSMENT & PLAN NOTE
Pain is located mostly on the right sided of neck, worse when looking to the left.   Has been using IBU as needed - while helps some.   Baths do help.   Some weakness into the arms.     Was using Flexeril - last tablet was yesterday.     C SPINE 09/2020  IMPRESSION:  Normal cervical spine.  Odontoid process cannot be evaluated due to significant artifact from patient's hair which is superimposed on image.

## 2020-09-30 NOTE — PROGRESS NOTES
This evaluation was conducted via Zoom using secure and encrypted videoconferencing technology. The patient was in a private location in the state of [place].    The patient's identity was confirmed and verbal consent was obtained for this virtual visit.    Subjective:     CC: Follow up; Joint pain   Shy Buchanan is a 25 y.o. female presenting to discuss the evaluation and management of new rash x2-3 days, ongoing neck pain.     Rash  Pt presents today c/o rash,  Under breast, and under axilla.   Was previously using Mometasone which helps.   Requesting refill today.     Rash is very itchy.    Positive SCOTT (antinuclear antibody)  Positive SCOTT 1:160; other testing was negative/normal.   Though she continues to have joint pain in b/l wrists and neck.     Cervicalgia  Pain is located mostly on the right sided of neck, worse when looking to the left.   Has been using IBU as needed - while helps some.   Baths do help.   Some weakness into the arms.     Was using Flexeril - last tablet was yesterday.     C SPINE 09/2020  IMPRESSION:  Normal cervical spine.  Odontoid process cannot be evaluated due to significant artifact from patient's hair which is superimposed on image.          ROS  See HPI  Constitutional: Negative for fever, chills and malaise/fatigue.   Respiratory: Negative for cough and shortness of breath.    Cardiovascular: Negative for leg swelling.   Skin: + rash   MSK: + neck pain   Psychiatric/Behavioral: Negative for depression.  The patient is not nervous/anxious.      Allergies   Allergen Reactions   • Hydrocodone-Acetaminophen Nausea   • Vicodin [Hydrocodone-Acetaminophen] Vomiting   • Latex Hives, Itching and Rash   • Lidocaine        Current medicines (including changes today)  Current Outpatient Medications   Medication Sig Dispense Refill   • mometasone (ELOCON) 0.1 % Cream Apply to affected area BID x14 days. 50 g 0   • cyclobenzaprine (FLEXERIL) 10 MG Tab Take 1 Tab by mouth at bedtime as  needed for Moderate Pain or Muscle Spasms. 30 Tab 0   • ibuprofen (MOTRIN) 800 MG Tab Take 1 Tab by mouth every 8 hours as needed for Moderate Pain. 90 Tab 0   • spironolactone (ALDACTONE) 50 MG Tab Take 1 tablet by mouth at night for 2 weeks, then increase to 2 tablets at night 180 Tab 1   • albuterol 108 (90 Base) MCG/ACT Aero Soln inhalation aerosol Inhale 2 Puffs by mouth every 6 hours as needed for Shortness of Breath.     • ondansetron (ZOFRAN ODT) 4 MG TABLET DISPERSIBLE Take 1 Tab by mouth every 8 hours as needed for Nausea. 10 Tab 0   • Azelastine HCl 137 MCG/SPRAY Solution      • fluticasone (FLONASE) 50 MCG/ACT nasal spray        No current facility-administered medications for this visit.        She  has a past medical history of Healthy adult, Hypertension, and Lupus ([facility]). She also has no past medical history of Asthma or Hyperlipidemia.  She  has a past surgical history that includes appendectomy.      Family History   Problem Relation Age of Onset   • Lung Disease Mother    • Hypertension Mother    • Lung Disease Father    • Lung Disease Sister    • Hypertension Sister      Family Status   Relation Name Status   • Mo  Alive   • Fa  Alive   • Sis 5 Alive   • Bro  Alive       Patient Active Problem List    Diagnosis Date Noted   • Sinusitis 12/30/2014     Priority: High   • Cervicalgia 09/30/2020   • Seizure ([facility]) 09/16/2020   • Fatigue 09/16/2020   • Positive SCOTT (antinuclear antibody) 09/16/2020   • Food intolerance in adult 06/07/2019   • Hyperprolactinemia ([facility]) 06/07/2019   • Endometriosis 05/29/2019   • Intrinsic eczema 05/29/2019   • Non-intractable vomiting with nausea 03/21/2019   • Rash 03/21/2019   • Irritable bowel syndrome with both constipation and diarrhea 03/21/2019   • Headache 12/31/2014   • Leukocytosis 12/31/2014          Objective:   LMP 09/02/2020     Physical Exam:  Constitutional: Alert, no distress, well-groomed.  Skin: No rashes in visible areas.  Eye: Round. Conjunctiva  clear, lids normal. No icterus.   ENMT: Lips pink without lesions, good dentition, moist mucous membranes. Phonation normal.  Neck: No masses, no thyromegaly. Decreased ROM when looking to the left 2/2 right sided subjective neck pain.   Respiratory: Unlabored respiratory effort, no cough or audible wheeze  Psych: Alert and oriented x3, normal affect and mood.       Assessment and Plan:   The following treatment plan was discussed:     1. Rash  New problem  Rx as written  Continue to monitor.   - mometasone (ELOCON) 0.1 % Cream; Apply to affected area BID x14 days.  Dispense: 50 g; Refill: 0    2. Positive SCOTT (antinuclear antibody)  - REFERRAL TO RHEUMATOLOGY    3. Cervicalgia  - REFERRAL TO RHEUMATOLOGY  - cyclobenzaprine (FLEXERIL) 10 MG Tab; Take 1 Tab by mouth at bedtime as needed for Moderate Pain or Muscle Spasms.  Dispense: 30 Tab; Refill: 0  - ibuprofen (MOTRIN) 800 MG Tab; Take 1 Tab by mouth every 8 hours as needed for Moderate Pain.  Dispense: 90 Tab; Refill: 0  Pt was educated on use and SEs of medication.  Can use foam roller to try to stretch this out  Continue to monitor symptoms; consider PT if no improvement.     Other orders  - Azelastine HCl 137 MCG/SPRAY Solution  - fluticasone (FLONASE) 50 MCG/ACT nasal spray        Follow-up: Return if symptoms worsen or fail to improve.    Nohemy Cooper P.A.-C.  Supervising MD: Dr. Yaritza Whitney MD  09/30/20

## 2020-09-30 NOTE — ASSESSMENT & PLAN NOTE
Pt presents today c/o rash,  Under breast, and under axilla.   Was previously using Mometasone which helps.   Requesting refill today.     Rash is very itchy.

## 2020-09-30 NOTE — ASSESSMENT & PLAN NOTE
Positive SCOTT 1:160; other testing was negative/normal.   Though she continues to have joint pain in b/l wrists and neck.

## 2020-10-07 ENCOUNTER — TELEPHONE (OUTPATIENT)
Dept: NEUROLOGY | Facility: MEDICAL CENTER | Age: 25
End: 2020-10-07
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
## 2020-10-08 ENCOUNTER — NON-PROVIDER VISIT (OUTPATIENT)
Dept: NEUROLOGY | Facility: MEDICAL CENTER | Age: 25
End: 2020-10-08
Payer: COMMERCIAL

## 2020-10-08 DIAGNOSIS — R56.9 WITNESSED SEIZURE-LIKE ACTIVITY (HCC): ICD-10-CM

## 2020-10-08 PROCEDURE — 95819 EEG AWAKE AND ASLEEP: CPT | Performed by: PSYCHIATRY & NEUROLOGY

## 2020-10-08 NOTE — TELEPHONE ENCOUNTER
We are awaiting for her work-up to be done. Do they want to start seizure medication?     Thanks,     JH      I left detailed vm for pt and to return my call for any questions.

## 2020-10-08 NOTE — PROCEDURES
ROUTINE ELECTROENCEPHALOGRAM REPORT      Referring provider: SHARRON Palmer.    DOS: 10/8/2020 (total recording of 33 minutes)    INDICATION:  Shy Buchanan 25 y.o. female presenting with history of seizure.    CURRENT ANTIEPILEPTIC REGIMEN: None.    TECHNIQUE: 30 channel routine electroencephalogram (EEG) was performed in accordance with the international 10-20 system. The study was reviewed in bipolar and referential montages. The recording examined the patient during wakeful and drowsy/sleep state(s).     DESCRIPTION OF THE RECORD:  During the wakefulness, the background showed a symmetrical 10 hz alpha activity posteriorly with amplitude of 70 mV.  There was reactivity to eye closure/opening.  A normal anterior-posterior gradient was noted with faster beta frequencies seen anteriorly.  During drowsiness, theta/delta frequencies were seen.    During the sleep state, background shows diffuse high-amplitude 4-5 Hz delta activity.  Symmetrical high-amplitude sleep spindles and vertex sharps were seen in the leads over the central regions.     ACTIVATION PROCEDURES:   Hyperventilation was performed by the patient for a total of 3 minutes. The technician performing the test noted good effort. This technique produced electroencephalographic changes in keeping with the expected bilaterally synchronous, frontally predominant, high amplitude slow waves build up.     Intermittent Photic stimulation was performed in a stepwise fashion from 1 to 30 Hz and elicited a normal response (photic driving), most noticeable in the posterior leads.      ICTAL AND/OR INTERICTAL FINDINGS:   No focal or generalized epileptiform activity noted. No regional slowing was seen during this routine study.  No clinical events or seizures were reported or recorded during the study.     EKG: sampling of the EKG recording demonstrated sinus rhythm.       INTERPRETATION:  This is a normal routine EEG recording in the awake, drowsy/sleep  state(s).  Clinical correlation is recommended.    Note: A normal EEG does not rule out epilepsy.  If the clinical suspicion remains high for seizures, a prolonged recording to capture clinical or subclinical events may be helpful.        Vadim Abarca MD   Epilepsy and Neurodiagnostics.   Clinical  of Neurology [facility].   Diplomate in Neurology, Epilepsy, and Electrodiagnostic Medicine.   Office: [phone]  Fax: [phone]

## 2020-10-13 ENCOUNTER — APPOINTMENT (RX ONLY)
Dept: URBAN - METROPOLITAN AREA CLINIC 4 | Facility: CLINIC | Age: 25
Setting detail: DERMATOLOGY
End: 2020-10-13

## 2020-10-13 DIAGNOSIS — L30.9 DERMATITIS, UNSPECIFIED: ICD-10-CM

## 2020-10-13 DIAGNOSIS — D22 MELANOCYTIC NEVI: ICD-10-CM

## 2020-10-13 DIAGNOSIS — L70.0 ACNE VULGARIS: ICD-10-CM

## 2020-10-13 PROBLEM — D22.5 MELANOCYTIC NEVI OF TRUNK: Status: ACTIVE | Noted: 2020-10-13

## 2020-10-13 PROBLEM — D22.39 MELANOCYTIC NEVI OF OTHER PARTS OF FACE: Status: ACTIVE | Noted: 2020-10-13

## 2020-10-13 PROCEDURE — ? COUNSELING

## 2020-10-13 PROCEDURE — ? PRESCRIPTION

## 2020-10-13 PROCEDURE — 99202 OFFICE O/P NEW SF 15 MIN: CPT

## 2020-10-13 PROCEDURE — ? PATIENT SPECIFIC COUNSELING

## 2020-10-13 RX ORDER — TRIAMCINOLONE ACETONIDE 1 MG/G
1 CREAM TOPICAL BID
Qty: 1 | Refills: 2 | Status: ERX

## 2020-10-13 ASSESSMENT — LOCATION ZONE DERM
LOCATION ZONE: TRUNK
LOCATION ZONE: FACE

## 2020-10-13 ASSESSMENT — LOCATION DETAILED DESCRIPTION DERM
LOCATION DETAILED: RIGHT LATERAL BUCCAL CHEEK
LOCATION DETAILED: LEFT RIB CAGE
LOCATION DETAILED: INFERIOR THORACIC SPINE
LOCATION DETAILED: RIGHT LATERAL EYEBROW
LOCATION DETAILED: PERIUMBILICAL SKIN

## 2020-10-13 ASSESSMENT — LOCATION SIMPLE DESCRIPTION DERM
LOCATION SIMPLE: UPPER BACK
LOCATION SIMPLE: ABDOMEN
LOCATION SIMPLE: RIGHT EYEBROW
LOCATION SIMPLE: RIGHT CHEEK

## 2020-10-13 NOTE — PROCEDURE: PATIENT SPECIFIC COUNSELING
Occurs during menstruation. Flares up with dairy and sugar. Has used Cetaphil wash. We will sample retin a micro today.
Detail Level: Zone

## 2020-10-13 NOTE — PROCEDURE: COUNSELING
Detail Level: Zone
Detail Level: Detailed
Patient Specific Counseling (Will Not Stick From Patient To Patient): Pt. Has hx of lupus, has been using Montesone 0.1% cream, not helping. Her eases spread very quickly. This has been present for 4days. Discussed using free and clear detergent and soaps. We will prescribe triamcinolone today. Pt. Advised to fu if this worsens or fails to resolve in the next 30 days. We will schedule follow up in case she needs it.

## 2020-10-23 ENCOUNTER — TELEPHONE (OUTPATIENT)
Dept: NEUROLOGY | Facility: MEDICAL CENTER | Age: 25
End: 2020-10-23

## 2020-10-23 NOTE — TELEPHONE ENCOUNTER
Patient's boyfriend stated just awhile ago pt was showering and had a seizure, no convulsion, no injury but stated she was a little confused. I stated to bf to give pt sometime to come to. He just wanted to let the provider know. Pt has a f/v scheduled to come in on 10/27/2020.
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
## 2020-10-27 ENCOUNTER — OFFICE VISIT (OUTPATIENT)
Dept: NEUROLOGY | Facility: MEDICAL CENTER | Age: 25
End: 2020-10-27
Payer: COMMERCIAL

## 2020-10-27 VITALS
WEIGHT: 177.03 LBS | HEART RATE: 88 BPM | DIASTOLIC BLOOD PRESSURE: 76 MMHG | OXYGEN SATURATION: 100 % | SYSTOLIC BLOOD PRESSURE: 122 MMHG | BODY MASS INDEX: 27.32 KG/M2 | RESPIRATION RATE: 14 BRPM | TEMPERATURE: 97.3 F

## 2020-10-27 DIAGNOSIS — Z30.09 ENCOUNTER FOR FEMALE FAMILY PLANNING COUNSELING: ICD-10-CM

## 2020-10-27 DIAGNOSIS — Z30.09 ENCOUNTER FOR COUNSELING REGARDING CONTRACEPTION: ICD-10-CM

## 2020-10-27 DIAGNOSIS — G44.52 NEW DAILY PERSISTENT HEADACHE: ICD-10-CM

## 2020-10-27 DIAGNOSIS — G40.109 LOCALIZATION-RELATED EPILEPSY (HCC): ICD-10-CM

## 2020-10-27 DIAGNOSIS — F32.A DEPRESSION, UNSPECIFIED DEPRESSION TYPE: ICD-10-CM

## 2020-10-27 DIAGNOSIS — Z13.31 SCREENING FOR DEPRESSION: ICD-10-CM

## 2020-10-27 PROCEDURE — 99214 OFFICE O/P EST MOD 30 MIN: CPT | Performed by: NURSE PRACTITIONER

## 2020-10-27 RX ORDER — LAMOTRIGINE 25 MG/1
100 TABLET ORAL 2 TIMES DAILY
Qty: 240 TAB | Refills: 11 | Status: SHIPPED | OUTPATIENT
Start: 2020-10-27 | End: 2021-01-29 | Stop reason: SDUPTHER

## 2020-10-27 RX ORDER — FOLIC ACID 1 MG/1
2 TABLET ORAL DAILY
Qty: 60 TAB | Refills: 11 | Status: SHIPPED | OUTPATIENT
Start: 2020-10-27 | End: 2021-11-06 | Stop reason: SDUPTHER

## 2020-10-27 ASSESSMENT — FIBROSIS 4 INDEX: FIB4 SCORE: 0.38

## 2020-10-27 NOTE — PATIENT INSTRUCTIONS
Lamotrgine: Take 1 tab twice a day for 2 weeks, then 2 tabs twice a day for another 2 weeks, then 3 tabs twice a day for another 2 weeks, then 4 tabs twice a day thereafter.
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
## 2020-11-16 ENCOUNTER — HOSPITAL ENCOUNTER (OUTPATIENT)
Dept: RADIOLOGY | Facility: MEDICAL CENTER | Age: 25
End: 2020-11-16
Attending: NURSE PRACTITIONER
Payer: COMMERCIAL

## 2020-11-16 ENCOUNTER — HOSPITAL ENCOUNTER (OUTPATIENT)
Dept: RADIOLOGY | Facility: MEDICAL CENTER | Age: 25
End: 2020-11-16
Attending: INTERNAL MEDICINE
Payer: COMMERCIAL

## 2020-11-16 DIAGNOSIS — R63.4 ABNORMAL WEIGHT LOSS: ICD-10-CM

## 2020-11-16 DIAGNOSIS — R10.31 RLQ ABDOMINAL PAIN: ICD-10-CM

## 2020-11-16 DIAGNOSIS — R11.2 NAUSEA AND VOMITING, INTRACTABILITY OF VOMITING NOT SPECIFIED, UNSPECIFIED VOMITING TYPE: ICD-10-CM

## 2020-11-16 DIAGNOSIS — R56.9 WITNESSED SEIZURE-LIKE ACTIVITY (HCC): ICD-10-CM

## 2020-11-16 PROCEDURE — 76700 US EXAM ABDOM COMPLETE: CPT

## 2020-11-16 PROCEDURE — 70553 MRI BRAIN STEM W/O & W/DYE: CPT

## 2020-11-16 PROCEDURE — 700117 HCHG RX CONTRAST REV CODE 255: Performed by: NURSE PRACTITIONER

## 2020-11-16 PROCEDURE — A9576 INJ PROHANCE MULTIPACK: HCPCS | Performed by: NURSE PRACTITIONER

## 2020-11-16 RX ADMIN — GADOTERIDOL 15 ML: 279.3 INJECTION, SOLUTION INTRAVENOUS at 08:44

## 2020-11-18 ENCOUNTER — TELEPHONE (OUTPATIENT)
Dept: NEUROLOGY | Facility: MEDICAL CENTER | Age: 25
End: 2020-11-18

## 2020-11-18 NOTE — TELEPHONE ENCOUNTER
Pt stated per her dentist they can not do her fillings because they are worried that the lidocaine with trigger a seizure. Pt wanted to know if this is true.

## 2020-11-30 ENCOUNTER — TELEPHONE (OUTPATIENT)
Dept: MEDICAL GROUP | Facility: LAB | Age: 25
End: 2020-11-30

## 2020-11-30 ENCOUNTER — HOSPITAL ENCOUNTER (OUTPATIENT)
Dept: LAB | Facility: MEDICAL CENTER | Age: 25
End: 2020-11-30
Attending: PHYSICIAN ASSISTANT
Payer: COMMERCIAL

## 2020-11-30 DIAGNOSIS — Z11.59 SCREENING FOR VIRAL DISEASE: ICD-10-CM

## 2020-11-30 LAB — COVID ORDER STATUS COVID19: NORMAL

## 2020-11-30 PROCEDURE — C9803 HOPD COVID-19 SPEC COLLECT: HCPCS

## 2020-11-30 PROCEDURE — U0003 INFECTIOUS AGENT DETECTION BY NUCLEIC ACID (DNA OR RNA); SEVERE ACUTE RESPIRATORY SYNDROME CORONAVIRUS 2 (SARS-COV-2) (CORONAVIRUS DISEASE [COVID-19]), AMPLIFIED PROBE TECHNIQUE, MAKING USE OF HIGH THROUGHPUT TECHNOLOGIES AS DESCRIBED BY CMS-2020-01-R: HCPCS

## 2020-11-30 NOTE — TELEPHONE ENCOUNTER
Patient was exposed to someone who tested positive for COVID this morning (11/30/2020). Stayed with this person from 11/24-11/29 and has felt fatigued since 11/27/2020. Patient states she does have lupus so she is unsure if the fatigue could be from preexisting cause.

## 2020-12-01 LAB
SARS-COV-2 RNA RESP QL NAA+PROBE: NOTDETECTED
SPECIMEN SOURCE: NORMAL

## 2021-01-19 ENCOUNTER — NON-PROVIDER VISIT (OUTPATIENT)
Dept: NEUROLOGY | Facility: MEDICAL CENTER | Age: 26
End: 2021-01-19
Attending: NURSE PRACTITIONER
Payer: COMMERCIAL

## 2021-01-19 DIAGNOSIS — G40.109 LOCALIZATION-RELATED EPILEPSY (HCC): ICD-10-CM

## 2021-01-19 PROCEDURE — 95700 EEG CONT REC W/VID EEG TECH: CPT | Performed by: STUDENT IN AN ORGANIZED HEALTH CARE EDUCATION/TRAINING PROGRAM

## 2021-01-19 PROCEDURE — 95719 EEG PHYS/QHP EA INCR W/O VID: CPT | Performed by: STUDENT IN AN ORGANIZED HEALTH CARE EDUCATION/TRAINING PROGRAM

## 2021-01-19 PROCEDURE — 95708 EEG WO VID EA 12-26HR UNMNTR: CPT | Performed by: STUDENT IN AN ORGANIZED HEALTH CARE EDUCATION/TRAINING PROGRAM

## 2021-01-19 NOTE — PROCEDURES
AMBULATORY ELECTROENCEPHALOGRAM REPORT      Referring provider:   LAWRENCE Palmer  [address] [place] [address]      DOS: 1/19/2021      Duration of Recording: (23 hours and 36 minutes)      INDICATION:  Syh Buchanan 25 y.o. female presenting with seizures    CURRENT OUTPATIENT MEDICATION LIST:  Current Outpatient Medications   Medication Instructions   • albuterol 108 (90 Base) MCG/ACT Aero Soln inhalation aerosol 2 Puffs, Inhalation, EVERY 6 HOURS PRN   • Azelastine HCl 137 MCG/SPRAY Solution No dose, route, or frequency recorded.   • cyclobenzaprine (FLEXERIL) 10 mg, Oral, NIGHTLY PRN   • fluticasone (FLONASE) 50 MCG/ACT nasal spray No dose, route, or frequency recorded.   • folic acid (FOLVITE) 2 mg, Oral, DAILY   • ibuprofen (MOTRIN) 800 mg, Oral, EVERY 8 HOURS PRN   • lamoTRIgine (LAMICTAL) 100 mg, Oral, 2 TIMES DAILY, Lamotrgine: Take 1 tab twice a day for 2 weeks, then 2 tabs twice a day for another 2 weeks, then 3 tabs twice a day for another 2 weeks, then 4 tabs twice a day thereafter.   • mometasone (ELOCON) 0.1 % Cream Apply to affected area BID x14 days.   • ondansetron (ZOFRAN ODT) 4 mg, Oral, EVERY 8 HOURS PRN   • spironolactone (ALDACTONE) 50 MG Tab Take 1 tablet by mouth at night for 2 weeks, then increase to 2 tablets at night         TECHNIQUE: A 30-channel ambulatory electroencephalogram (aEEG) was performed in accordance with the international 10-20 system. This digital study was reviewed in bipolar and referential montages. The recording examined the patient during wakeful, drowsy and sleep states.     DESCRIPTION OF THE RECORD:  During the awake state, background shows symmetrical 9-10 Hz alpha activity posteriorly with amplitude of 70 mV.  There was reactivity with eye opening/closure.  Normal anterior-posterior gradient was noted with faster beta frequencies seen anteriorly.  During drowsiness, high-amplitude delta frequencies were seen.    During the sleep  state, background shows diffuse high-amplitude 4-5 Hz delta activity.  Symmetrical high-amplitude sleep spindles and vertex sharp activities were seen in the central leads.    ACTIVATION PROCEDURES:   Hyperventilation was performed by the patient for a total of 3 minutes. The technician performing the test noted good effort. This technique produced electroencephalographic changes in keeping with the expected bilaterally synchronous, frontally predominant, high amplitude slow waves build up.     Intermittent Photic stimulation was performed in a stepwise fashion from 1 to 30 Hz and elicited a normal response (photic driving), most noticeable in the posterior leads.    ICTAL AND INTERICTAL FINDINGS:   No focal or generalized epileptiform activity noted.     No regional slowing was seen during this routine study.      No clinical events or seizures were reported or recorded during the study.     EKG: sampling of the EKG recording did not demonstrate any abnormalities    EVENTS:      Three ambulatory diary events noted at 213 PM and 313 PM for symptoms of fatigue and drowsy, and at 6:15 PM for symptoms of fatigue and headache. Clinical events were without an abnormal EEG correlate.      INTERPRETATION:  This is a normal ambulatory EEG recording in the awake and drowsy/sleep state(s). Three ambulatory diary events noted at 213 PM and 313 PM for symptoms of fatigue and drowsy, and at 6:15 PM for symptoms of fatigue and headache. Clinical events were without an abnormal EEG correlate. Clinical correlation is recommended.            Scotty Boyd MD  Epilepsy and General Neurology  Department of Neurology  Clinical  of Neurology [facility].

## 2021-01-20 ENCOUNTER — NON-PROVIDER VISIT (OUTPATIENT)
Dept: NEUROLOGY | Facility: MEDICAL CENTER | Age: 26
End: 2021-01-20
Attending: NURSE PRACTITIONER
Payer: COMMERCIAL

## 2021-01-20 PROCEDURE — 99999 PR NO CHARGE: CPT | Performed by: STUDENT IN AN ORGANIZED HEALTH CARE EDUCATION/TRAINING PROGRAM
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
## 2021-01-29 ENCOUNTER — TELEMEDICINE (OUTPATIENT)
Dept: NEUROLOGY | Facility: MEDICAL CENTER | Age: 26
End: 2021-01-29
Attending: NURSE PRACTITIONER
Payer: COMMERCIAL

## 2021-01-29 DIAGNOSIS — F32.A ANXIETY AND DEPRESSION: ICD-10-CM

## 2021-01-29 DIAGNOSIS — G40.109 LOCALIZATION-RELATED EPILEPSY (HCC): ICD-10-CM

## 2021-01-29 DIAGNOSIS — Z30.09 ENCOUNTER FOR COUNSELING REGARDING CONTRACEPTION: ICD-10-CM

## 2021-01-29 DIAGNOSIS — Z13.31 SCREENING FOR DEPRESSION: ICD-10-CM

## 2021-01-29 DIAGNOSIS — Z30.09 ENCOUNTER FOR FEMALE FAMILY PLANNING COUNSELING: ICD-10-CM

## 2021-01-29 DIAGNOSIS — Z02.4 ENCOUNTER FOR EXAMINATION FOR DRIVING LICENSE: ICD-10-CM

## 2021-01-29 DIAGNOSIS — F41.9 ANXIETY AND DEPRESSION: ICD-10-CM

## 2021-01-29 PROCEDURE — 99214 OFFICE O/P EST MOD 30 MIN: CPT | Mod: 95,CR | Performed by: NURSE PRACTITIONER

## 2021-01-29 RX ORDER — LAMOTRIGINE 100 MG/1
100 TABLET ORAL 2 TIMES DAILY
Qty: 60 TAB | Refills: 3 | Status: SHIPPED | OUTPATIENT
Start: 2021-01-29 | End: 2021-11-06 | Stop reason: SDUPTHER

## 2021-01-29 ASSESSMENT — PATIENT HEALTH QUESTIONNAIRE - PHQ9: CLINICAL INTERPRETATION OF PHQ2 SCORE: 0

## 2021-02-17 ENCOUNTER — TELEMEDICINE (OUTPATIENT)
Dept: TELEHEALTH | Facility: TELEMEDICINE | Age: 26
End: 2021-02-17
Payer: COMMERCIAL

## 2021-02-17 ENCOUNTER — TELEPHONE (OUTPATIENT)
Dept: URGENT CARE | Facility: CLINIC | Age: 26
End: 2021-02-17

## 2021-02-17 DIAGNOSIS — T78.40XA ALLERGIC REACTION, INITIAL ENCOUNTER: Primary | ICD-10-CM

## 2021-02-17 PROCEDURE — 99213 OFFICE O/P EST LOW 20 MIN: CPT | Mod: 95,CR | Performed by: PHYSICIAN ASSISTANT

## 2021-02-17 RX ORDER — MONTELUKAST SODIUM 10 MG/1
TABLET ORAL
COMMUNITY
Start: 2021-02-02 | End: 2021-03-12 | Stop reason: SDUPTHER

## 2021-02-17 RX ORDER — METHYLPREDNISOLONE 4 MG/1
4 TABLET ORAL DAILY
Qty: 21 EACH | Refills: 0 | Status: SHIPPED | OUTPATIENT
Start: 2021-02-17 | End: 2021-02-23

## 2021-02-17 ASSESSMENT — ENCOUNTER SYMPTOMS
COUGH: 0
NAUSEA: 0
VOMITING: 0
DIARRHEA: 0
SHORTNESS OF BREATH: 0
ABDOMINAL PAIN: 0
CONSTIPATION: 0
CHILLS: 0
FEVER: 0

## 2021-02-17 NOTE — PROGRESS NOTES
Telemedicine Visit: Established Patient     This encounter was conducted via Zoom.   Verbal consent was obtained. Patient's identity was verified.    Subjective:   CC:   Chief Complaint   Patient presents with   • Allergic Reaction     Shy Buchanan is a 25 y.o. female presenting for evaluation and management of:    Allergic Reaction  This is a new problem. Episode onset: 2 days  The problem occurs constantly. The problem has been unchanged. Associated symptoms include a rash. Pertinent negatives include no abdominal pain, chills, coughing, fever, nausea or vomiting. Treatments tried: benadryl      Pt has hx of multiple allergic reactions in the past. She recently had a false eyelash treatment. She thinks she may have an allergy to the glue. Shortly after the treatment her eyelids started to swell. She has since removed lashes. No SOB, stridor, throat swelling. She has noticed a red itchy rash start on neck and is spreading to torso     Review of Systems   Constitutional: Negative for chills, fever and malaise/fatigue.   Respiratory: Negative for cough and shortness of breath.    Gastrointestinal: Negative for abdominal pain, constipation, diarrhea, nausea and vomiting.   Skin: Positive for itching and rash.   All other systems reviewed and are negative.        Allergies   Allergen Reactions   • Hydrocodone-Acetaminophen Nausea   • Vicodin [Hydrocodone-Acetaminophen] Vomiting   • Latex Hives, Itching and Rash   • Lidocaine        Current medicines (including changes today)  Current Outpatient Medications   Medication Sig Dispense Refill   • methylPREDNISolone (MEDROL DOSEPAK) 4 MG Tablet Therapy Pack Take 1 tablet by mouth every day for 6 days. Take as tapered-dosage schedule 21 Each 0   • montelukast (SINGULAIR) 10 MG Tab      • lamoTRIgine (LAMICTAL) 100 MG Tab Take 1 Tab by mouth 2 times a day. 60 Tab 3   • folic acid (FOLVITE) 1 MG Tab Take 2 Tabs by mouth every day. 60 Tab 11   • Azelastine HCl 137  MCG/SPRAY Solution      • fluticasone (FLONASE) 50 MCG/ACT nasal spray      • mometasone (ELOCON) 0.1 % Cream Apply to affected area BID x14 days. 50 g 0   • ibuprofen (MOTRIN) 800 MG Tab Take 1 Tab by mouth every 8 hours as needed for Moderate Pain. 90 Tab 0   • albuterol 108 (90 Base) MCG/ACT Aero Soln inhalation aerosol Inhale 2 Puffs by mouth every 6 hours as needed for Shortness of Breath.     • ondansetron (ZOFRAN ODT) 4 MG TABLET DISPERSIBLE Take 1 Tab by mouth every 8 hours as needed for Nausea. 10 Tab 0     No current facility-administered medications for this visit.       Patient Active Problem List    Diagnosis Date Noted   • Sinusitis 12/30/2014     Priority: High   • Cervicalgia 09/30/2020   • Seizure (HCC) 09/16/2020   • Fatigue 09/16/2020   • Positive SCOTT (antinuclear antibody) 09/16/2020   • Food intolerance in adult 06/07/2019   • Hyperprolactinemia (HCC) 06/07/2019   • Endometriosis 05/29/2019   • Intrinsic eczema 05/29/2019   • Non-intractable vomiting with nausea 03/21/2019   • Rash 03/21/2019   • Irritable bowel syndrome with both constipation and diarrhea 03/21/2019   • Headache 12/31/2014   • Leukocytosis 12/31/2014       Family History   Problem Relation Age of Onset   • Lung Disease Mother    • Hypertension Mother    • Lung Disease Father    • Lung Disease Sister    • Hypertension Sister        PMH: has a past medical history of Healthy adult, Hypertension, and Lupus (HCC). She also has no past medical history of Asthma or Hyperlipidemia.  Surgical Hx:   Past Surgical History:   Procedure Laterality Date   • APPENDECTOMY        Social hx:   Social History     Tobacco Use   • Smoking status: Never Smoker   • Smokeless tobacco: Never Used   Substance Use Topics   • Alcohol use: Yes     Comment: occ   • Drug use: Yes     Types: Marijuana               Objective:   Vitals obtained by patient:  There were no vitals taken for this visit.    Physical Exam:  Constitutional: Alert, no distress,  well-groomed.  Skin: visible swelling. Erythema to bilateral eyelids.   Eye: Round. Conjunctiva clear. No icterus.   ENMT: Lips pink without lesions, good dentition, moist mucous membranes. Phonation normal.  Neck: No masses, no thyromegaly. Moves freely without pain.  CV: Pulse as reported by patient  Respiratory: Unlabored respiratory effort, no cough or audible wheeze  Psych: Alert and oriented x3, normal affect and mood.       Assessment and Plan:   The following treatment plan was discussed:     1. Allergic reaction, initial encounter  - methylPREDNISolone (MEDROL DOSEPAK) 4 MG Tablet Therapy Pack; Take 1 tablet by mouth every day for 6 days. Take as tapered-dosage schedule  Dispense: 21 Each; Refill: 0    Other orders  - montelukast (SINGULAIR) 10 MG Tab        Supportive care reviewed. Monitor sx closely.     If symptoms worsen or persist patient can return to clinic for reevaluation.  Red flags and emergency room precautions discussed. All side effects of medication discussed including allergic response, GI upset, tendon injury, etc. Patient confirmed understanding of information.      Follow-up: Return if symptoms worsen or fail to improve.

## 2021-03-12 ENCOUNTER — OFFICE VISIT (OUTPATIENT)
Dept: MEDICAL GROUP | Facility: LAB | Age: 26
End: 2021-03-12
Payer: COMMERCIAL

## 2021-03-12 VITALS
TEMPERATURE: 98.6 F | WEIGHT: 180 LBS | DIASTOLIC BLOOD PRESSURE: 76 MMHG | RESPIRATION RATE: 13 BRPM | SYSTOLIC BLOOD PRESSURE: 122 MMHG | HEIGHT: 70 IN | OXYGEN SATURATION: 96 % | HEART RATE: 79 BPM | BODY MASS INDEX: 25.77 KG/M2

## 2021-03-12 DIAGNOSIS — T78.40XS ALLERGY, SEQUELA: ICD-10-CM

## 2021-03-12 DIAGNOSIS — L20.84 INTRINSIC ECZEMA: ICD-10-CM

## 2021-03-12 PROCEDURE — 99213 OFFICE O/P EST LOW 20 MIN: CPT | Performed by: FAMILY MEDICINE

## 2021-03-12 RX ORDER — MONTELUKAST SODIUM 10 MG/1
10 TABLET ORAL DAILY
Qty: 90 TABLET | Refills: 0 | Status: SHIPPED | OUTPATIENT
Start: 2021-03-12 | End: 2021-07-05

## 2021-03-12 ASSESSMENT — FIBROSIS 4 INDEX: FIB4 SCORE: 0.38

## 2021-03-12 NOTE — PROGRESS NOTES
Subjective:   Shy Buchanan is a 25 y.o. female here today for   Chief Complaint   Patient presents with   • Rash     Urgent care follow up, unsure if from allergic reaction    • Ringing in Ear     head pressure      Rash:  Was seen in urgent care 2/17/21 for rash. Was given a steroid. Has reduced, but is now intermittent. Flares up then calms.   She does think this is seasonal perhaps as well.   Under eyes seem the worst, keeps coming back.   On further questioning it seems this may be chronic, fluctuating.   When rash resolves she does continue to have itching.   Does also have dry, but then watery eyes. Does use oatmeal baths as well.   Location: around eyes, flank, back of arms.   Stopped zyrtec due to seizure med interactions.   Uses 2 nasal sprays, and inhaler, and singulair.   Has seen allergist in the past.   Using Cetaphil lotion as well.     Ringing in ears:   Pressure in head. No congestion reported. Has message into neurology to discuss if this is related to seizures or meds. Reports it gets so bad she spent a whole day in bed.       Allergies   Allergen Reactions   • Hydrocodone-Acetaminophen Nausea   • Vicodin [Hydrocodone-Acetaminophen] Vomiting   • Latex Hives, Itching and Rash   • Lidocaine          Current medicines (including changes today)  Current Outpatient Medications   Medication Sig Dispense Refill   • montelukast (SINGULAIR) 10 MG Tab      • lamoTRIgine (LAMICTAL) 100 MG Tab Take 1 Tab by mouth 2 times a day. 60 Tab 3   • folic acid (FOLVITE) 1 MG Tab Take 2 Tabs by mouth every day. 60 Tab 11   • Azelastine HCl 137 MCG/SPRAY Solution      • fluticasone (FLONASE) 50 MCG/ACT nasal spray      • mometasone (ELOCON) 0.1 % Cream Apply to affected area BID x14 days. 50 g 0   • ibuprofen (MOTRIN) 800 MG Tab Take 1 Tab by mouth every 8 hours as needed for Moderate Pain. 90 Tab 0   • albuterol 108 (90 Base) MCG/ACT Aero Soln inhalation aerosol Inhale 2 Puffs by mouth every 6 hours as needed for  "Shortness of Breath.     • ondansetron (ZOFRAN ODT) 4 MG TABLET DISPERSIBLE Take 1 Tab by mouth every 8 hours as needed for Nausea. 10 Tab 0     No current facility-administered medications for this visit.     She  has a past medical history of Healthy adult, Hypertension, and Lupus (HCC). She also has no past medical history of Asthma or Hyperlipidemia.    ROS   -as above       Objective:     Physical Exam:  /76   Pulse 79   Temp 37 °C (98.6 °F) (Temporal)   Resp 13   Ht 1.77 m (5' 9.69\")   Wt 81.6 kg (180 lb)   SpO2 96%  Body mass index is 26.06 kg/m².   Constitutional: Alert, no distress.  Skin: Warm, dry, good turgor.  Scattered, erythematous patches noted on right side of neck, dorsal aspect of right arm.  Excoriation marks also noted.  Eye: Equal, round and reactive, conjunctiva clear, lids normal.  ENMT: TM's clear bilaterally, lips without lesions, good dentition, oropharynx clear.  Neck: Trachea midline, no masses, no thyromegaly. No cervical or supraclavicular lymphadenopathy.  Respiratory: Unlabored respiratory effort, no audible wheezing   psych: Alert and oriented x3, normal affect and mood.    Assessment and Plan:     1. Intrinsic eczema  -Signs and symptoms consistent with eczema, discussed conservative treatment measures with emollient creams to be used right after showering.  Continue use of steroid cream as needed.  -The symptoms also to be connected with allergic symptoms and encourage patient to continue Singulair and restart antihistamine at this time.  -No red flag symptoms, patient will follow-up as needed.  - montelukast (SINGULAIR) 10 MG Tab; Take 1 tablet by mouth every day for 90 days.  Dispense: 90 tablet; Refill: 0    2. Allergy, sequela  -See above.  Will refill patient's Singulair.  - montelukast (SINGULAIR) 10 MG Tab; Take 1 tablet by mouth every day for 90 days.  Dispense: 90 tablet; Refill: 0      Followup: Return if symptoms worsen or fail to improve.         PLEASE " NOTE: This dictation was created using voice recognition software. I have made every reasonable attempt to correct obvious errors, but I expect that there are errors of grammar and possibly content that I did not discover before finalizing the note.

## 2021-04-22 ENCOUNTER — OFFICE VISIT (OUTPATIENT)
Dept: MEDICAL GROUP | Facility: LAB | Age: 26
End: 2021-04-22
Payer: COMMERCIAL

## 2021-04-22 VITALS
BODY MASS INDEX: 25.96 KG/M2 | DIASTOLIC BLOOD PRESSURE: 84 MMHG | SYSTOLIC BLOOD PRESSURE: 122 MMHG | WEIGHT: 181.3 LBS | TEMPERATURE: 98.1 F | HEART RATE: 90 BPM | RESPIRATION RATE: 16 BRPM | OXYGEN SATURATION: 98 % | HEIGHT: 70 IN

## 2021-04-22 DIAGNOSIS — J45.20 MILD INTERMITTENT ASTHMA, UNSPECIFIED WHETHER COMPLICATED: ICD-10-CM

## 2021-04-22 DIAGNOSIS — M77.8 TENDONITIS OF WRIST, LEFT: ICD-10-CM

## 2021-04-22 DIAGNOSIS — L40.9 PSORIASIS: ICD-10-CM

## 2021-04-22 DIAGNOSIS — M25.532 LEFT WRIST PAIN: ICD-10-CM

## 2021-04-22 DIAGNOSIS — R21 RASH: ICD-10-CM

## 2021-04-22 PROCEDURE — 99214 OFFICE O/P EST MOD 30 MIN: CPT | Performed by: PHYSICIAN ASSISTANT

## 2021-04-22 RX ORDER — ALBUTEROL SULFATE 90 UG/1
2 AEROSOL, METERED RESPIRATORY (INHALATION) EVERY 6 HOURS PRN
Qty: 8.5 G | Refills: 2 | Status: SHIPPED
Start: 2021-04-22 | End: 2021-07-05

## 2021-04-22 RX ORDER — MOMETASONE FUROATE 1 MG/G
CREAM TOPICAL
Qty: 50 G | Refills: 1 | Status: SHIPPED
Start: 2021-04-22 | End: 2021-07-05

## 2021-04-22 RX ORDER — NORTRIPTYLINE HYDROCHLORIDE 10 MG/1
10 CAPSULE ORAL
COMMUNITY
Start: 2021-04-03 | End: 2023-03-03

## 2021-04-22 ASSESSMENT — FIBROSIS 4 INDEX: FIB4 SCORE: 0.38

## 2021-04-22 NOTE — ASSESSMENT & PLAN NOTE
"Follow up; flaring of eczema.   Has been using Mometasone prn which seemed to help in the past.   One lesion on the left side of her neck continues to be flared despite use of cream. This rash is more \"flaky\" and burns.   "

## 2021-04-22 NOTE — ASSESSMENT & PLAN NOTE
New to me; seems to be chronic left wrist pain that was previously intermittent, now becoming constant x3 weeks.   Having some shooting pain, worse with movement.   Hindering her from doing activities such as yoga.   Also have decreased  strength.     Denies neck pain.   Using IBU with some relief.

## 2021-04-24 ENCOUNTER — HOSPITAL ENCOUNTER (EMERGENCY)
Facility: MEDICAL CENTER | Age: 26
End: 2021-04-24
Attending: EMERGENCY MEDICINE
Payer: COMMERCIAL

## 2021-04-24 ENCOUNTER — OFFICE VISIT (OUTPATIENT)
Dept: URGENT CARE | Facility: PHYSICIAN GROUP | Age: 26
End: 2021-04-24
Payer: COMMERCIAL

## 2021-04-24 VITALS
BODY MASS INDEX: 27.62 KG/M2 | HEART RATE: 88 BPM | TEMPERATURE: 97.9 F | OXYGEN SATURATION: 96 % | RESPIRATION RATE: 85 BRPM | WEIGHT: 176 LBS | DIASTOLIC BLOOD PRESSURE: 94 MMHG | SYSTOLIC BLOOD PRESSURE: 128 MMHG | HEIGHT: 67 IN

## 2021-04-24 DIAGNOSIS — R55 SYNCOPE, UNSPECIFIED SYNCOPE TYPE: ICD-10-CM

## 2021-04-24 DIAGNOSIS — R56.9 SEIZURE (HCC): ICD-10-CM

## 2021-04-24 DIAGNOSIS — T78.40XA ALLERGIC REACTION, INITIAL ENCOUNTER: ICD-10-CM

## 2021-04-24 PROCEDURE — 700102 HCHG RX REV CODE 250 W/ 637 OVERRIDE(OP): Performed by: EMERGENCY MEDICINE

## 2021-04-24 PROCEDURE — A9270 NON-COVERED ITEM OR SERVICE: HCPCS | Performed by: EMERGENCY MEDICINE

## 2021-04-24 PROCEDURE — 99283 EMERGENCY DEPT VISIT LOW MDM: CPT

## 2021-04-24 PROCEDURE — 99999 PR NO CHARGE: CPT | Performed by: FAMILY MEDICINE

## 2021-04-24 PROCEDURE — 700111 HCHG RX REV CODE 636 W/ 250 OVERRIDE (IP): Performed by: EMERGENCY MEDICINE

## 2021-04-24 RX ORDER — PREDNISONE 20 MG/1
20 TABLET ORAL ONCE
Status: COMPLETED | OUTPATIENT
Start: 2021-04-24 | End: 2021-04-24

## 2021-04-24 RX ORDER — PREDNISONE 20 MG/1
20 TABLET ORAL 2 TIMES DAILY
Qty: 10 TABLET | Refills: 0 | Status: SHIPPED | OUTPATIENT
Start: 2021-04-24 | End: 2021-04-29

## 2021-04-24 RX ORDER — FAMOTIDINE 20 MG/1
20 TABLET, FILM COATED ORAL ONCE
Status: COMPLETED | OUTPATIENT
Start: 2021-04-24 | End: 2021-04-24

## 2021-04-24 RX ADMIN — PREDNISONE 20 MG: 20 TABLET ORAL at 16:15

## 2021-04-24 RX ADMIN — FAMOTIDINE 20 MG: 20 TABLET, FILM COATED ORAL at 16:15

## 2021-04-24 ASSESSMENT — FIBROSIS 4 INDEX: FIB4 SCORE: 0.38

## 2021-04-24 NOTE — ED TRIAGE NOTES
"Chief Complaint   Patient presents with   • Allergic Reaction     has multiple environmental allergies, today was in the store and started feeling itchy, by the time she got to the car she noticed red rash across her face, went to the  that was close to where she was, they gave her 0.5 epi IM. they noted that she passed out and had \"seizure like activity\" had accessory muscle use and desated to 78%       Pt brought in by EMS for above. EMS placed PIV and gave 25 benadryl    /79   Pulse 79   Temp 36.6 °C (97.9 °F) (Temporal)   Resp 15   Ht 1.702 m (5' 7\")   Wt 79.8 kg (176 lb)   SpO2 98%   BMI 27.57 kg/m²   "
In order to meet Medicare requirements, the clinical documentation must support the information cited in the admission order.  Please be sure to provide detailed and clear documentation about the following in the admitting note/history and physical:

## 2021-04-24 NOTE — PROGRESS NOTES
Patient is a 25-year-old female who presented to the urgent care with chief complaint of itching all over and shortness of breath.  She states she was in the grocery store nearby and felt poorly somewhat suddenly.  She had a sensation that her tongue was tingling and swollen.  While she was checking in at the , she had a syncopal episode while standing.  Myself and Rebeca MCDERMOTT were called to the front to evaluate.  911 was called at the same time.  Patient able to converse normally and stated that she felt lightheaded, short of breath, and her throat was increasing to feel more swollen.  Heart rate ranging  and oxygen saturation >95%.  Glucose 77, /90 Right, 142/90 Left. During evaluation, patient had episode of seizure like activity which lasted 5 to 10 seconds in which her eyes closed, eyelids fluttered, and respiratory rate slowed.  Patient spontaneously recovered from this episode, however at that time she had more difficulties conversing.  Stated that her head felt foggy and having some troubles with word finding.  Continued to feel increasing throat swelling and difficulties breathing.  Her breathing appeared to become more labored and oxygen began to desaturate to 78% at the lowest.  Jaw thrust performed, 2 L oxygen started, and 0.5 mL of 1 mg/mL epinephrine was given IM in the right buttock by Rebeca MCDERMOTT.  Able to maintain oxygen saturations in the low 90s after approximately 1 minute desatting in the 80's.  [facility] arrived and report given to ambulance team.  At the time ambulance arrived, oxygen saturations back in the 90's and pt more awake.  Care transferred and will take to nearest hospital.

## 2021-04-25 NOTE — DISCHARGE INSTRUCTIONS
Return here at once if you feel you are having any recurrent or new or worsening symptoms.  Call your doctor first thing Monday morning and arrange office recheck during the week.

## 2021-04-25 NOTE — ED PROVIDER NOTES
"ED Provider Note    CHIEF COMPLAINT  Chief Complaint   Patient presents with   • Allergic Reaction     has multiple environmental allergies, today was in the store and started feeling itchy, by the time she got to the car she noticed red rash across her face, went to the  that was close to where she was, they gave her 0.5 epi IM. they noted that she passed out and had \"seizure like activity\" had accessory muscle use and desated to 78%       HPI  Shy Buchanan is a 25 y.o. female who presents to the emergency department brought in by ambulance from urgent care.  The patient tells me that she went into a store to do some shopping today and suddenly developed itchy skin and red welts developed on her face, the symptoms were highly concerning and she went to an urgent care where she was given an intramuscular injection of epinephrine for probable allergic reaction.  She then had some convulsive activity possibly seizure and was sent to the emergency department for further evaluation.  By the time she arrives she has fully recovered from all of the above.  The patient tells me that she has no history of allergic reactions like this but she does suffer from environmental allergies.  She does have a seizure disorder which was diagnosed in December and she takes lamotrigine but that is not a new medication and there have been no new meds or changes recently.  She feels she is now completely recovered from the events    REVIEW OF SYSTEMS no chest pain no hemoptysis, no dyspnea at this time, no personal history of DVT or pulmonary emboli.  Her tongue and throat do not feel swollen at this time.  All other systems negative    PAST MEDICAL HISTORY  Past Medical History:   Diagnosis Date   • Healthy adult    • Hypertension    • Lupus (HCC)        FAMILY HISTORY  Family History   Problem Relation Age of Onset   • Lung Disease Mother    • Hypertension Mother    • Lung Disease Father    • Lung Disease Sister    • Hypertension " Sister        SOCIAL HISTORY  Social History     Socioeconomic History   • Marital status: Single     Spouse name: Not on file   • Number of children: Not on file   • Years of education: Not on file   • Highest education level: Not on file   Occupational History   • Not on file   Tobacco Use   • Smoking status: Never Smoker   • Smokeless tobacco: Never Used   Substance and Sexual Activity   • Alcohol use: Yes     Comment: occ   • Drug use: Yes     Types: Marijuana   • Sexual activity: Yes     Partners: Male   Other Topics Concern   • Not on file   Social History Narrative   • Not on file     Social Determinants of Health     Financial Resource Strain:    • Difficulty of Paying Living Expenses:    Food Insecurity:    • Worried About Running Out of Food in the Last Year:    • Ran Out of Food in the Last Year:    Transportation Needs:    • Lack of Transportation (Medical):    • Lack of Transportation (Non-Medical):    Physical Activity: Sufficiently Active   • Days of Exercise per Week: 4 days   • Minutes of Exercise per Session: 70 min   Stress: Stress Concern Present   • Feeling of Stress : Very much   Social Connections:    • Frequency of Communication with Friends and Family:    • Frequency of Social Gatherings with Friends and Family:    • Attends [employer] Services:    • Active Member of Clubs or Organizations:    • Attends Club or Organization Meetings:    • Marital Status:    Intimate Partner Violence:    • Fear of Current or Ex-Partner:    • Emotionally Abused:    • Physically Abused:    • Sexually Abused:        SURGICAL HISTORY  Past Surgical History:   Procedure Laterality Date   • APPENDECTOMY         CURRENT MEDICATIONS  Home Medications    **Home medications have not yet been reviewed for this encounter**         ALLERGIES  Allergies   Allergen Reactions   • Hydrocodone-Acetaminophen Nausea   • Vicodin [Hydrocodone-Acetaminophen] Vomiting   • Latex Hives, Itching and Rash   • Lidocaine        PHYSICAL  "EXAM  VITAL SIGNS: /94   Pulse 88   Temp 36.6 °C (97.9 °F) (Temporal)   Resp (!) 85   Ht 1.702 m (5' 7\")   Wt 79.8 kg (176 lb)   SpO2 96%   BMI 27.57 kg/m²    Oxygen saturation is interpreted as adequate  Constitutional: The patient is awake verbal very pleasant individual she does not appear toxic or distressed  HENT: I do not see any swelling or urticaria or erythema of the face and the tongue does not appear swollen and I do not see any evidence of tongue biting.  The patient is able to speak with a clear voice and no difficulty there is no stridor  Eyes: No erythema discharge or jaundice  Neck: Trachea midline no JVD  Cardiovascular: Regular rate and rhythm  Lungs: Clear and equal bilaterally with no apparent difficulty breathing  Abdomen/Back: Soft nontender nondistended  Skin: Warm and dry I do not see any other rash or urticaria  Musculoskeletal: The patient is tender over the mid right thigh where she received the epinephrine injection but I will see any redness or feel any induration in the area.    Neurologic: Awake lucid verbal moving all extremities with no difficulty    MEDICAL DECISION MAKING and DISPOSITION  Prior to arrival the patient received epinephrine and Benadryl so here in the ER she was given oral prednisone and Pepcid.  The patient was observed for period of time and I have reevaluated her she appears well I do not see any evidence of recurrent allergy and there is been no recurrent convulsive activity.  I think it is likely the patient had some kind of allergic reaction given her description and then she either had syncope with convulsive activity or she had a seizure but at this point in time she seems to have recovered from all of this.  I think will be safe for her to go home I am going to start her on a 5-day course of prednisone and I have advised her that if she has any recurrent, new or any worsening of any symptoms she is to return here immediately for recheck and she " is to call her primary care doctor Monday morning and arrange office recheck during the week and she is to talk to her doctor about whether or not she needs to be referred to an allergist for further testing.    IMPRESSION  1.  Allergic reaction  2.  Seizure activity         Electronically signed by: Richard Pace M.D., 4/24/2021 5:29 PM

## 2021-05-17 ENCOUNTER — OFFICE VISIT (OUTPATIENT)
Dept: MEDICAL GROUP | Facility: LAB | Age: 26
End: 2021-05-17

## 2021-05-17 ENCOUNTER — HOSPITAL ENCOUNTER (OUTPATIENT)
Dept: LAB | Facility: MEDICAL CENTER | Age: 26
End: 2021-05-17
Attending: PHYSICIAN ASSISTANT
Payer: COMMERCIAL

## 2021-05-17 VITALS
RESPIRATION RATE: 18 BRPM | OXYGEN SATURATION: 96 % | BODY MASS INDEX: 26.14 KG/M2 | HEART RATE: 78 BPM | HEIGHT: 70 IN | SYSTOLIC BLOOD PRESSURE: 114 MMHG | WEIGHT: 182.6 LBS | TEMPERATURE: 99.4 F | DIASTOLIC BLOOD PRESSURE: 82 MMHG

## 2021-05-17 DIAGNOSIS — R10.9 ABDOMINAL CRAMPING: ICD-10-CM

## 2021-05-17 DIAGNOSIS — R10.84 GENERALIZED ABDOMINAL PAIN: ICD-10-CM

## 2021-05-17 DIAGNOSIS — R11.2 NAUSEA AND VOMITING, INTRACTABILITY OF VOMITING NOT SPECIFIED, UNSPECIFIED VOMITING TYPE: ICD-10-CM

## 2021-05-17 LAB
APPEARANCE UR: CLEAR
BILIRUB UR STRIP-MCNC: NORMAL MG/DL
COLOR UR AUTO: YELLOW
GLUCOSE UR STRIP.AUTO-MCNC: NEGATIVE MG/DL
INT CON NEG: NEGATIVE
INT CON POS: POSITIVE
KETONES UR STRIP.AUTO-MCNC: NORMAL MG/DL
LEUKOCYTE ESTERASE UR QL STRIP.AUTO: NEGATIVE
LIPASE SERPL-CCNC: 21 U/L (ref 11–82)
NITRITE UR QL STRIP.AUTO: NEGATIVE
PH UR STRIP.AUTO: 5.5 [PH] (ref 5–8)
POC URINE PREGNANCY TEST: NEGATIVE
PROT UR QL STRIP: NEGATIVE MG/DL
RBC UR QL AUTO: NEGATIVE
SP GR UR STRIP.AUTO: 1.03
UROBILINOGEN UR STRIP-MCNC: 0.2 MG/DL

## 2021-05-17 PROCEDURE — 81025 URINE PREGNANCY TEST: CPT | Performed by: PHYSICIAN ASSISTANT

## 2021-05-17 PROCEDURE — 36415 COLL VENOUS BLD VENIPUNCTURE: CPT

## 2021-05-17 PROCEDURE — 81002 URINALYSIS NONAUTO W/O SCOPE: CPT | Performed by: PHYSICIAN ASSISTANT

## 2021-05-17 PROCEDURE — 83013 H PYLORI (C-13) BREATH: CPT

## 2021-05-17 PROCEDURE — 99214 OFFICE O/P EST MOD 30 MIN: CPT | Performed by: PHYSICIAN ASSISTANT

## 2021-05-17 PROCEDURE — 83690 ASSAY OF LIPASE: CPT

## 2021-05-17 RX ORDER — DICYCLOMINE HYDROCHLORIDE 10 MG/1
10 CAPSULE ORAL 3 TIMES DAILY PRN
Qty: 60 CAPSULE | Refills: 0 | Status: SHIPPED
Start: 2021-05-17 | End: 2021-07-05

## 2021-05-17 ASSESSMENT — FIBROSIS 4 INDEX: FIB4 SCORE: 0.38

## 2021-05-17 NOTE — PROGRESS NOTES
Subjective:   Shy Buchanan is a 25 y.o. female here today for abdominal pain x2 weeks.     Generalized abdominal pain  New to me;   New upper abdominal pain and lower abdominal pain x2 weeks - worse when eating. Pain can be sharp shooting pain and reports that it goes to her back.   Pain can also be dull in nature.   Reports that increased in abdominal pain has been since going to the ED in 04/2021 after having seizures.     Diet is pretty much Pescetarian-  lower carb, increasing healthy fats, no other changes to her diet.     Having regular bowel movements, looser than normal, denies blood in the stool  Also admits to having increased nausea - has had episodes of vomiting - due to increased pain.   Trying to stay well hydrated.   Had episode of urinary incontinence. Denies dysuria. No hematuria.     Menses are normal.            Current medicines (including changes today)  Current Outpatient Medications   Medication Sig Dispense Refill   • dicyclomine (BENTYL) 10 MG Cap Take 1 capsule by mouth 3 times a day as needed. 60 capsule 0   • nortriptyline (PAMELOR) 10 MG Cap Take 10 mg by mouth.     • Crisaborole 2 % Ointment Apply a thin layer to affected area twice per day for 14 days. 100 g 1   • mometasone (ELOCON) 0.1 % Cream Apply to affected area BID x14 days. 50 g 1   • albuterol 108 (90 Base) MCG/ACT Aero Soln inhalation aerosol Inhale 2 Puffs every 6 hours as needed for Shortness of Breath. 8.5 g 2   • montelukast (SINGULAIR) 10 MG Tab Take 1 tablet by mouth every day for 90 days. 90 tablet 0   • lamoTRIgine (LAMICTAL) 100 MG Tab Take 1 Tab by mouth 2 times a day. 60 Tab 3   • folic acid (FOLVITE) 1 MG Tab Take 2 Tabs by mouth every day. 60 Tab 11   • Azelastine HCl 137 MCG/SPRAY Solution      • fluticasone (FLONASE) 50 MCG/ACT nasal spray      • ibuprofen (MOTRIN) 800 MG Tab Take 1 Tab by mouth every 8 hours as needed for Moderate Pain. 90 Tab 0   • ondansetron (ZOFRAN ODT) 4 MG TABLET DISPERSIBLE Take 1  "Tab by mouth every 8 hours as needed for Nausea. 10 Tab 0     No current facility-administered medications for this visit.     She  has a past medical history of Healthy adult, Hypertension, and Lupus (HCC). She also has no past medical history of Asthma or Hyperlipidemia.    ROS   No chest pain, no shortness of breath,   +abdominal pain  As per HPI       Objective:     /82 (BP Location: Left arm, Patient Position: Sitting, BP Cuff Size: Adult)   Pulse 78   Temp 37.4 °C (99.4 °F) (Temporal)   Resp 18   Ht 1.77 m (5' 9.69\")   Wt 82.8 kg (182 lb 9.6 oz)   SpO2 96%  Body mass index is 26.43 kg/m².   Physical Exam:  Constitutional: Alert, no distress, though appears uncomfortable.   Skin: Warm, dry, good turgor, no rashes in visible areas.  Eye: Equal, round, conjunctiva clear, lids normal.  Neck: Trachea midline,   Respiratory: Unlabored respiratory effort  Abdomen: Soft, Moderate TTP in the epigastric region, LLQ, suprapubic region and RLQ. No masses, no hepatosplenomegaly.  Psych: Alert and oriented x3, normal affect and mood.        Assessment and Plan:   The following treatment plan was discussed    1. Generalized abdominal pain  New to me; worsening abdominal pain x2 weeks.   Urinalysis and pregnancy test was negative in clinic.   Pt had abdominal US in 11/2020 that was normal.   Will send for CT abdomen and pelvis at this time given worsening symptoms, especially episode of urinary incontinence.   She does have f/u scheduled with GI - though not until 07/2021.   Rx written for dicyclomine as written. Pt was educated on use and SEs of medication.  Continue to monitor  ED precautions if symptoms acutely worsen.   - POCT Urinalysis  - POCT Pregnancy  - H. PYLORI BREATH TEST  - LIPASE; Future  - CT-ABDOMEN-PELVIS W/O; Future  - dicyclomine (BENTYL) 10 MG Cap; Take 1 capsule by mouth 3 times a day as needed.  Dispense: 60 capsule; Refill: 0    2. Nausea and vomiting, intractability of vomiting not " specified, unspecified vomiting type  See above.   - CT-ABDOMEN-PELVIS W/O; Future  - dicyclomine (BENTYL) 10 MG Cap; Take 1 capsule by mouth 3 times a day as needed.  Dispense: 60 capsule; Refill: 0    3. Abdominal cramping  See above.   - dicyclomine (BENTYL) 10 MG Cap; Take 1 capsule by mouth 3 times a day as needed.  Dispense: 60 capsule; Refill: 0      Followup: Return if symptoms worsen or fail to improve.       Nohemy Cooper P.A.-C.  Supervising MD: Dr. Warner Dolan MD  05/17/21

## 2021-05-17 NOTE — ASSESSMENT & PLAN NOTE
New to me;   New upper abdominal pain and lower abdominal pain x2 weeks - worse when eating. Pain can be sharp shooting pain and reports that it goes to her back.   Pain can also be dull in nature.   Reports that increased in abdominal pain has been since going to the ED in 04/2021 after having seizures.     Diet is pretty much Pescetarian-  lower carb, increasing healthy fats, no other changes to her diet.     Having regular bowel movements, looser than normal, denies blood in the stool  Also admits to having increased nausea - has had episodes of vomiting - due to increased pain.   Trying to stay well hydrated.   Had episode of urinary incontinence. Denies dysuria. No hematuria.     Menses are normal.

## 2021-05-18 DIAGNOSIS — R11.2 NAUSEA AND VOMITING, INTRACTABILITY OF VOMITING NOT SPECIFIED, UNSPECIFIED VOMITING TYPE: ICD-10-CM

## 2021-05-18 LAB — UREA BREATH TEST QL: NEGATIVE

## 2021-05-18 RX ORDER — ONDANSETRON 4 MG/1
4 TABLET, FILM COATED ORAL EVERY 4 HOURS PRN
Qty: 20 TABLET | Refills: 1 | Status: SHIPPED | OUTPATIENT
Start: 2021-05-18 | End: 2021-05-28

## 2021-05-18 NOTE — PROGRESS NOTES
1. Nausea and vomiting, intractability of vomiting not specified, unspecified vomiting type  ondansetron (ZOFRAN) 4 MG Tab tablet     Nohemy Cooper P.A.-C.

## 2021-05-25 ENCOUNTER — TELEPHONE (OUTPATIENT)
Dept: MEDICAL GROUP | Facility: LAB | Age: 26
End: 2021-05-25

## 2021-05-25 NOTE — TELEPHONE ENCOUNTER
Pt's ins requires a peer to peer review for the CT that was ordered.  Her appt is tomorrow, 5/26/2021 at 6 pm.    [facility] [phone], follow the prompts for Juan CALOV.  Pt's ID # [id number]  May in [employer] would need to know by today if you wish to pursue the authorization.  Thanks!

## 2021-05-26 ENCOUNTER — HOSPITAL ENCOUNTER (OUTPATIENT)
Dept: RADIOLOGY | Facility: MEDICAL CENTER | Age: 26
End: 2021-05-26
Attending: PHYSICIAN ASSISTANT
Payer: COMMERCIAL

## 2021-05-26 DIAGNOSIS — R10.84 GENERALIZED ABDOMINAL PAIN: ICD-10-CM

## 2021-05-26 DIAGNOSIS — R11.2 NAUSEA AND VOMITING, INTRACTABILITY OF VOMITING NOT SPECIFIED, UNSPECIFIED VOMITING TYPE: ICD-10-CM

## 2021-05-27 NOTE — PROGRESS NOTES
Exam cancelled due to pt. Itching after oral contrast given. Radiologist notified. Pt. Given choice to go to Emergency room for further investigation. No welts or hives noted where she was scratching.

## 2021-06-04 DIAGNOSIS — R10.84 GENERALIZED ABDOMINAL PAIN: ICD-10-CM

## 2021-06-04 DIAGNOSIS — R11.2 NAUSEA AND VOMITING, INTRACTABILITY OF VOMITING NOT SPECIFIED, UNSPECIFIED VOMITING TYPE: ICD-10-CM

## 2021-06-04 NOTE — PROGRESS NOTES
1. Nausea and vomiting, intractability of vomiting not specified, unspecified vomiting type  CT-ABDOMEN-PELVIS W/O   2. Generalized abdominal pain  CT-ABDOMEN-PELVIS W/O     Nohemy Cooper P.A.-C.

## 2021-06-14 ENCOUNTER — HOSPITAL ENCOUNTER (OUTPATIENT)
Dept: RADIOLOGY | Facility: MEDICAL CENTER | Age: 26
End: 2021-06-14
Attending: PHYSICIAN ASSISTANT
Payer: COMMERCIAL

## 2021-06-14 DIAGNOSIS — R11.2 NAUSEA AND VOMITING, INTRACTABILITY OF VOMITING NOT SPECIFIED, UNSPECIFIED VOMITING TYPE: ICD-10-CM

## 2021-06-14 DIAGNOSIS — R10.84 GENERALIZED ABDOMINAL PAIN: ICD-10-CM

## 2021-06-14 PROCEDURE — 74176 CT ABD & PELVIS W/O CONTRAST: CPT

## 2021-06-14 PROCEDURE — 700117 HCHG RX CONTRAST REV CODE 255: Performed by: PHYSICIAN ASSISTANT

## 2021-06-14 RX ADMIN — IOHEXOL 100 ML: 350 INJECTION, SOLUTION INTRAVENOUS at 12:19

## 2021-07-02 DIAGNOSIS — T78.40XS ALLERGY, SEQUELA: ICD-10-CM

## 2021-07-02 DIAGNOSIS — L20.84 INTRINSIC ECZEMA: ICD-10-CM

## 2021-07-02 NOTE — TELEPHONE ENCOUNTER
Received request via: Pharmacy    Was the patient seen in the last year in this department? Yes  LOV:5/17/2021  Does the patient have an active prescription (recently filled or refills available) for medication(s) requested? Yes. Refill request has been refused in Epic. Contacted pharmacy and called in most recent prescription.

## 2021-07-05 ENCOUNTER — HOSPITAL ENCOUNTER (EMERGENCY)
Facility: MEDICAL CENTER | Age: 26
End: 2021-07-05
Attending: EMERGENCY MEDICINE
Payer: COMMERCIAL

## 2021-07-05 ENCOUNTER — APPOINTMENT (OUTPATIENT)
Dept: RADIOLOGY | Facility: MEDICAL CENTER | Age: 26
End: 2021-07-05
Attending: EMERGENCY MEDICINE
Payer: COMMERCIAL

## 2021-07-05 VITALS
HEIGHT: 67 IN | HEART RATE: 88 BPM | BODY MASS INDEX: 27.89 KG/M2 | SYSTOLIC BLOOD PRESSURE: 121 MMHG | OXYGEN SATURATION: 97 % | WEIGHT: 177.69 LBS | RESPIRATION RATE: 18 BRPM | TEMPERATURE: 98.6 F | DIASTOLIC BLOOD PRESSURE: 74 MMHG

## 2021-07-05 DIAGNOSIS — N76.0 BACTERIAL VAGINOSIS: ICD-10-CM

## 2021-07-05 DIAGNOSIS — B96.89 BACTERIAL VAGINOSIS: ICD-10-CM

## 2021-07-05 DIAGNOSIS — R10.2 PELVIC PAIN: ICD-10-CM

## 2021-07-05 LAB
ALBUMIN SERPL BCP-MCNC: 4.2 G/DL (ref 3.2–4.9)
ALBUMIN/GLOB SERPL: 1.4 G/DL
ALP SERPL-CCNC: 59 U/L (ref 30–99)
ALT SERPL-CCNC: 15 U/L (ref 2–50)
ANION GAP SERPL CALC-SCNC: 10 MMOL/L (ref 7–16)
APPEARANCE UR: CLEAR
AST SERPL-CCNC: 23 U/L (ref 12–45)
BACTERIA #/AREA URNS HPF: NEGATIVE /HPF
BACTERIA GENITAL QL WET PREP: NORMAL
BASOPHILS # BLD AUTO: 0.6 % (ref 0–1.8)
BASOPHILS # BLD: 0.06 K/UL (ref 0–0.12)
BILIRUB SERPL-MCNC: 1.1 MG/DL (ref 0.1–1.5)
BILIRUB UR QL STRIP.AUTO: ABNORMAL
BUN SERPL-MCNC: 7 MG/DL (ref 8–22)
CALCIUM SERPL-MCNC: 9.4 MG/DL (ref 8.4–10.2)
CHLORIDE SERPL-SCNC: 104 MMOL/L (ref 96–112)
CO2 SERPL-SCNC: 23 MMOL/L (ref 20–33)
COLOR UR: YELLOW
CREAT SERPL-MCNC: 0.78 MG/DL (ref 0.5–1.4)
EOSINOPHIL # BLD AUTO: 0.28 K/UL (ref 0–0.51)
EOSINOPHIL NFR BLD: 2.8 % (ref 0–6.9)
EPI CELLS #/AREA URNS HPF: NEGATIVE /HPF
ERYTHROCYTE [DISTWIDTH] IN BLOOD BY AUTOMATED COUNT: 40.1 FL (ref 35.9–50)
GLOBULIN SER CALC-MCNC: 2.9 G/DL (ref 1.9–3.5)
GLUCOSE SERPL-MCNC: 80 MG/DL (ref 65–99)
GLUCOSE UR STRIP.AUTO-MCNC: NEGATIVE MG/DL
HCG SERPL QL: NEGATIVE
HCT VFR BLD AUTO: 40.4 % (ref 37–47)
HGB BLD-MCNC: 13.3 G/DL (ref 12–16)
HYALINE CASTS #/AREA URNS LPF: NORMAL /LPF
IMM GRANULOCYTES # BLD AUTO: 0.04 K/UL (ref 0–0.11)
IMM GRANULOCYTES NFR BLD AUTO: 0.4 % (ref 0–0.9)
KETONES UR STRIP.AUTO-MCNC: 80 MG/DL
LEUKOCYTE ESTERASE UR QL STRIP.AUTO: NEGATIVE
LYMPHOCYTES # BLD AUTO: 2.06 K/UL (ref 1–4.8)
LYMPHOCYTES NFR BLD: 20.5 % (ref 22–41)
MCH RBC QN AUTO: 28.1 PG (ref 27–33)
MCHC RBC AUTO-ENTMCNC: 32.9 G/DL (ref 33.6–35)
MCV RBC AUTO: 85.4 FL (ref 81.4–97.8)
MICRO URNS: ABNORMAL
MONOCYTES # BLD AUTO: 0.81 K/UL (ref 0–0.85)
MONOCYTES NFR BLD AUTO: 8.1 % (ref 0–13.4)
NEUTROPHILS # BLD AUTO: 6.8 K/UL (ref 2–7.15)
NEUTROPHILS NFR BLD: 67.6 % (ref 44–72)
NITRITE UR QL STRIP.AUTO: NEGATIVE
NRBC # BLD AUTO: 0 K/UL
NRBC BLD-RTO: 0 /100 WBC
PH UR STRIP.AUTO: 5 [PH] (ref 5–8)
PLATELET # BLD AUTO: 237 K/UL (ref 164–446)
PMV BLD AUTO: 9.2 FL (ref 9–12.9)
POTASSIUM SERPL-SCNC: 4.3 MMOL/L (ref 3.6–5.5)
PROT SERPL-MCNC: 7.1 G/DL (ref 6–8.2)
PROT UR QL STRIP: NEGATIVE MG/DL
RBC # BLD AUTO: 4.73 M/UL (ref 4.2–5.4)
RBC # URNS HPF: NORMAL /HPF
RBC UR QL AUTO: ABNORMAL
SIGNIFICANT IND 70042: NORMAL
SITE SITE: NORMAL
SODIUM SERPL-SCNC: 137 MMOL/L (ref 135–145)
SOURCE SOURCE: NORMAL
SP GR UR STRIP.AUTO: >=1.03
WBC # BLD AUTO: 10.1 K/UL (ref 4.8–10.8)
WBC #/AREA URNS HPF: NORMAL /HPF

## 2021-07-05 PROCEDURE — 76856 US EXAM PELVIC COMPLETE: CPT

## 2021-07-05 PROCEDURE — 80053 COMPREHEN METABOLIC PANEL: CPT

## 2021-07-05 PROCEDURE — A9270 NON-COVERED ITEM OR SERVICE: HCPCS | Performed by: EMERGENCY MEDICINE

## 2021-07-05 PROCEDURE — 87591 N.GONORRHOEAE DNA AMP PROB: CPT

## 2021-07-05 PROCEDURE — 87491 CHLMYD TRACH DNA AMP PROBE: CPT

## 2021-07-05 PROCEDURE — 99284 EMERGENCY DEPT VISIT MOD MDM: CPT

## 2021-07-05 PROCEDURE — 84703 CHORIONIC GONADOTROPIN ASSAY: CPT

## 2021-07-05 PROCEDURE — 81001 URINALYSIS AUTO W/SCOPE: CPT

## 2021-07-05 PROCEDURE — 36415 COLL VENOUS BLD VENIPUNCTURE: CPT

## 2021-07-05 PROCEDURE — 700102 HCHG RX REV CODE 250 W/ 637 OVERRIDE(OP): Performed by: EMERGENCY MEDICINE

## 2021-07-05 PROCEDURE — 85025 COMPLETE CBC W/AUTO DIFF WBC: CPT

## 2021-07-05 RX ORDER — OXYCODONE HYDROCHLORIDE 5 MG/1
5 TABLET ORAL ONCE
Status: COMPLETED | OUTPATIENT
Start: 2021-07-05 | End: 2021-07-05

## 2021-07-05 RX ORDER — METRONIDAZOLE 500 MG/1
500 TABLET ORAL 2 TIMES DAILY
Qty: 14 TABLET | Refills: 0 | Status: SHIPPED | OUTPATIENT
Start: 2021-07-05 | End: 2021-07-12

## 2021-07-05 RX ORDER — MONTELUKAST SODIUM 10 MG/1
TABLET ORAL
Qty: 90 TABLET | Refills: 0 | Status: SHIPPED | OUTPATIENT
Start: 2021-07-05 | End: 2021-07-05

## 2021-07-05 RX ORDER — DICYCLOMINE HYDROCHLORIDE 10 MG/1
10 CAPSULE ORAL
Status: SHIPPED | COMMUNITY
End: 2023-01-18

## 2021-07-05 RX ORDER — MOMETASONE FUROATE 1 MG/G
1 CREAM TOPICAL PRN
Status: SHIPPED | COMMUNITY
End: 2023-12-12

## 2021-07-05 RX ORDER — MONTELUKAST SODIUM 10 MG/1
10 TABLET ORAL DAILY
Status: SHIPPED | COMMUNITY
End: 2023-03-03 | Stop reason: SDUPTHER

## 2021-07-05 RX ADMIN — OXYCODONE HYDROCHLORIDE 5 MG: 5 TABLET ORAL at 16:36

## 2021-07-05 ASSESSMENT — FIBROSIS 4 INDEX: FIB4 SCORE: 0.38

## 2021-07-05 NOTE — ED PROVIDER NOTES
ED Provider Note    CHIEF COMPLAINT  Chief Complaint   Patient presents with   • Pelvic Pain     Reports pelvic pain x approx 2 months, states IUD placed approx 2 weeks ago. Reports as well vaginal spotting. Denies known fevers. Does endorse some nausea, but denies vomiting or diarrhea. Denies known pregnancy.        HPI  Shy Buchanan is a 25 y.o. female who presents to emergency room with chronic pelvic pain. She states that she is currently being workup by [facility] Dr. Corbin  For her chronic pelvic pain. At this point the question whether not she may have a component of endometriosis. She was previously had negative workup's for acute causation otherwise. Due to increasing pain now especially after IUD placed approximately two weeks ago she felt that she needed to get further evaluation tonight. Pain is mild to moderate. No relief with over-the-counter medications at home. Notable aggravated leaving factors other than painful sex and slight pain with defecation.    REVIEW OF SYSTEMS  See HPI for further details. All other systems are negative.     PAST MEDICAL HISTORY   has a past medical history of Epilepsy ([facility]), Healthy adult, Hypertension, and Lupus ([facility]).    SOCIAL HISTORY  Social History     Tobacco Use   • Smoking status: Never Smoker   • Smokeless tobacco: Never Used   Vaping Use   • Vaping Use: Never used   Substance and Sexual Activity   • Alcohol use: Yes     Comment: occ   • Drug use: Yes     Types: Marijuana   • Sexual activity: Yes     Partners: Male       SURGICAL HISTORY   has a past surgical history that includes appendectomy and gyn surgery.    CURRENT MEDICATIONS  Home Medications     Reviewed by Indio Goncalves (Pharmacy Tech) on 07/05/21 at 1437  Med List Status: Complete   Medication Last Dose Status   Azelastine HCl 137 MCG/SPRAY Solution 7/4/2021 Active   dicyclomine (BENTYL) 10 MG Cap 7/5/2021 Active   fluticasone (FLONASE) 50 MCG/ACT nasal spray 7/4/2021 Active   folic  "acid (FOLVITE) 1 MG Tab 7/5/2021 Active   ibuprofen (MOTRIN) 800 MG Tab 7/5/2021 Active   lamoTRIgine (LAMICTAL) 100 MG Tab 7/5/2021 Active   mometasone (ELOCON) 0.1 % Cream 7/5/2021 Active   montelukast (SINGULAIR) 10 MG Tab 7/5/2021 Active   nortriptyline (PAMELOR) 10 MG Cap 7/4/2021 Active   ondansetron (ZOFRAN ODT) 4 MG TABLET DISPERSIBLE 7/5/2021 Active                ALLERGIES  Allergies   Allergen Reactions   • Hydrocodone-Acetaminophen Nausea   • Vicodin [Hydrocodone-Acetaminophen] Vomiting   • Contrast Media With Iodine [Iodine] Hives and Itching   • Latex Hives, Itching and Rash   • Lidocaine Hives       PHYSICAL EXAM  VITAL SIGNS: /74   Pulse 88   Temp 37 °C (98.6 °F)   Resp 18   Ht 1.702 m (5' 7\")   Wt 80.6 kg (177 lb 11.1 oz)   SpO2 97%   BMI 27.83 kg/m²  @ROLY[[id number]::@   Pulse ox interpretation: I interpret this pulse ox as normal.  Constitutional: Alert in no apparent distress.  HENT: No signs of trauma, Bilateral external ears normal, Nose normal.   Eyes: Pupils are equal and reactive  Neck: Normal range of motion, No tenderness, Supple  Cardiovascular: Regular rate and rhythm, no murmurs.   Thorax & Lungs: Normal breath sounds, No respiratory distress, No wheezing, No chest tenderness.   Abdomen: Bowel sounds normal, Soft, No tenderness  Pelvic: IUD strings visualized. Dark brown d/c in vaginal canal  Skin: Warm, Dry, No erythema, No rash.   Extremities: Intact distal pulses  Musculoskeletal: Good range of motion in all major joints. No tenderness to palpation or major deformities noted.   Neurologic: Alert , Normal motor function, Normal sensory function, No focal deficits noted.   Psychiatric: Affect normal, Judgment normal, Mood normal.       DIAGNOSTIC STUDIES / PROCEDURES      LABS  Results for orders placed or performed during the hospital encounter of 07/05/21   CBC WITH DIFFERENTIAL   Result Value Ref Range    WBC 10.1 4.8 - 10.8 K/uL    RBC 4.73 4.20 - 5.40 M/uL    Hemoglobin " 13.3 12.0 - 16.0 g/dL    Hematocrit 40.4 37.0 - 47.0 %    MCV 85.4 81.4 - 97.8 fL    MCH 28.1 27.0 - 33.0 pg    MCHC 32.9 (L) 33.6 - 35.0 g/dL    RDW 40.1 35.9 - 50.0 fL    Platelet Count 237 164 - 446 K/uL    MPV 9.2 9.0 - 12.9 fL    Neutrophils-Polys 67.60 44.00 - 72.00 %    Lymphocytes 20.50 (L) 22.00 - 41.00 %    Monocytes 8.10 0.00 - 13.40 %    Eosinophils 2.80 0.00 - 6.90 %    Basophils 0.60 0.00 - 1.80 %    Immature Granulocytes 0.40 0.00 - 0.90 %    Nucleated RBC 0.00 /100 WBC    Neutrophils (Absolute) 6.80 2.00 - 7.15 K/uL    Lymphs (Absolute) 2.06 1.00 - 4.80 K/uL    Monos (Absolute) 0.81 0.00 - 0.85 K/uL    Eos (Absolute) 0.28 0.00 - 0.51 K/uL    Baso (Absolute) 0.06 0.00 - 0.12 K/uL    Immature Granulocytes (abs) 0.04 0.00 - 0.11 K/uL    NRBC (Absolute) 0.00 K/uL   COMP METABOLIC PANEL   Result Value Ref Range    Sodium 137 135 - 145 mmol/L    Potassium 4.3 3.6 - 5.5 mmol/L    Chloride 104 96 - 112 mmol/L    Co2 23 20 - 33 mmol/L    Anion Gap 10.0 7.0 - 16.0    Glucose 80 65 - 99 mg/dL    Bun 7 (L) 8 - 22 mg/dL    Creatinine 0.78 0.50 - 1.40 mg/dL    Calcium 9.4 8.4 - 10.2 mg/dL    AST(SGOT) 23 12 - 45 U/L    ALT(SGPT) 15 2 - 50 U/L    Alkaline Phosphatase 59 30 - 99 U/L    Total Bilirubin 1.1 0.1 - 1.5 mg/dL    Albumin 4.2 3.2 - 4.9 g/dL    Total Protein 7.1 6.0 - 8.2 g/dL    Globulin 2.9 1.9 - 3.5 g/dL    A-G Ratio 1.4 g/dL   HCG QUAL SERUM   Result Value Ref Range    Beta-Hcg Qualitative Serum Negative Negative   ESTIMATED GFR   Result Value Ref Range    GFR If African American >60 >60 mL/min/1.73 m 2    GFR If Non African American >60 >60 mL/min/1.73 m 2   WET PREP    Specimen: Vaginal; Genital   Result Value Ref Range    Significant Indicator NEG     Source GEN     Site VAGINAL     Wet Prep For Parasites       Moderate clue cells seen.  No yeast.  No motile Trichomonas seen.  Rare WBCs seen.     CHLAMYDIA & GC BY PCR    Specimen: Genital   Result Value Ref Range    Source Vaginal    URINALYSIS    Result Value Ref Range    Color Yellow     Character Clear     Specific Gravity >=1.030 <1.035    Ph 5.0 5.0 - 8.0    Glucose Negative Negative mg/dL    Ketones 80 Negative mg/dL    Protein Negative Negative mg/dL    Bilirubin Small (A) Negative    Nitrite Negative Negative    Leukocyte Esterase Negative Negative    Occult Blood Trace (A) Negative    Micro Urine Req Microscopic    URINE MICROSCOPIC (W/UA)   Result Value Ref Range    WBC 0-2 /hpf    RBC 0-2 /hpf    Bacteria Negative None /hpf    Epithelial Cells Negative Few /hpf    Hyaline Cast 0-2 /lpf         RADIOLOGY  US-PELVIC COMPLETE (TRANSABDOMINAL/TRANSVAGINAL) (COMBO)   Final Result      1.  Small amount of nonspecific free pelvic fluid.      2.  Intrauterine device appears appropriately positioned            COURSE & MEDICAL DECISION MAKING  Pertinent Labs & Imaging studies reviewed. (See chart for details)  25-year-old female presented to the emergency department with the above complaint. Acute on chronic pelvic pain. Workup is largely benign other than the finding of bacterial vaginosis on today's wet prep. She will be treated for this. She will otherwise be referred back to her OB/GYN for further workup and possible need for further evaluation of endometriosis which is remained in the differential thus far in our conversation. In the interim she is understanding return precautions here the ER if needed.       The patient will return for worsening symptoms and is stable at the time of discharge. The patient verbalizes understanding and will comply.    FINAL IMPRESSION  1. Pelvic pain    2. Bacterial vaginosis            Electronically signed by: Eduardo Guerrero M.D., 7/5/2021 4:26 PM

## 2021-07-05 NOTE — ED NOTES
Med rec updated and complete  Allergies reviewed  Interviewed pt with boyfriend at bedside with permission from pt.  Pt reports no antibiotics in the last 30 days.      No current facility-administered medications on file prior to encounter.     Current Outpatient Medications on File Prior to Encounter   Medication Sig Dispense Refill   • dicyclomine (BENTYL) 10 MG Cap Take 10 mg by mouth 3 times a day with meals.     • mometasone (ELOCON) 0.1 % Cream Apply 1 g topically as needed (For rash on hip).     • montelukast (SINGULAIR) 10 MG Tab Take 10 mg by mouth every day.     • nortriptyline (PAMELOR) 10 MG Cap Take 10 mg by mouth at bedtime.     • lamoTRIgine (LAMICTAL) 100 MG Tab Take 1 Tab by mouth 2 times a day. 60 Tab 3   • folic acid (FOLVITE) 1 MG Tab Take 2 Tabs by mouth every day. 60 Tab 11   • Azelastine HCl 137 MCG/SPRAY Solution Administer 1 split tablet into affected nostril(S) at bedtime.     • fluticasone (FLONASE) 50 MCG/ACT nasal spray Administer 1 Spray into affected nostril(S) at bedtime.     • ibuprofen (MOTRIN) 800 MG Tab Take 1 Tab by mouth every 8 hours as needed for Moderate Pain. 90 Tab 0   • ondansetron (ZOFRAN ODT) 4 MG TABLET DISPERSIBLE Take 1 Tab by mouth every 8 hours as needed for Nausea. 10 Tab 0

## 2021-07-06 LAB
C TRACH DNA SPEC QL NAA+PROBE: NEGATIVE
N GONORRHOEA DNA SPEC QL NAA+PROBE: NEGATIVE
SPECIMEN SOURCE: NORMAL

## 2021-08-19 ENCOUNTER — PRE-ADMISSION TESTING (OUTPATIENT)
Dept: ADMISSIONS | Facility: MEDICAL CENTER | Age: 26
End: 2021-08-19
Attending: INTERNAL MEDICINE
Payer: COMMERCIAL

## 2021-08-19 NOTE — PREPROCEDURE INSTRUCTIONS
"Pre-admit appointment completed. \"Preparing for your Procedure\" instructions given to Pt via phone with verbal, emailed written instructions, along with video content. Pt states all instructions given are understood and to call pre-admit or Dr's office for additional questions or any symptoms of illness/covid develop prior to DOS. Medications the patient will take the morning of surgery per anesthesia protocol: Lamotrigine, Montelukast. Instructed to take other prescribed medications through the day before surgery.  "

## 2021-08-26 ENCOUNTER — HOSPITAL ENCOUNTER (OUTPATIENT)
Facility: MEDICAL CENTER | Age: 26
End: 2021-08-26
Attending: PHYSICIAN ASSISTANT
Payer: COMMERCIAL

## 2021-08-26 ENCOUNTER — NON-PROVIDER VISIT (OUTPATIENT)
Dept: MEDICAL GROUP | Facility: LAB | Age: 26
End: 2021-08-26
Payer: COMMERCIAL

## 2021-08-26 ENCOUNTER — PATIENT MESSAGE (OUTPATIENT)
Dept: MEDICAL GROUP | Facility: LAB | Age: 26
End: 2021-08-26

## 2021-08-26 DIAGNOSIS — R43.0 LOSS OF SMELL: ICD-10-CM

## 2021-08-26 PROCEDURE — U0005 INFEC AGEN DETEC AMPLI PROBE: HCPCS

## 2021-08-26 PROCEDURE — U0003 INFECTIOUS AGENT DETECTION BY NUCLEIC ACID (DNA OR RNA); SEVERE ACUTE RESPIRATORY SYNDROME CORONAVIRUS 2 (SARS-COV-2) (CORONAVIRUS DISEASE [COVID-19]), AMPLIFIED PROBE TECHNIQUE, MAKING USE OF HIGH THROUGHPUT TECHNOLOGIES AS DESCRIBED BY CMS-2020-01-R: HCPCS
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
## 2021-08-26 NOTE — NON-PROVIDER
This is a non-provider visit for a COVID test only.     3 patient identifiers verified.     Specimen collected. Patient tolerated well.     COVID post-test handout given. Educated to assume positive until results come back.     MARIE Gallegos

## 2021-08-27 DIAGNOSIS — Z11.59 SCREENING FOR VIRAL DISEASE: ICD-10-CM

## 2021-08-27 LAB
COVID ORDER STATUS COVID19: NORMAL
SARS-COV-2 RNA RESP QL NAA+PROBE: NOTDETECTED
SPECIMEN SOURCE: NORMAL

## 2021-09-08 ENCOUNTER — TELEMEDICINE (OUTPATIENT)
Dept: MEDICAL GROUP | Facility: LAB | Age: 26
End: 2021-09-08
Payer: COMMERCIAL

## 2021-09-08 VITALS — HEIGHT: 67 IN | BODY MASS INDEX: 27.47 KG/M2 | WEIGHT: 175 LBS

## 2021-09-08 DIAGNOSIS — J01.00 SUBACUTE MAXILLARY SINUSITIS: ICD-10-CM

## 2021-09-08 DIAGNOSIS — J45.20 MILD INTERMITTENT REACTIVE AIRWAY DISEASE WITHOUT COMPLICATION: ICD-10-CM

## 2021-09-08 PROCEDURE — 99214 OFFICE O/P EST MOD 30 MIN: CPT | Mod: 95 | Performed by: PHYSICIAN ASSISTANT

## 2021-09-08 RX ORDER — ALBUTEROL SULFATE 2.5 MG/3ML
2.5 SOLUTION RESPIRATORY (INHALATION) EVERY 4 HOURS PRN
Qty: 1 EACH | Refills: 2 | Status: SHIPPED | OUTPATIENT
Start: 2021-09-08 | End: 2021-11-06 | Stop reason: SDUPTHER

## 2021-09-08 RX ORDER — AMOXICILLIN AND CLAVULANATE POTASSIUM 875; 125 MG/1; MG/1
1 TABLET, FILM COATED ORAL 2 TIMES DAILY
Qty: 14 TABLET | Refills: 0 | Status: SHIPPED | OUTPATIENT
Start: 2021-09-08 | End: 2021-09-15

## 2021-09-08 ASSESSMENT — FIBROSIS 4 INDEX: FIB4 SCORE: 0.68
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
## 2021-11-06 DIAGNOSIS — J45.20 MILD INTERMITTENT REACTIVE AIRWAY DISEASE WITHOUT COMPLICATION: ICD-10-CM

## 2021-11-06 DIAGNOSIS — M54.2 CERVICALGIA: ICD-10-CM

## 2021-11-08 RX ORDER — IBUPROFEN 800 MG/1
800 TABLET ORAL EVERY 8 HOURS PRN
Qty: 90 TABLET | Refills: 0 | Status: SHIPPED | OUTPATIENT
Start: 2021-11-08

## 2021-11-08 RX ORDER — ALBUTEROL SULFATE 2.5 MG/3ML
2.5 SOLUTION RESPIRATORY (INHALATION) EVERY 4 HOURS PRN
Qty: 1 EACH | Refills: 2 | Status: SHIPPED | OUTPATIENT
Start: 2021-11-08 | End: 2022-01-04 | Stop reason: SDUPTHER

## 2021-11-08 NOTE — TELEPHONE ENCOUNTER
Received request via: Patient    Was the patient seen in the last year in this department? Yes  LOV 09/08/2021 - Telemedicine  Does the patient have an active prescription (recently filled or refills available) for medication(s) requested? No

## 2022-01-04 ENCOUNTER — OFFICE VISIT (OUTPATIENT)
Dept: MEDICAL GROUP | Facility: LAB | Age: 27
End: 2022-01-04
Payer: COMMERCIAL

## 2022-01-04 VITALS
HEIGHT: 67 IN | TEMPERATURE: 98.1 F | BODY MASS INDEX: 26.49 KG/M2 | SYSTOLIC BLOOD PRESSURE: 124 MMHG | HEART RATE: 89 BPM | WEIGHT: 168.8 LBS | OXYGEN SATURATION: 96 % | RESPIRATION RATE: 16 BRPM | DIASTOLIC BLOOD PRESSURE: 82 MMHG

## 2022-01-04 DIAGNOSIS — U07.1 COVID-19: Primary | ICD-10-CM

## 2022-01-04 DIAGNOSIS — J45.20 MILD INTERMITTENT REACTIVE AIRWAY DISEASE WITHOUT COMPLICATION: ICD-10-CM

## 2022-01-04 DIAGNOSIS — J34.89 SINUS PRESSURE: ICD-10-CM

## 2022-01-04 DIAGNOSIS — Z11.1 SCREENING FOR TUBERCULOSIS: ICD-10-CM

## 2022-01-04 LAB
EXTERNAL QUALITY CONTROL: NORMAL
SARS-COV+SARS-COV-2 AG RESP QL IA.RAPID: POSITIVE

## 2022-01-04 PROCEDURE — 99213 OFFICE O/P EST LOW 20 MIN: CPT | Mod: CS | Performed by: PHYSICIAN ASSISTANT

## 2022-01-04 PROCEDURE — 87426 SARSCOV CORONAVIRUS AG IA: CPT | Mod: CS | Performed by: PHYSICIAN ASSISTANT

## 2022-01-04 RX ORDER — ALBUTEROL SULFATE 2.5 MG/3ML
2.5 SOLUTION RESPIRATORY (INHALATION) EVERY 4 HOURS PRN
Qty: 1 EACH | Refills: 2 | Status: SHIPPED | OUTPATIENT
Start: 2022-01-04 | End: 2023-09-01

## 2022-01-04 ASSESSMENT — PATIENT HEALTH QUESTIONNAIRE - PHQ9: CLINICAL INTERPRETATION OF PHQ2 SCORE: 0

## 2022-01-04 ASSESSMENT — FIBROSIS 4 INDEX: FIB4 SCORE: 0.68

## 2022-01-04 NOTE — ASSESSMENT & PLAN NOTE
New to me; pt reports having sinus pain and pressure since 12/24/2021.   Reports that she has tested negative for COVID twice since then, however, she did recently just fly home from [place].   No fevers, though does admit to chills.   No abdominal pain, nausea or vomiting

## 2022-01-04 NOTE — PROGRESS NOTES
Subjective:   Shy Buchanan is a 26 y.o. female here today for since pain and pressure x10 days     Sinus pressure  New to me; pt reports having sinus pain and pressure since 12/24/2021.   Reports that she has tested negative for COVID twice since then, however, she did recently just fly home from [place].   No fevers, though does admit to chills.   No abdominal pain, nausea or vomiting         Current medicines (including changes today)  Current Outpatient Medications   Medication Sig Dispense Refill   • ibuprofen (MOTRIN) 800 MG Tab Take 1 Tablet by mouth every 8 hours as needed for Moderate Pain. 90 Tablet 0   • albuterol (PROVENTIL) 2.5mg/3ml Nebu Soln solution for nebulization Take 3 mL by nebulization every four hours as needed for Shortness of Breath. 1 Each 2   • folic acid (FOLVITE) 1 MG Tab Take 2 Tablets by mouth every day. 60 Tablet 11   • lamoTRIgine (LAMICTAL) 100 MG Tab Take 1 Tablet by mouth 2 times a day for 120 days. 60 Tablet 3   • dicyclomine (BENTYL) 10 MG Cap Take 10 mg by mouth 3 times a day with meals.     • mometasone (ELOCON) 0.1 % Cream Apply 1 g topically as needed (For rash on hip).     • montelukast (SINGULAIR) 10 MG Tab Take 10 mg by mouth every day.     • nortriptyline (PAMELOR) 10 MG Cap Take 10 mg by mouth at bedtime.     • Azelastine HCl 137 MCG/SPRAY Solution Administer 1 split tablet into affected nostril(S) at bedtime.     • fluticasone (FLONASE) 50 MCG/ACT nasal spray Administer 1 Spray into affected nostril(S) at bedtime.     • ondansetron (ZOFRAN ODT) 4 MG TABLET DISPERSIBLE Take 1 Tab by mouth every 8 hours as needed for Nausea. 10 Tab 0     No current facility-administered medications for this visit.     She  has a past medical history of Epilepsy (HCC), Gynecological disorder, Healthy adult, Heart burn, Hypertension, Indigestion, Lupus (HCC), and PONV (postoperative nausea and vomiting). She also has no past medical history of Hyperlipidemia.    ROS   As per HPI  No  "chest pain, no shortness of breath, no abdominal pain       Objective:     /82 (BP Location: Left arm, Patient Position: Sitting, BP Cuff Size: Adult)   Pulse 89   Temp 36.7 °C (98.1 °F) (Temporal)   Resp 16   Ht 1.702 m (5' 7\")   Wt 76.6 kg (168 lb 12.8 oz)   SpO2 96%  Body mass index is 26.44 kg/m².   Physical Exam:  Constitutional: Alert, no distress.  Skin: Warm, dry, good turgor, no rashes in visible areas.  Eye: Equal, round and reactive, conjunctiva clear, lids normal.  ENMT: Sinuses are TTP in maxillary sinuses, b/l.   Neck: Trachea midline, no masses, no thyromegaly. No cervical or supraclavicular lymphadenopathy  Respiratory: Unlabored respiratory effort, lungs clear to auscultation, no wheezes, no ronchi.  Cardiovascular: Normal S1, S2, no murmur, no edema.  Psych: Alert and oriented x3, normal affect and mood.        Assessment and Plan:   The following treatment plan was discussed    1. COVID-19  1. You are positive for COVID-19.  2. Health department typically should contact you for further direction and when you can return to work. Due to the increase in number of cases, some of the health departments are getting behind on this. For any further questions or concerns, you may contact them directly by calling the COVID-19 hotline at [phone] ([facility] and [facility]) Monday-Friday 8:30 AM - 4:30 PM.   3. If you have not heard from the health department in a timely manner, then follow the Centers for Disease Control (CDC) guidelines:      You can be around others after:      - 10 days since symptoms first appeared and   - 24 hours with no fever without the use of fever-reducing medications and   - Other symptoms of COVID-19 are improving*      4. If getting much worse, such as trouble breathing, chest pain, confusion, inability to stay awake, or turning blue in the lips or face, you need to go to Emergency Room.      5. In the meantime, your should quarantine " "as best as you can and follow the guidelines below:      - As much as possible, stay in a specific room and away from other people and pets in your home. If possible, you should use a separate bathroom. If you need to be around other people or animals in or outside of the home, wear a mask. Try to stay at least 6 feet away from other people.      - Tell your close contacts that they may have been exposed to COVID-19. An infected person can spread COVID-19 starting 48 hours (or 2 days) before the person has any symptoms or tests positive.      - Wash your hands often with soap and water for at least 20 seconds. This is especially important after blowing your nose, coughing, or sneezing; going to the bathroom; and before eating or preparing food.      - Use hand  if soap and water are not available. Use an alcohol-based hand  with at least 60% alcohol, covering all surfaces of your hands and rubbing them together until they feel dry.      - Do not share dishes, drinking glasses, cups, eating utensils, towels, or bedding with other people in your home.      - Clean and disinfect high-touch surfaces in your \"sick room\" and bathroom. Let someone else clean and disinfect surfaces in common areas, but you should clean your bedroom and bathroom, if possible.      2. Sinus pressure  Sinus pressure like;y 2/2 COVID infection as above.   - POCT SARS-COV Antigen JAIR (Symptomatic Only)    3. Screening for tuberculosis  - Quantiferon Gold TB (PPD); Future      Followup: Return if symptoms worsen or fail to improve.       Nohemy Cooper P.A.-C.  Supervising MD: Dr. Warner Dolan MD  01/04/22      "

## 2022-01-04 NOTE — LETTER
January 4, 2022    To Whom It May Concern:         This is confirmation that Shy Buchanan attended her scheduled appointment with Nohemy Cooper P.A.-C. on 1/04/22. She has tested positive for COVID-19 infection today. Given that she is on day 10 of symptoms today, she does not necessairly need to quarantine, per CDC guidlines. However, given that she does have a small baby at home, I recommend that she does take a few extra days to recover.        If you have any questions please do not hesitate to call me at the phone number listed below.    Sincerely,          Nohemy Cooper P.A.-C.  [phone]

## 2022-04-26 ENCOUNTER — HOSPITAL ENCOUNTER (OUTPATIENT)
Facility: MEDICAL CENTER | Age: 27
End: 2022-04-26
Attending: PHYSICIAN ASSISTANT
Payer: COMMERCIAL

## 2022-04-26 ENCOUNTER — OFFICE VISIT (OUTPATIENT)
Dept: MEDICAL GROUP | Facility: LAB | Age: 27
End: 2022-04-26
Payer: COMMERCIAL

## 2022-04-26 VITALS
OXYGEN SATURATION: 97 % | BODY MASS INDEX: 28.46 KG/M2 | DIASTOLIC BLOOD PRESSURE: 82 MMHG | SYSTOLIC BLOOD PRESSURE: 128 MMHG | RESPIRATION RATE: 16 BRPM | WEIGHT: 181.3 LBS | HEIGHT: 67 IN | HEART RATE: 90 BPM | TEMPERATURE: 98.7 F

## 2022-04-26 DIAGNOSIS — Z11.52 ENCOUNTER FOR SCREENING FOR COVID-19: ICD-10-CM

## 2022-04-26 PROCEDURE — U0005 INFEC AGEN DETEC AMPLI PROBE: HCPCS

## 2022-04-26 PROCEDURE — 99212 OFFICE O/P EST SF 10 MIN: CPT | Mod: CS | Performed by: PHYSICIAN ASSISTANT

## 2022-04-26 PROCEDURE — U0003 INFECTIOUS AGENT DETECTION BY NUCLEIC ACID (DNA OR RNA); SEVERE ACUTE RESPIRATORY SYNDROME CORONAVIRUS 2 (SARS-COV-2) (CORONAVIRUS DISEASE [COVID-19]), AMPLIFIED PROBE TECHNIQUE, MAKING USE OF HIGH THROUGHPUT TECHNOLOGIES AS DESCRIBED BY CMS-2020-01-R: HCPCS

## 2022-04-26 ASSESSMENT — FIBROSIS 4 INDEX: FIB4 SCORE: 0.68

## 2022-04-26 NOTE — ASSESSMENT & PLAN NOTE
Patient presents today to screen for COVID-19 infection prior to travel to [place].  She had COVID-19 infection in January 2022.  No active upper respiratory symptoms at this time.

## 2022-04-26 NOTE — LETTER
April 26, 2022    To Whom It May Concern:         This is confirmation that Shy Buchanan attended her scheduled appointment with Noehmy Cooper P.A.-C. on 4/26/22. She was seen and evaluated today and is in good health to care for a child.          If you have any questions please do not hesitate to call me at the phone number listed below.    Sincerely,          Nohemy Cooper P.A.-C.  [phone]

## 2022-04-26 NOTE — PROGRESS NOTES
Subjective:   CC: Shy Buchanan is a 26 y.o. female here today for COVID-19 screening    HPI:  Encounter for screening for COVID-19  Patient presents today to screen for COVID-19 infection prior to travel to [place].  She had COVID-19 infection in January 2022.  No active upper respiratory symptoms at this time.       Current medicines (including changes today)  Current Outpatient Medications   Medication Sig Dispense Refill   • albuterol (PROVENTIL) 2.5mg/3ml Nebu Soln solution for nebulization Take 3 mL by nebulization every four hours as needed for Shortness of Breath. 1 Each 2   • ibuprofen (MOTRIN) 800 MG Tab Take 1 Tablet by mouth every 8 hours as needed for Moderate Pain. 90 Tablet 0   • folic acid (FOLVITE) 1 MG Tab Take 2 Tablets by mouth every day. 60 Tablet 11   • dicyclomine (BENTYL) 10 MG Cap Take 10 mg by mouth 3 times a day with meals.     • mometasone (ELOCON) 0.1 % Cream Apply 1 g topically as needed (For rash on hip).     • montelukast (SINGULAIR) 10 MG Tab Take 10 mg by mouth every day.     • nortriptyline (PAMELOR) 10 MG Cap Take 10 mg by mouth at bedtime.     • Azelastine HCl 137 MCG/SPRAY Solution Administer 1 split tablet into affected nostril(S) at bedtime.     • fluticasone (FLONASE) 50 MCG/ACT nasal spray Administer 1 Spray into affected nostril(S) at bedtime.     • ondansetron (ZOFRAN ODT) 4 MG TABLET DISPERSIBLE Take 1 Tab by mouth every 8 hours as needed for Nausea. 10 Tab 0     No current facility-administered medications for this visit.     She  has a past medical history of Epilepsy (HCC), Gynecological disorder, Healthy adult, Heart burn, Hypertension, Indigestion, Lupus (HCC), and PONV (postoperative nausea and vomiting).    She has no past medical history of Hyperlipidemia.    ROS   No chest pain, no shortness of breath, no abdominal pain       Objective:     /82 (BP Location: Left arm, Patient Position: Sitting, BP Cuff Size: Adult)   Pulse 90   Temp 37.1 °C (98.7  "°F) (Temporal)   Resp 16   Ht 1.702 m (5' 7\")   Wt 82.2 kg (181 lb 4.8 oz)   SpO2 97%  Body mass index is 28.4 kg/m².   Physical Exam:  Constitutional: Alert, no distress.  Skin: Warm, dry, good turgor, no rashes in visible areas.  Eye: Equal, round, conjunctiva clear, lids normal.  Neck: Trachea midline  Respiratory: Unlabored respiratory effort  Psych: Alert and oriented x3, normal affect and mood.    Assessment and Plan:   The following treatment plan was discussed    1. Encounter for screening for COVID-19  Results will be sent to her via [employer] automatically.  Patient did have COVID-19 infection in January 2022, has fully recovered.  - SARS-CoV-2 PCR (24 hour In-House): Collect NP swab in [facility]; Future      Followup: Return if symptoms worsen or fail to improve.       Nohemy Cooper P.A.-C.  Supervising MD: Dr. Warner Dolan MD  04/26/22    "

## 2022-04-27 DIAGNOSIS — Z11.52 ENCOUNTER FOR SCREENING FOR COVID-19: ICD-10-CM

## 2022-08-18 ENCOUNTER — TELEMEDICINE (OUTPATIENT)
Dept: MEDICAL GROUP | Facility: LAB | Age: 27
End: 2022-08-18
Payer: COMMERCIAL

## 2022-08-18 VITALS — BODY MASS INDEX: 29.82 KG/M2 | HEIGHT: 67 IN | WEIGHT: 190 LBS

## 2022-08-18 DIAGNOSIS — R63.5 WEIGHT GAIN: ICD-10-CM

## 2022-08-18 DIAGNOSIS — R41.840 DIFFICULTY CONCENTRATING: ICD-10-CM

## 2022-08-18 DIAGNOSIS — M79.641 PAIN IN BOTH HANDS: Primary | ICD-10-CM

## 2022-08-18 DIAGNOSIS — M79.642 PAIN IN BOTH HANDS: Primary | ICD-10-CM

## 2022-08-18 PROCEDURE — 99214 OFFICE O/P EST MOD 30 MIN: CPT | Mod: 95 | Performed by: PHYSICIAN ASSISTANT

## 2022-08-18 ASSESSMENT — FIBROSIS 4 INDEX: FIB4 SCORE: 0.68

## 2022-08-19 PROBLEM — M79.641 PAIN IN BOTH HANDS: Status: ACTIVE | Noted: 2022-08-19

## 2022-08-19 PROBLEM — M79.642 PAIN IN BOTH HANDS: Status: ACTIVE | Noted: 2022-08-19

## 2022-08-19 PROBLEM — R41.840 DIFFICULTY CONCENTRATING: Status: ACTIVE | Noted: 2022-08-19

## 2022-08-19 PROBLEM — R63.5 WEIGHT GAIN: Status: ACTIVE | Noted: 2022-08-19

## 2022-08-19 PROBLEM — R21 RASH: Status: RESOLVED | Noted: 2019-03-21 | Resolved: 2022-08-19

## 2022-08-19 PROBLEM — J01.00 SUBACUTE MAXILLARY SINUSITIS: Status: RESOLVED | Noted: 2021-09-08 | Resolved: 2022-08-19

## 2022-08-19 PROBLEM — J34.89 SINUS PRESSURE: Status: RESOLVED | Noted: 2022-01-04 | Resolved: 2022-08-19

## 2022-08-19 NOTE — PROGRESS NOTES
This evaluation was conducted via Zoom using secure and encrypted videoconferencing technology. The patient was in their home in the [facility].    The patient's identity was confirmed and verbal consent was obtained for this virtual visit.    Subjective:     CC: several concerns   Shy Buchanan is a 26 y.o. female presenting to discuss the evaluation and management of hand pain, weight gain and ADD eval.     Pain in both hands  Follow up; initially started with left wrist pain, however, now having pain in b/l hands and wrists.   Noticing some weakness in b/l hands.   Did have an eval by Ortho, though would like to go back.     Weight gain  New to me; acutely gaining weight. States that she has gained about 25-30lbs since 01/2022.   Has been trying to exercise more and improve her diet, though feels that the weight gain is quite excessive.     Difficulty concentrating  New to me; chronic and worsening.   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 Struggling to concentrate and feels like this is increasing anxiety symptoms as well as impacting her work and home life.     Unsure if she was formally diagnosed with ADD as a child, though knows that she had a IEP all throughout her school years.       ROS  See HPI  Constitutional: Negative for fever, chills and malaise/fatigue.   Respiratory: Negative for cough and shortness of breath.    Cardiovascular: Negative for leg swelling.   Skin: Negative for rash.   Psychiatric/Behavioral: Negative for depression.  The patient is not nervous/anxious.      Allergies   Allergen Reactions    Hydrocodone-Acetaminophen Nausea    Vicodin [Hydrocodone-Acetaminophen] Vomiting    Contrast Media With Iodine [Iodine] Hives and Itching    Latex Hives, Itching and Rash    Lidocaine Hives       Current medicines (including changes today)  Current Outpatient Medications   Medication Sig Dispense Refill    albuterol (PROVENTIL) 2.5mg/3ml Nebu Soln  solution for nebulization Take 3 mL by nebulization every four hours as needed for Shortness of Breath. 1 Each 2    ibuprofen (MOTRIN) 800 MG Tab Take 1 Tablet by mouth every 8 hours as needed for Moderate Pain. 90 Tablet 0    folic acid (FOLVITE) 1 MG Tab Take 2 Tablets by mouth every day. 60 Tablet 11    dicyclomine (BENTYL) 10 MG Cap Take 10 mg by mouth 3 times a day with meals.      mometasone (ELOCON) 0.1 % Cream Apply 1 g topically as needed (For rash on hip).      montelukast (SINGULAIR) 10 MG Tab Take 10 mg by mouth every day.      nortriptyline (PAMELOR) 10 MG Cap Take 10 mg by mouth at bedtime.      Azelastine HCl 137 MCG/SPRAY Solution Administer 1 split tablet into affected nostril(S) at bedtime.      fluticasone (FLONASE) 50 MCG/ACT nasal spray Administer 1 Spray into affected nostril(S) at bedtime.      ondansetron (ZOFRAN ODT) 4 MG TABLET DISPERSIBLE Take 1 Tab by mouth every 8 hours as needed for Nausea. 10 Tab 0     No current facility-administered medications for this visit.       She  has a past medical history of Epilepsy (Former[facility]), Gynecological disorder, Headache (12/31/2014), Healthy adult, Heart burn, Hypertension, Indigestion, Lupus (HCC), PONV (postoperative nausea and vomiting), and Rash (3/21/2019).    She has no past medical history of Hyperlipidemia.  She  has a past surgical history that includes appendectomy and gyn surgery.      Family History   Problem Relation Age of Onset    Lung Disease Mother     Hypertension Mother     Lung Disease Father     Lung Disease Sister     Hypertension Sister      Family Status   Relation Name Status    Mo  Alive    Fa  Alive    Sis 5 Alive    Bro  Alive       Patient Active Problem List    Diagnosis Date Noted    Pain in both hands 08/19/2022    Weight gain 08/19/2022    Difficulty concentrating 08/19/2022    Encounter for screening for COVID-19 04/26/2022    Generalized abdominal pain 05/17/2021    Left wrist pain 04/22/2021    Cervicalgia 09/30/2020     "Seizure (HCC) 09/16/2020    Fatigue 09/16/2020    Positive SCOTT (antinuclear antibody) 09/16/2020    Food intolerance in adult 06/07/2019    Hyperprolactinemia (HCC) 06/07/2019    Endometriosis 05/29/2019    Intrinsic eczema 05/29/2019    Non-intractable vomiting with nausea 03/21/2019    Irritable bowel syndrome with both constipation and diarrhea 03/21/2019    Leukocytosis 12/31/2014          Objective:   Ht 1.702 m (5' 7\")   Wt 86.2 kg (190 lb)   BMI 29.76 kg/m²     Physical Exam:  Constitutional: Alert, no distress, well-groomed.  Skin: No rashes in visible areas.  Eye: Round. Conjunctiva clear, lids normal. No icterus.   ENMT: Lips pink without lesions, good dentition, moist mucous membranes. Phonation normal.  Neck: No masses, no thyromegaly. Moves freely without pain.  Respiratory: Unlabored respiratory effort, no cough or audible wheeze  Psych: Alert and oriented x3, normal affect and mood.       Assessment and Plan:   The following treatment plan was discussed:     1. Pain in both hands  - Referral to Hand Surgery  - Referral to Occupational Therapy    2. Difficulty concentrating  Discussed that she would benefit from formal testing for ADD.   Agreeable to taking over treatment if ADD is diagnosed by psychiatrist.  Continue to monitor, f/u in 2-3 weeks of course sooner as needed.    - Referral to Behavioral Health    3. Weight gain  BMI was assessed today and reviewed with patient as meeting criteria for the risk factor obesity.  Willing to change as well as barriers were assessed.   Wt Readings from Last 3 Encounters:   08/18/22 86.2 kg (190 lb)   04/26/22 82.2 kg (181 lb 4.8 oz)   01/04/22 76.6 kg (168 lb 12.8 oz)   Continue to increase exercise as tolerated. Reduce carbohydrates in diet as well.   Will r/o subclinical thyroid disease as well as changes in cortisol.   - TSH WITH REFLEX TO FT4; Future  - TSH; Future  - FREE THYROXINE; Future  - THYROID PEROXIDASE  (TPO) AB; Future  - CORTISOL - " AM        Follow-up: Return in about 4 weeks (around 9/15/2022), or if symptoms worsen or fail to improve.    Nohemy Cooper P.A.-C.  Supervising MD: Dr. Warner Dolan MD  08/19/22

## 2022-08-19 NOTE — ASSESSMENT & PLAN NOTE
New to me; chronic and worsening.   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 Struggling to concentrate and feels like this is increasing anxiety symptoms as well as impacting her work and home life.     Unsure if she was formally diagnosed with ADD as a child, though knows that she had a IEP all throughout her school years.

## 2022-08-19 NOTE — ASSESSMENT & PLAN NOTE
New to me; acutely gaining weight. States that she has gained about 25-30lbs since 01/2022.   Has been trying to exercise more and improve her diet, though feels that the weight gain is quite excessive.

## 2022-08-19 NOTE — ASSESSMENT & PLAN NOTE
Follow up; initially started with left wrist pain, however, now having pain in b/l hands and wrists.   Noticing some weakness in b/l hands.   Did have an eval by Ortho, though would like to go back.

## 2022-11-27 ENCOUNTER — OFFICE VISIT (OUTPATIENT)
Dept: URGENT CARE | Facility: CLINIC | Age: 27
End: 2022-11-27
Payer: COMMERCIAL

## 2022-11-27 VITALS
SYSTOLIC BLOOD PRESSURE: 118 MMHG | DIASTOLIC BLOOD PRESSURE: 84 MMHG | HEART RATE: 65 BPM | TEMPERATURE: 98.1 F | RESPIRATION RATE: 18 BRPM | WEIGHT: 175 LBS | HEIGHT: 67 IN | BODY MASS INDEX: 27.47 KG/M2 | OXYGEN SATURATION: 98 %

## 2022-11-27 DIAGNOSIS — J01.90 ACUTE BACTERIAL SINUSITIS: ICD-10-CM

## 2022-11-27 DIAGNOSIS — B96.89 ACUTE BACTERIAL SINUSITIS: ICD-10-CM

## 2022-11-27 DIAGNOSIS — Z20.822 EXPOSURE TO CONFIRMED CASE OF COVID-19: ICD-10-CM

## 2022-11-27 DIAGNOSIS — R68.89 FLU-LIKE SYMPTOMS: ICD-10-CM

## 2022-11-27 LAB
EXTERNAL QUALITY CONTROL: NORMAL
FLUAV+FLUBV AG SPEC QL IA: NEGATIVE
INT CON NEG: NORMAL
INT CON NEG: NORMAL
INT CON POS: NORMAL
INT CON POS: NORMAL
SARS-COV+SARS-COV-2 AG RESP QL IA.RAPID: NEGATIVE

## 2022-11-27 PROCEDURE — 99214 OFFICE O/P EST MOD 30 MIN: CPT | Mod: CS | Performed by: FAMILY MEDICINE

## 2022-11-27 PROCEDURE — 87804 INFLUENZA ASSAY W/OPTIC: CPT | Performed by: FAMILY MEDICINE

## 2022-11-27 PROCEDURE — 87426 SARSCOV CORONAVIRUS AG IA: CPT | Performed by: FAMILY MEDICINE

## 2022-11-27 RX ORDER — LAMOTRIGINE 100 MG/1
100 TABLET ORAL DAILY
COMMUNITY
End: 2023-01-18 | Stop reason: SDUPTHER

## 2022-11-27 RX ORDER — AMOXICILLIN AND CLAVULANATE POTASSIUM 875; 125 MG/1; MG/1
TABLET, FILM COATED ORAL
Qty: 14 TABLET | Refills: 0 | Status: SHIPPED | OUTPATIENT
Start: 2022-11-27 | End: 2022-12-04

## 2022-11-27 ASSESSMENT — FIBROSIS 4 INDEX: FIB4 SCORE: 0.7

## 2022-11-27 NOTE — PROGRESS NOTES
"Chief Complaint:    Chief Complaint   Patient presents with    Coronavirus Screening     X2weeks, symptoms started on 11/10/22, Runny nose, cough, wheezing, joint pain, fatigue and headache, states her son just tested positive for covid yesterday        History of Present Illness:    Symptoms worsened starting 11/16/22. Has worsening fatigue and body and body aches starting 3 days ago. Nasal symptoms with purulent mucus from nose, sore throat, and cough productive of purulent mucus have been present x over 10 days. She sometimes gets sinus infections for which Augmentin works well. Feels may have sinus infection. Son tested positive for Covid yesterday and was negative for flu and RSV.      Past Medical History:    Past Medical History:   Diagnosis Date    Epilepsy (HCC)     Gynecological disorder     Endometriosis    Headache 12/31/2014    Healthy adult     Heart burn     Hypertension     off medication for 2 years    Indigestion     Lupus (HCC)     PONV (postoperative nausea and vomiting)     Rash 3/21/2019     Past Surgical History:    Past Surgical History:   Procedure Laterality Date    APPENDECTOMY      GYN SURGERY      ovarian cyst \"drained\"     Social History:    Social History     Socioeconomic History    Marital status: Other     Spouse name: Not on file    Number of children: Not on file    Years of education: Not on file    Highest education level: Not on file   Occupational History    Not on file   Tobacco Use    Smoking status: Never    Smokeless tobacco: Never   Vaping Use    Vaping Use: Never used   Substance and Sexual Activity    Alcohol use: Yes     Comment: occ    Drug use: Yes     Types: Marijuana, Inhaled     Comment: cannabis daily    Sexual activity: Yes     Partners: Male   Other Topics Concern    Not on file   Social History Narrative    Not on file     Social Determinants of Health     Financial Resource Strain: Not on file   Food Insecurity: Not on file   Transportation Needs: Not on file "   Physical Activity: Not on file   Stress: Not on file   Social Connections: Not on file   Intimate Partner Violence: Not on file   Housing Stability: Not on file     Family History:    Family History   Problem Relation Age of Onset    Lung Disease Mother     Hypertension Mother     Lung Disease Father     Lung Disease Sister     Hypertension Sister      Medications:    Current Outpatient Medications on File Prior to Visit   Medication Sig Dispense Refill    albuterol (PROVENTIL) 2.5mg/3ml Nebu Soln solution for nebulization Take 3 mL by nebulization every four hours as needed for Shortness of Breath. 1 Each 2    ibuprofen (MOTRIN) 800 MG Tab Take 1 Tablet by mouth every 8 hours as needed for Moderate Pain. 90 Tablet 0    folic acid (FOLVITE) 1 MG Tab Take 2 Tablets by mouth every day. 60 Tablet 11    dicyclomine (BENTYL) 10 MG Cap Take 10 mg by mouth 3 times a day with meals.      mometasone (ELOCON) 0.1 % Cream Apply 1 g topically as needed (For rash on hip).      montelukast (SINGULAIR) 10 MG Tab Take 10 mg by mouth every day.      nortriptyline (PAMELOR) 10 MG Cap Take 10 mg by mouth at bedtime.      Azelastine HCl 137 MCG/SPRAY Solution Administer 1 split tablet into affected nostril(S) at bedtime.      fluticasone (FLONASE) 50 MCG/ACT nasal spray Administer 1 Spray into affected nostril(S) at bedtime.      ondansetron (ZOFRAN ODT) 4 MG TABLET DISPERSIBLE Take 1 Tab by mouth every 8 hours as needed for Nausea. 10 Tab 0     No current facility-administered medications on file prior to visit.     Allergies:    Allergies   Allergen Reactions    Hydrocodone-Acetaminophen Nausea    Vicodin [Hydrocodone-Acetaminophen] Vomiting    Contrast Media With Iodine [Iodine] Hives and Itching    Latex Hives, Itching and Rash    Lidocaine Hives       Vitals:    Vitals:    11/27/22 1017   BP: 118/84   Pulse: 65   Resp: 18   Temp: 36.7 °C (98.1 °F)   TempSrc: Temporal   SpO2: 98%   Weight: 79.4 kg (175 lb)   Height: 1.702 m (5'  "7\")       Physical Exam:    Constitutional: Vital signs reviewed. Appears well-developed and well-nourished. Occl cough. No acute distress.   Eyes: Sclera white, conjunctivae clear.   ENT: TTP bilateral maxillary sinus regions. External ears normal. External auditory canals normal without discharge. TMs translucent and non-bulging. Hearing normal. Lips/teeth are normal. Oral mucosa pink and moist. Posterior pharynx: WNL.  Neck: Neck supple.   Cardiovascular: Regular rate and rhythm. No murmur.  Pulmonary/Chest: Respirations non-labored. Clear to auscultation bilaterally.  Musculoskeletal: Normal gait. No muscular atrophy or weakness.  Neurological: Alert and oriented to person, place, and time. Muscle tone normal. Coordination normal.   Skin: No rashes or lesions. Warm, dry, normal turgor.  Psychiatric: Normal mood and affect. Behavior is normal. Judgment and thought content normal.       Diagnostics:    Rapid Flu test is negative.    POC Covid test is negative.      Medical Decision Makin. Exposure to confirmed case of COVID-19  - POCT SARS-COV Antigen JAIR (Symptomatic only)    2. Flu-like symptoms  - POCT Influenza A/B    3. Acute bacterial sinusitis  - amoxicillin-clavulanate (AUGMENTIN) 875-125 MG Tab; 1 TAB BY MOUTH TWICE A DAY X 7 DAYS. TAKE WITH FOOD.  Dispense: 14 Tablet; Refill: 0       Discussed with her DDX, management options, and risks, benefits, and alternatives to treatment plan agreed upon.    Symptoms worsened starting 22. Has worsening fatigue and body and body aches starting 3 days ago. Nasal symptoms with purulent mucus from nose, sore throat, and cough productive of purulent mucus have been present x over 10 days. She sometimes gets sinus infections for which Augmentin works well. Feels may have sinus infection. Son tested positive for Covid yesterday and was negative for flu and RSV.    Occl cough. TTP bilateral maxillary sinus regions.    Rapid Flu test is negative.    POC Covid " test is negative.    May use over-the-counter meds for symptoms as needed.     Agreeable to medication prescribed.    Discussed expected course of duration, time for improvement, and to seek follow-up in Emergency Room, urgent care, or with PCP if getting worse at any time or not improving within expected time frame.

## 2022-11-27 NOTE — LETTER
November 27, 2022         Patient: Shy Buchanan   YOB: 1995   Date of Visit: 11/27/2022           To Whom it May Concern:    Shy Buchanan was seen in my clinic on 11/27/2022.     Covid and Flu tests are negative today.    If you have any questions or concerns, please don't hesitate to call.        Sincerely,           Sander Mary M.D.  Electronically Signed

## 2022-12-31 ENCOUNTER — OFFICE VISIT (OUTPATIENT)
Dept: URGENT CARE | Facility: PHYSICIAN GROUP | Age: 27
End: 2022-12-31
Payer: COMMERCIAL

## 2022-12-31 VITALS
DIASTOLIC BLOOD PRESSURE: 62 MMHG | RESPIRATION RATE: 16 BRPM | SYSTOLIC BLOOD PRESSURE: 102 MMHG | WEIGHT: 200 LBS | HEART RATE: 90 BPM | TEMPERATURE: 99.3 F | BODY MASS INDEX: 31.32 KG/M2 | OXYGEN SATURATION: 99 %

## 2022-12-31 DIAGNOSIS — H60.00 FURUNCLE OF EAR CANAL: ICD-10-CM

## 2022-12-31 PROCEDURE — 99213 OFFICE O/P EST LOW 20 MIN: CPT | Performed by: NURSE PRACTITIONER

## 2022-12-31 RX ORDER — OFLOXACIN 3 MG/ML
5 SOLUTION AURICULAR (OTIC) DAILY
Qty: 10 ML | Refills: 0 | Status: SHIPPED | OUTPATIENT
Start: 2022-12-31 | End: 2023-01-07

## 2022-12-31 ASSESSMENT — FIBROSIS 4 INDEX: FIB4 SCORE: 0.7

## 2023-01-01 ASSESSMENT — ENCOUNTER SYMPTOMS
SORE THROAT: 0
MYALGIAS: 0
FEVER: 0
NAUSEA: 0
VOMITING: 0
CHILLS: 0

## 2023-01-01 NOTE — PROGRESS NOTES
"Subjective:     Shy Buchanan is a 27 y.o. female who presents for Otalgia (Right)      Otalgia   Pertinent negatives include no sore throat or vomiting.   Pt presents for evaluation of a new problem. Shy is a very pleasant 27-year-old female presents to urgent care today with complaints of right-sided ear pain that started yesterday.  Her pain has progressively worsened.  She notes pain with palpation of her ear.  Negative for drainage or changes in hearing.  She has used ibuprofen for her discomfort.  She denies congestion, fever, sore throat.  Her pain is rated as severe.    Review of Systems   Constitutional:  Negative for chills and fever.   HENT:  Positive for ear pain. Negative for sore throat.    Gastrointestinal:  Negative for nausea and vomiting.   Musculoskeletal:  Negative for myalgias.     PMH:   Past Medical History:   Diagnosis Date    Epilepsy (HCC)     Gynecological disorder     Endometriosis    Headache 12/31/2014    Healthy adult     Heart burn     Hypertension     off medication for 2 years    Indigestion     Lupus (HCC)     PONV (postoperative nausea and vomiting)     Rash 3/21/2019     ALLERGIES:   Allergies   Allergen Reactions    Hydrocodone-Acetaminophen Nausea    Vicodin [Hydrocodone-Acetaminophen] Vomiting    Contrast Media With Iodine [Iodine] Hives and Itching    Latex Hives, Itching and Rash    Lidocaine Hives     SURGHX:   Past Surgical History:   Procedure Laterality Date    APPENDECTOMY      GYN SURGERY      ovarian cyst \"drained\"     SOCHX:   Social History     Socioeconomic History    Marital status: Other   Tobacco Use    Smoking status: Never    Smokeless tobacco: Never   Vaping Use    Vaping Use: Never used   Substance and Sexual Activity    Alcohol use: Yes     Comment: occ    Drug use: Yes     Types: Marijuana, Inhaled     Comment: cannabis daily    Sexual activity: Yes     Partners: Male     FH:   Family History   Problem Relation Age of Onset    Lung Disease Mother     " Hypertension Mother     Lung Disease Father     Lung Disease Sister     Hypertension Sister          Objective:   /62 (BP Location: Right arm, Patient Position: Sitting, BP Cuff Size: Adult)   Pulse 90   Temp 37.4 °C (99.3 °F)   Resp 16   Wt 90.7 kg (200 lb)   SpO2 99%   BMI 31.32 kg/m²     Physical Exam  Vitals and nursing note reviewed.   Constitutional:       General: She is not in acute distress.     Appearance: Normal appearance. She is normal weight. She is not ill-appearing or toxic-appearing.   HENT:      Head: Normocephalic.      Right Ear: External ear normal.      Left Ear: External ear normal.      Ears:      Comments: Pustule present to entry of canal of right ear.  Area was cleansed with alcohol swab and for funucle was punctured with 25-gauge needle.  Purulent material relieved.  She does note decreased pressure following relief of purulent fluid.  Swelling and erythema of right canal present.  TTP to light palpation.     Nose: No congestion or rhinorrhea.      Mouth/Throat:      Pharynx: No oropharyngeal exudate or posterior oropharyngeal erythema.   Eyes:      General:         Right eye: No discharge.         Left eye: No discharge.      Pupils: Pupils are equal, round, and reactive to light.   Pulmonary:      Effort: Pulmonary effort is normal.   Abdominal:      General: Abdomen is flat.   Musculoskeletal:         General: Normal range of motion.      Cervical back: Normal range of motion and neck supple.   Skin:     General: Skin is dry.   Neurological:      General: No focal deficit present.      Mental Status: She is alert and oriented to person, place, and time. Mental status is at baseline.   Psychiatric:         Mood and Affect: Mood normal.         Behavior: Behavior normal.         Thought Content: Thought content normal.         Judgment: Judgment normal.       Assessment/Plan:   Assessment    1. Furuncle of ear canal  ofloxacin otic sol (FLOXIN OTIC) 0.3 % Solution        I  encouraged use of hot compresses, ibuprofen/Tylenol for relief of discomfort.  Ofloxacin sent to pharmacy for further treatment of otitis externa secondary to furuncle. Follow up for worsening symptoms.   AVS handout given and reviewed with patient. Pt educated on red flags and when to seek treatment back in ER or UC.

## 2023-01-18 ENCOUNTER — OFFICE VISIT (OUTPATIENT)
Dept: MEDICAL GROUP | Facility: LAB | Age: 28
End: 2023-01-18
Payer: COMMERCIAL

## 2023-01-18 VITALS
RESPIRATION RATE: 15 BRPM | WEIGHT: 200 LBS | HEIGHT: 67 IN | DIASTOLIC BLOOD PRESSURE: 74 MMHG | SYSTOLIC BLOOD PRESSURE: 118 MMHG | HEART RATE: 72 BPM | BODY MASS INDEX: 31.39 KG/M2 | TEMPERATURE: 97.9 F | OXYGEN SATURATION: 100 %

## 2023-01-18 DIAGNOSIS — F31.9 BIPOLAR DISEASE, CHRONIC (HCC): ICD-10-CM

## 2023-01-18 DIAGNOSIS — Z23 NEED FOR VACCINATION: ICD-10-CM

## 2023-01-18 DIAGNOSIS — R56.9 SEIZURE (HCC): ICD-10-CM

## 2023-01-18 PROCEDURE — 99214 OFFICE O/P EST MOD 30 MIN: CPT | Mod: 25 | Performed by: FAMILY MEDICINE

## 2023-01-18 PROCEDURE — 90471 IMMUNIZATION ADMIN: CPT | Performed by: FAMILY MEDICINE

## 2023-01-18 PROCEDURE — 90686 IIV4 VACC NO PRSV 0.5 ML IM: CPT | Performed by: FAMILY MEDICINE

## 2023-01-18 RX ORDER — ONDANSETRON 4 MG/1
4 TABLET, ORALLY DISINTEGRATING ORAL EVERY 8 HOURS PRN
Qty: 10 TABLET | Refills: 0 | Status: SHIPPED | OUTPATIENT
Start: 2023-01-18 | End: 2024-01-28

## 2023-01-18 RX ORDER — ATOMOXETINE 40 MG/1
40 CAPSULE ORAL DAILY
COMMUNITY
End: 2023-05-11

## 2023-01-18 RX ORDER — LAMOTRIGINE 100 MG/1
100 TABLET ORAL DAILY
Qty: 30 TABLET | Refills: 6 | Status: SHIPPED | OUTPATIENT
Start: 2023-01-18

## 2023-01-18 ASSESSMENT — ENCOUNTER SYMPTOMS
DIZZINESS: 0
PALPITATIONS: 0
HEADACHES: 0
SHORTNESS OF BREATH: 0
WHEEZING: 0
FEVER: 0
BLURRED VISION: 0

## 2023-01-18 ASSESSMENT — FIBROSIS 4 INDEX: FIB4 SCORE: 0.7

## 2023-01-18 NOTE — LETTER
January 18, 2023       Patient: Shy Buchanan   YOB: 1995   Date of Visit: 1/18/2023         To Whom It May Concern:    In my medical opinion, I recommend that Shy Buchanan receive certain accomodations at work given medical history. Accomodations needed include having all directives and detailed instructions be written.      If you have any questions or concerns, please don't hesitate to call [phone]          Sincerely,          Salomon Chauhan M.D.

## 2023-03-03 ENCOUNTER — OFFICE VISIT (OUTPATIENT)
Dept: MEDICAL GROUP | Facility: LAB | Age: 28
End: 2023-03-03
Payer: COMMERCIAL

## 2023-03-03 VITALS
TEMPERATURE: 97.8 F | SYSTOLIC BLOOD PRESSURE: 110 MMHG | HEART RATE: 70 BPM | OXYGEN SATURATION: 99 % | WEIGHT: 203 LBS | DIASTOLIC BLOOD PRESSURE: 72 MMHG | HEIGHT: 67 IN | BODY MASS INDEX: 31.86 KG/M2

## 2023-03-03 DIAGNOSIS — R63.5 WEIGHT GAIN: ICD-10-CM

## 2023-03-03 DIAGNOSIS — Z88.4 ALLERGY STATUS TO ANESTHETIC AGENT: ICD-10-CM

## 2023-03-03 DIAGNOSIS — F31.9 BIPOLAR DISEASE, CHRONIC (HCC): ICD-10-CM

## 2023-03-03 DIAGNOSIS — Z76.89 ENCOUNTER TO ESTABLISH CARE: ICD-10-CM

## 2023-03-03 DIAGNOSIS — R56.9 SEIZURE (HCC): ICD-10-CM

## 2023-03-03 DIAGNOSIS — E22.1 HYPERPROLACTINEMIA (HCC): ICD-10-CM

## 2023-03-03 DIAGNOSIS — Z13.6 SCREENING FOR CARDIOVASCULAR CONDITION: ICD-10-CM

## 2023-03-03 PROCEDURE — 99214 OFFICE O/P EST MOD 30 MIN: CPT | Performed by: FAMILY MEDICINE

## 2023-03-03 RX ORDER — MONTELUKAST SODIUM 10 MG/1
10 TABLET ORAL DAILY
Qty: 90 TABLET | Refills: 3 | Status: SHIPPED | OUTPATIENT
Start: 2023-03-03 | End: 2024-02-26

## 2023-03-03 RX ORDER — TRAZODONE HYDROCHLORIDE 50 MG/1
TABLET ORAL PRN
COMMUNITY
Start: 2023-02-07

## 2023-03-03 ASSESSMENT — FIBROSIS 4 INDEX: FIB4 SCORE: 0.7

## 2023-03-03 NOTE — PROGRESS NOTES
"Subjective:   Shy Buchanan is a 27 y.o. female here today for   Chief Complaint   Patient presents with    Establish Care       #Establish care   -Reviewed all past medical history, family history, social history.  -Reviewed all screening/vaccinations:   -Diet and Exercise:   -Tobacco, alcohol, recreational drug use: Denies any alcohol use. Denies smoking. Occasional tincture THC  -Sexually active:   -Established dental care.   -Occupation:     #Seizure disorder:  -Currently treated with Lamictal, being followed by neurology.  Previous appointment he was concerned about episodes of \"spacing out\" but the symptoms have improved.  No other seizure symptoms or concerns at this time.    #Bipolar:  -Condition currently treating with cariparzine.  Being followed closely by psychiatry.  No concerns at this time.    #Hyperprolactinemia:  -At 1 point patient was told she had high prolactin levels which were caused by an \"brain tumor\".  She feels like this was never followed up.  She denies any significant symptoms.  Denies any irregular menses, abnormal lactation.  This has been concerning as she feels like if there is something going on she would like to know and work on treatment.  Requesting evaluation at this time.    #Weight gain:  -Since starting new medication for bipolar she has noticed significant weight gain.  She is work on appropriate diet and exercise regimen at this time.    #Allergy to lidocaine:  -Significant symptoms seen during previous dental procedures and would like lidocaine listed as an allergy.      Allergies   Allergen Reactions    Hydrocodone-Acetaminophen Nausea    Vicodin [Hydrocodone-Acetaminophen] Vomiting    Contrast Media With Iodine [Iodine] Hives and Itching    Latex Hives, Itching and Rash    Lidocaine Hives         Current medicines (including changes today)  Current Outpatient Medications   Medication Sig Dispense Refill    Cariprazine HCl 1.5 MG Cap Take 1 Capsule by mouth every day. "      ondansetron (ZOFRAN ODT) 4 MG TABLET DISPERSIBLE Take 1 Tablet by mouth every 8 hours as needed for Nausea/Vomiting. 10 Tablet 0    lamoTRIgine (LAMICTAL) 100 MG Tab Take 1 Tablet by mouth every day. 30 Tablet 6    folic acid (FOLVITE) 1 MG Tab Take 2 Tablets by mouth every day. 60 Tablet 11    mometasone (ELOCON) 0.1 % Cream Apply 1 g topically as needed (For rash on hip).      montelukast (SINGULAIR) 10 MG Tab Take 10 mg by mouth every day.      traZODone (DESYREL) 50 MG Tab as needed.      atomoxetine (STRATTERA) 40 MG capsule Take 40 mg by mouth every day.      albuterol (PROVENTIL) 2.5mg/3ml Nebu Soln solution for nebulization Take 3 mL by nebulization every four hours as needed for Shortness of Breath. 1 Each 2    ibuprofen (MOTRIN) 800 MG Tab Take 1 Tablet by mouth every 8 hours as needed for Moderate Pain. 90 Tablet 0    nortriptyline (PAMELOR) 10 MG Cap Take 10 mg by mouth at bedtime. (Patient not taking: Reported on 3/3/2023)      Azelastine HCl 137 MCG/SPRAY Solution Administer 1 split tablet into affected nostril(S) at bedtime.      fluticasone (FLONASE) 50 MCG/ACT nasal spray Administer 1 Spray into affected nostril(S) at bedtime.       No current facility-administered medications for this visit.     She  has a past medical history of Epilepsy ([facility]), Gynecological disorder, Headache (12/31/2014), Healthy adult, Heart burn, Hypertension, Indigestion, Lupus ([facility]), PONV (postoperative nausea and vomiting), and Rash (3/21/2019).    She has no past medical history of Hyperlipidemia.    ROS   Review of Systems   Constitutional:  Negative for chills and fever.   Respiratory:  Negative for shortness of breath and wheezing.    Cardiovascular:  Negative for chest pain and palpitations.   Gastrointestinal:  Negative for abdominal pain, constipation, diarrhea, nausea and vomiting.   Psychiatric/Behavioral:  Negative for depression. The patient is not nervous/anxious.         Objective:     Physical Exam:  BP  "110/72   Pulse 70   Temp 36.6 °C (97.8 °F) (Temporal)   Ht 1.702 m (5' 7\")   Wt 92.1 kg (203 lb)   SpO2 99%  Body mass index is 31.79 kg/m².   Constitutional: Alert, no distress.  Skin: Warm, dry, good turgor, no rashes in visible areas.  Eye: Equal, round and reactive, conjunctiva clear, lids normal.  Respiratory: Unlabored respiratory effort, lungs clear to auscultation, no wheezes, no rhonchi.  Cardiovascular: Normal S1, S2, no murmur, no edema.  Abdomen: Soft, non-tender, no masses, no hepatosplenomegaly.  Psych: Alert and oriented x3, normal affect and mood.    Assessment and Plan:     1. Encounter to establish care  -All questions concerns were answered at this time.  -Vaccinations/screenings needed at this time:we will follow-up with patient to discuss exams.  Pap smear needed.  -Labs reviewed, concerns, check labs as below.  -Discussed the importance of a healthy, Mediterranean style diet, routine exercise regimen.  -Discussed general safety measures including seatbelts, helmets, avoidance of smoking, sunscreen, hydration.  -Follow-up for general physical exam on a yearly basis, sooner if needed.  - CBC WITH DIFFERENTIAL; Future  - Comp Metabolic Panel; Future    2. Hyperprolactinemia (HCC)  -We will recheck prolactin level at this time.  Patient begins to have symptoms with elevated prolactin level then we will discuss further evaluation.  - PROLACTIN; Future    3. Weight gain  For most likely secondary to psychotics being used to treat bipolar.  I encourage patient to follow-up with psychiatry to discuss possible treatment measures.  -We will complete labs rule out any other secondary causes.  - TSH WITH REFLEX TO FT4; Future    4. Allergy status to anesthetic agent  -Given allergy to lidocaine she would like to discuss further testing to actually confirm this allergy or if it was just a simple ingredient within the lidocaine.  Referral to allergy has been placed.  - Referral to Allergy    5. Bipolar " disease, chronic (HCC)  -Stable but there is some concerns regarding side effects to new medication.  Patient will follow-up with psychiatry.    6. Seizure (HCC)  Stable, no concerns.  We will continue with Lamictal.    7. Screening for cardiovascular condition  - Lipid Profile; Future      Followup: No follow-ups on file.         PLEASE NOTE: This dictation was created using voice recognition software. I have made every reasonable attempt to correct obvious errors, but I expect that there are errors of grammar and possibly content that I did not discover before finalizing the note.

## 2023-03-07 ENCOUNTER — HOSPITAL ENCOUNTER (OUTPATIENT)
Dept: LAB | Facility: MEDICAL CENTER | Age: 28
End: 2023-03-07
Attending: FAMILY MEDICINE
Payer: COMMERCIAL

## 2023-03-07 ENCOUNTER — HOSPITAL ENCOUNTER (OUTPATIENT)
Dept: LAB | Facility: MEDICAL CENTER | Age: 28
End: 2023-03-07
Attending: STUDENT IN AN ORGANIZED HEALTH CARE EDUCATION/TRAINING PROGRAM
Payer: COMMERCIAL

## 2023-03-07 DIAGNOSIS — Z13.6 SCREENING FOR CARDIOVASCULAR CONDITION: ICD-10-CM

## 2023-03-07 DIAGNOSIS — Z76.89 ENCOUNTER TO ESTABLISH CARE: ICD-10-CM

## 2023-03-07 DIAGNOSIS — E22.1 HYPERPROLACTINEMIA (HCC): ICD-10-CM

## 2023-03-07 DIAGNOSIS — R63.5 WEIGHT GAIN: ICD-10-CM

## 2023-03-07 LAB
BASOPHILS # BLD AUTO: 0.6 % (ref 0–1.8)
BASOPHILS # BLD AUTO: 0.6 % (ref 0–1.8)
BASOPHILS # BLD: 0.06 K/UL (ref 0–0.12)
BASOPHILS # BLD: 0.06 K/UL (ref 0–0.12)
EOSINOPHIL # BLD AUTO: 0.47 K/UL (ref 0–0.51)
EOSINOPHIL # BLD AUTO: 0.52 K/UL (ref 0–0.51)
EOSINOPHIL NFR BLD: 4.9 % (ref 0–6.9)
EOSINOPHIL NFR BLD: 5.4 % (ref 0–6.9)
ERYTHROCYTE [DISTWIDTH] IN BLOOD BY AUTOMATED COUNT: 41.9 FL (ref 35.9–50)
ERYTHROCYTE [DISTWIDTH] IN BLOOD BY AUTOMATED COUNT: 42.2 FL (ref 35.9–50)
HCT VFR BLD AUTO: 44.2 % (ref 37–47)
HCT VFR BLD AUTO: 44.6 % (ref 37–47)
HGB BLD-MCNC: 14.4 G/DL (ref 12–16)
HGB BLD-MCNC: 14.5 G/DL (ref 12–16)
IMM GRANULOCYTES # BLD AUTO: 0.03 K/UL (ref 0–0.11)
IMM GRANULOCYTES # BLD AUTO: 0.04 K/UL (ref 0–0.11)
IMM GRANULOCYTES NFR BLD AUTO: 0.3 % (ref 0–0.9)
IMM GRANULOCYTES NFR BLD AUTO: 0.4 % (ref 0–0.9)
LYMPHOCYTES # BLD AUTO: 1.84 K/UL (ref 1–4.8)
LYMPHOCYTES # BLD AUTO: 1.91 K/UL (ref 1–4.8)
LYMPHOCYTES NFR BLD: 19.2 % (ref 22–41)
LYMPHOCYTES NFR BLD: 19.7 % (ref 22–41)
MCH RBC QN AUTO: 27.9 PG (ref 27–33)
MCH RBC QN AUTO: 28 PG (ref 27–33)
MCHC RBC AUTO-ENTMCNC: 32.5 G/DL (ref 33.6–35)
MCHC RBC AUTO-ENTMCNC: 32.6 G/DL (ref 33.6–35)
MCV RBC AUTO: 85.9 FL (ref 81.4–97.8)
MCV RBC AUTO: 86 FL (ref 81.4–97.8)
MONOCYTES # BLD AUTO: 0.57 K/UL (ref 0–0.85)
MONOCYTES # BLD AUTO: 0.59 K/UL (ref 0–0.85)
MONOCYTES NFR BLD AUTO: 5.9 % (ref 0–13.4)
MONOCYTES NFR BLD AUTO: 6.2 % (ref 0–13.4)
NEUTROPHILS # BLD AUTO: 6.58 K/UL (ref 2–7.15)
NEUTROPHILS # BLD AUTO: 6.59 K/UL (ref 2–7.15)
NEUTROPHILS NFR BLD: 68.1 % (ref 44–72)
NEUTROPHILS NFR BLD: 68.7 % (ref 44–72)
NRBC # BLD AUTO: 0 K/UL
NRBC # BLD AUTO: 0 K/UL
NRBC BLD-RTO: 0 /100 WBC
NRBC BLD-RTO: 0 /100 WBC
PLATELET # BLD AUTO: 320 K/UL (ref 164–446)
PLATELET # BLD AUTO: 328 K/UL (ref 164–446)
PMV BLD AUTO: 9.3 FL (ref 9–12.9)
PMV BLD AUTO: 9.3 FL (ref 9–12.9)
RBC # BLD AUTO: 5.14 M/UL (ref 4.2–5.4)
RBC # BLD AUTO: 5.19 M/UL (ref 4.2–5.4)
WBC # BLD AUTO: 9.6 K/UL (ref 4.8–10.8)
WBC # BLD AUTO: 9.7 K/UL (ref 4.8–10.8)

## 2023-03-07 PROCEDURE — 84443 ASSAY THYROID STIM HORMONE: CPT

## 2023-03-07 PROCEDURE — 36415 COLL VENOUS BLD VENIPUNCTURE: CPT

## 2023-03-07 PROCEDURE — 85025 COMPLETE CBC W/AUTO DIFF WBC: CPT

## 2023-03-07 PROCEDURE — 84146 ASSAY OF PROLACTIN: CPT

## 2023-03-07 PROCEDURE — 85025 COMPLETE CBC W/AUTO DIFF WBC: CPT | Mod: 91

## 2023-03-07 PROCEDURE — 80053 COMPREHEN METABOLIC PANEL: CPT

## 2023-03-07 PROCEDURE — 80061 LIPID PANEL: CPT

## 2023-03-07 PROCEDURE — 80048 BASIC METABOLIC PNL TOTAL CA: CPT

## 2023-03-08 LAB
ALBUMIN SERPL BCP-MCNC: 4.2 G/DL (ref 3.2–4.9)
ALBUMIN/GLOB SERPL: 1.3 G/DL
ALP SERPL-CCNC: 65 U/L (ref 30–99)
ALT SERPL-CCNC: 16 U/L (ref 2–50)
ANION GAP SERPL CALC-SCNC: 10 MMOL/L (ref 7–16)
ANION GAP SERPL CALC-SCNC: 11 MMOL/L (ref 7–16)
AST SERPL-CCNC: 19 U/L (ref 12–45)
BILIRUB SERPL-MCNC: 0.3 MG/DL (ref 0.1–1.5)
BUN SERPL-MCNC: 11 MG/DL (ref 8–22)
BUN SERPL-MCNC: 11 MG/DL (ref 8–22)
CALCIUM ALBUM COR SERPL-MCNC: 9.1 MG/DL (ref 8.5–10.5)
CALCIUM SERPL-MCNC: 9.2 MG/DL (ref 8.5–10.5)
CALCIUM SERPL-MCNC: 9.3 MG/DL (ref 8.5–10.5)
CHLORIDE SERPL-SCNC: 104 MMOL/L (ref 96–112)
CHLORIDE SERPL-SCNC: 106 MMOL/L (ref 96–112)
CHOLEST SERPL-MCNC: 194 MG/DL (ref 100–199)
CO2 SERPL-SCNC: 25 MMOL/L (ref 20–33)
CO2 SERPL-SCNC: 25 MMOL/L (ref 20–33)
CREAT SERPL-MCNC: 0.86 MG/DL (ref 0.5–1.4)
CREAT SERPL-MCNC: 0.87 MG/DL (ref 0.5–1.4)
GFR SERPLBLD CREATININE-BSD FMLA CKD-EPI: 93 ML/MIN/1.73 M 2
GFR SERPLBLD CREATININE-BSD FMLA CKD-EPI: 94 ML/MIN/1.73 M 2
GLOBULIN SER CALC-MCNC: 3.2 G/DL (ref 1.9–3.5)
GLUCOSE SERPL-MCNC: 54 MG/DL (ref 65–99)
GLUCOSE SERPL-MCNC: 58 MG/DL (ref 65–99)
HDLC SERPL-MCNC: 50 MG/DL
LDLC SERPL CALC-MCNC: 121 MG/DL
POTASSIUM SERPL-SCNC: 3.8 MMOL/L (ref 3.6–5.5)
POTASSIUM SERPL-SCNC: 3.9 MMOL/L (ref 3.6–5.5)
PROLACTIN SERPL-MCNC: 44.9 NG/ML (ref 2.8–26)
PROT SERPL-MCNC: 7.4 G/DL (ref 6–8.2)
SODIUM SERPL-SCNC: 140 MMOL/L (ref 135–145)
SODIUM SERPL-SCNC: 141 MMOL/L (ref 135–145)
TRIGL SERPL-MCNC: 117 MG/DL (ref 0–149)
TSH SERPL DL<=0.005 MIU/L-ACNC: 1.12 UIU/ML (ref 0.38–5.33)

## 2023-03-11 ASSESSMENT — ENCOUNTER SYMPTOMS
CONSTIPATION: 0
PALPITATIONS: 0
DEPRESSION: 0
DIARRHEA: 0
ABDOMINAL PAIN: 0
CHILLS: 0
WHEEZING: 0
SHORTNESS OF BREATH: 0
NERVOUS/ANXIOUS: 0
FEVER: 0
NAUSEA: 0
VOMITING: 0

## 2023-03-15 ENCOUNTER — OFFICE VISIT (OUTPATIENT)
Dept: MEDICAL GROUP | Facility: LAB | Age: 28
End: 2023-03-15
Payer: COMMERCIAL

## 2023-03-15 VITALS
SYSTOLIC BLOOD PRESSURE: 102 MMHG | DIASTOLIC BLOOD PRESSURE: 60 MMHG | HEART RATE: 73 BPM | RESPIRATION RATE: 12 BRPM | BODY MASS INDEX: 31.23 KG/M2 | WEIGHT: 199 LBS | HEIGHT: 67 IN | TEMPERATURE: 97.1 F | OXYGEN SATURATION: 99 %

## 2023-03-15 DIAGNOSIS — R53.83 OTHER FATIGUE: ICD-10-CM

## 2023-03-15 DIAGNOSIS — E22.1 HYPERPROLACTINEMIA (HCC): ICD-10-CM

## 2023-03-15 DIAGNOSIS — R76.8 POSITIVE ANA (ANTINUCLEAR ANTIBODY): ICD-10-CM

## 2023-03-15 PROCEDURE — 99214 OFFICE O/P EST MOD 30 MIN: CPT | Performed by: FAMILY MEDICINE

## 2023-03-15 RX ORDER — ATOMOXETINE 60 MG/1
CAPSULE ORAL
COMMUNITY
Start: 2023-03-11 | End: 2023-09-01

## 2023-03-15 RX ORDER — HYDROCODONE BITARTRATE AND ACETAMINOPHEN 5; 325 MG/1; MG/1
TABLET ORAL
COMMUNITY
End: 2023-05-11

## 2023-03-15 ASSESSMENT — FIBROSIS 4 INDEX: FIB4 SCORE: 0.42

## 2023-03-15 NOTE — PROGRESS NOTES
Subjective:   Shy Buchanan is a 27 y.o. female here today for   Chief Complaint   Patient presents with    Lab Results     #Elevated prolactin level:  -Patient here to follow-up on labs which shows a markedly elevated prolactin level.  She states that she has had elevated prolactin levels in the past although no further work-up was done past that.  Patient has been experiencing significant fatigue.  She has a longstanding history of chronic conditions including significant fatigue, polyarthritis noninflammatory, frequent facial rashes that occur more commonly when exposed to sunlight..  Other work-up the symptoms has been completed including SCOTT which was positive with a titer of 1:160 with a speckled pattern.  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  -Patient denies any fevers, chills, dizziness, lightheadedness, palpitations, blood in urine, dysuria, back pain.      Allergies   Allergen Reactions    Hydrocodone-Acetaminophen Nausea    Vicodin [Hydrocodone-Acetaminophen] Vomiting    Contrast Media With Iodine [Iodine] Hives and Itching    Latex Hives, Itching and Rash    Lidocaine Hives         Current medicines (including changes today)  Current Outpatient Medications   Medication Sig Dispense Refill    atomoxetine (STRATTERA) 60 MG capsule       HYDROcodone-acetaminophen (NORCO) 5-325 MG Tab per tablet Norco 5 mg-325 mg tablet   Take 1 tablet every 6 hours by oral route as needed for 3 days. (Patient not taking: Reported on 3/15/2023)      traZODone (DESYREL) 50 MG Tab as needed.      montelukast (SINGULAIR) 10 MG Tab Take 1 Tablet by mouth every day for 360 days. 90 Tablet 3    atomoxetine (STRATTERA) 40 MG capsule Take 40 mg by mouth every day.      Cariprazine HCl 1.5 MG Cap Take 1 Capsule by mouth every day.      ondansetron (ZOFRAN ODT) 4 MG TABLET DISPERSIBLE Take 1 Tablet by mouth every 8 hours as needed for  "Nausea/Vomiting. 10 Tablet 0    lamoTRIgine (LAMICTAL) 100 MG Tab Take 1 Tablet by mouth every day. 30 Tablet 6    albuterol (PROVENTIL) 2.5mg/3ml Nebu Soln solution for nebulization Take 3 mL by nebulization every four hours as needed for Shortness of Breath. 1 Each 2    ibuprofen (MOTRIN) 800 MG Tab Take 1 Tablet by mouth every 8 hours as needed for Moderate Pain. 90 Tablet 0    folic acid (FOLVITE) 1 MG Tab Take 2 Tablets by mouth every day. 60 Tablet 11    mometasone (ELOCON) 0.1 % Cream Apply 1 g topically as needed (For rash on hip).      Azelastine HCl 137 MCG/SPRAY Solution Administer 1 split tablet into affected nostril(S) at bedtime.      fluticasone (FLONASE) 50 MCG/ACT nasal spray Administer 1 Spray into affected nostril(S) at bedtime.       No current facility-administered medications for this visit.     She  has a past medical history of Epilepsy ([facility]), Gynecological disorder, Headache (12/31/2014), Healthy adult, Heart burn, Hypertension, Indigestion, Lupus ([facility]), PONV (postoperative nausea and vomiting), and Rash (3/21/2019).    She has no past medical history of Hyperlipidemia.    ROS   -See HPI       Objective:     Physical Exam:  /60 (BP Location: Right arm, Patient Position: Sitting, BP Cuff Size: Adult)   Pulse 73   Temp 36.2 °C (97.1 °F)   Resp 12   Ht 1.702 m (5' 7\")   Wt 90.3 kg (199 lb)   SpO2 99%  Body mass index is 31.17 kg/m².   Constitutional: Alert, no distress.  Skin: Warm, dry, good turgor, no rashes in visible areas.  Eye: Equal, round and reactive, conjunctiva clear, lids normal.  ENMT: TM's clear bilaterally, lips without lesions, good dentition, oropharynx clear.  Neck: Trachea midline, no masses, no thyromegaly. No cervical or supraclavicular lymphadenopathy.  Respiratory: Unlabored respiratory effort, lungs clear to auscultation, no wheezes, no rhonchi.  Cardiovascular: Normal S1, S2, no murmur, no edema.  Abdomen: Soft, non-tender, no masses, no " hepatosplenomegaly.  Psych: Alert and oriented x3, normal affect and mood.    Assessment and Plan:     1. Hyperprolactinemia (HCC)  -Given patient's concerns regarding fatigue and other symptoms I am concerned about complications from hyperprolactinemia.  At this time further evaluation is needed we will order MR pituitary to rule out adenoma afterwards we will begin further treatment for hyperprolactinemia.  - MR-BRAIN PITUITARY W/WO; Future    2. Other fatigue  -Given patient's history as well as findings on labs including positive SCOTT further evaluation is needed to rule out any other causes including other rheumatologic diseases.  We will order lab test as below.  Patient will continue to watch for significant signs of inflammatory disease such as new or worsening rashes.  We will follow-up for symptom check as well as review labs in 2 to 3 months.  - RHEUMATOID ARTHRITIS FACTOR; Future  - CCP ANTIBODY; Future  - COMPLEMENT C3; Future  - COMPLEMENT C4; Future    3. Positive SCOTT (antinuclear antibody)  -See #2  - RHEUMATOID ARTHRITIS FACTOR; Future  - CCP ANTIBODY; Future  - COMPLEMENT C3; Future  - COMPLEMENT C4; Future        Followup: No follow-ups on file.         PLEASE NOTE: This dictation was created using voice recognition software. I have made every reasonable attempt to correct obvious errors, but I expect that there are errors of grammar and possibly content that I did not discover before finalizing the note.

## 2023-04-12 ENCOUNTER — HOSPITAL ENCOUNTER (OUTPATIENT)
Dept: LAB | Facility: MEDICAL CENTER | Age: 28
End: 2023-04-12
Attending: FAMILY MEDICINE
Payer: COMMERCIAL

## 2023-04-12 DIAGNOSIS — R53.83 OTHER FATIGUE: ICD-10-CM

## 2023-04-12 DIAGNOSIS — R76.8 POSITIVE ANA (ANTINUCLEAR ANTIBODY): ICD-10-CM

## 2023-04-12 PROCEDURE — 86431 RHEUMATOID FACTOR QUANT: CPT

## 2023-04-12 PROCEDURE — 86160 COMPLEMENT ANTIGEN: CPT

## 2023-04-12 PROCEDURE — 36415 COLL VENOUS BLD VENIPUNCTURE: CPT

## 2023-04-12 PROCEDURE — 86200 CCP ANTIBODY: CPT

## 2023-04-13 LAB
C3 SERPL-MCNC: 117.4 MG/DL (ref 87–200)
C4 SERPL-MCNC: 24.8 MG/DL (ref 19–52)
RHEUMATOID FACT SER IA-ACNC: <10 IU/ML (ref 0–14)

## 2023-04-14 LAB — CCP IGG SERPL-ACNC: 2 UNITS (ref 0–19)

## 2023-05-12 ENCOUNTER — APPOINTMENT (RX ONLY)
Dept: URBAN - METROPOLITAN AREA CLINIC 4 | Facility: CLINIC | Age: 28
Setting detail: DERMATOLOGY
End: 2023-05-12

## 2023-05-12 DIAGNOSIS — L70.0 ACNE VULGARIS: ICD-10-CM

## 2023-05-12 PROCEDURE — ? MEDICATION COUNSELING

## 2023-05-12 PROCEDURE — ? PRESCRIPTION SAMPLES PROVIDED

## 2023-05-12 PROCEDURE — ? PRESCRIPTION

## 2023-05-12 PROCEDURE — 99213 OFFICE O/P EST LOW 20 MIN: CPT

## 2023-05-12 PROCEDURE — ? COUNSELING

## 2023-05-12 RX ORDER — CLASCOTERONE 1 G/100G
CREAM TOPICAL BID
Qty: 60 | Refills: 2 | Status: ERX | COMMUNITY
Start: 2023-05-12

## 2023-05-12 RX ORDER — DOXYCYCLINE HYCLATE 100 MG/1
CAPSULE, GELATIN COATED ORAL BID
Qty: 60 | Refills: 3 | Status: ERX | COMMUNITY
Start: 2023-05-12

## 2023-05-12 RX ADMIN — DOXYCYCLINE HYCLATE: 100 CAPSULE, GELATIN COATED ORAL at 00:00

## 2023-05-12 RX ADMIN — CLASCOTERONE: 1 CREAM TOPICAL at 00:00

## 2023-05-12 ASSESSMENT — LOCATION DETAILED DESCRIPTION DERM
LOCATION DETAILED: RIGHT BUTTOCK
LOCATION DETAILED: SUPERIOR THORACIC SPINE
LOCATION DETAILED: LEFT BUTTOCK
LOCATION DETAILED: LEFT INFERIOR CENTRAL MALAR CHEEK
LOCATION DETAILED: RIGHT INFERIOR CENTRAL MALAR CHEEK

## 2023-05-12 ASSESSMENT — LOCATION ZONE DERM
LOCATION ZONE: TRUNK
LOCATION ZONE: FACE

## 2023-05-12 ASSESSMENT — LOCATION SIMPLE DESCRIPTION DERM
LOCATION SIMPLE: RIGHT BUTTOCK
LOCATION SIMPLE: RIGHT CHEEK
LOCATION SIMPLE: UPPER BACK
LOCATION SIMPLE: LEFT BUTTOCK
LOCATION SIMPLE: LEFT CHEEK

## 2023-05-12 NOTE — PROCEDURE: MEDICATION COUNSELING
Opioid Counseling: I discussed with the patient the potential side effects of opioids including but not limited to addiction, altered mental status, and depression. I stressed avoiding alcohol, benzodiazepines, muscle relaxants and sleep aids unless specifically okayed by a physician. The patient verbalized understanding of the proper use and possible adverse effects of opioids. All of the patient's questions and concerns were addressed. They were instructed to flush the remaining pills down the toilet if they did not need them for pain.
Itraconazole Pregnancy And Lactation Text: This medication is Pregnancy Category C and it isn't know if it is safe during pregnancy. It is also excreted in breast milk.
Minoxidil Counseling: Minoxidil is a topical medication which can increase blood flow where it is applied. It is uncertain how this medication increases hair growth. Side effects are uncommon and include stinging and allergic reactions.
Otezla Pregnancy And Lactation Text: This medication is Pregnancy Category C and it isn't known if it is safe during pregnancy. It is unknown if it is excreted in breast milk.
Erythromycin Pregnancy And Lactation Text: This medication is Pregnancy Category B and is considered safe during pregnancy. It is also excreted in breast milk.
Isotretinoin Pregnancy And Lactation Text: This medication is Pregnancy Category X and is considered extremely dangerous during pregnancy. It is unknown if it is excreted in breast milk.
Taltz Pregnancy And Lactation Text: The risk during pregnancy and breastfeeding is uncertain with this medication.
Niacinamide Counseling: I recommended taking niacin or niacinamide, also know as vitamin B3, twice daily. Recent evidence suggests that taking vitamin B3 (500 mg twice daily) can reduce the risk of actinic keratoses and non-melanoma skin cancers. Side effects of vitamin B3 include flushing and headache.
Topical Ketoconazole Pregnancy And Lactation Text: This medication is Pregnancy Category B and is considered safe during pregnancy. It is unknown if it is excreted in breast milk.
Wartpeel Pregnancy And Lactation Text: This medication is Pregnancy Category X and contraindicated in pregnancy and in women who may become pregnant. It is unknown if this medication is excreted in breast milk.
Topical Retinoid counseling:  Patient advised to apply a pea-sized amount only at bedtime and wait 30 minutes after washing their face before applying.  If too drying, patient may add a non-comedogenic moisturizer. The patient verbalized understanding of the proper use and possible adverse effects of retinoids.  All of the patient's questions and concerns were addressed.
Cyclosporine Pregnancy And Lactation Text: This medication is Pregnancy Category C and it isn't know if it is safe during pregnancy. This medication is excreted in breast milk.
Sotyktu Counseling:  I discussed the most common side effects of Sotyktu including: common cold, sore throat, sinus infections, cold sores, canker sores, folliculitis, and acne.? I also discussed more serious side effects of Sotyktu including but not limited to: serious allergic reactions; increased risk for infections such as TB; cancers such as lymphomas; rhabdomyolysis and elevated CPK; and elevated triglycerides and liver enzymes.?
Protopic Pregnancy And Lactation Text: This medication is Pregnancy Category C. It is unknown if this medication is excreted in breast milk when applied topically.
Spironolactone Counseling: Patient advised regarding risks of diarrhea, abdominal pain, hyperkalemia, birth defects (for female patients), liver toxicity and renal toxicity. The patient may need blood work to monitor liver and kidney function and potassium levels while on therapy. The patient verbalized understanding of the proper use and possible adverse effects of spironolactone.  All of the patient's questions and concerns were addressed.
Tranexamic Acid Pregnancy And Lactation Text: It is unknown if this medication is safe during pregnancy or breast feeding.
Enbrel Pregnancy And Lactation Text: This medication is Pregnancy Category B and is considered safe during pregnancy. It is unknown if this medication is excreted in breast milk.
Odomzo Counseling- I discussed with the patient the risks of Odomzo including but not limited to nausea, vomiting, diarrhea, constipation, weight loss, changes in the sense of taste, decreased appetite, muscle spasms, and hair loss.  The patient verbalized understanding of the proper use and possible adverse effects of Odomzo.  All of the patient's questions and concerns were addressed.
Sarecycline Pregnancy And Lactation Text: This medication is Pregnancy Category D and not consider safe during pregnancy. It is also excreted in breast milk.
Adbry Counseling: I discussed with the patient the risks of tralokinumab including but not limited to eye infection and irritation, cold sores, injection site reactions, worsening of asthma, allergic reactions and increased risk of parasitic infection.  Live vaccines should be avoided while taking tralokinumab. The patient understands that monitoring is required and they must alert us or the primary physician if symptoms of infection or other concerning signs are noted.
Topical Metronidazole Counseling: Metronidazole is a topical antibiotic medication. You may experience burning, stinging, redness, or allergic reactions.  Please call our office if you develop any problems from using this medication.
Topical Retinoid Pregnancy And Lactation Text: This medication is Pregnancy Category C. It is unknown if this medication is excreted in breast milk.
Sotyktu Pregnancy And Lactation Text: There is insufficient data to evaluate whether or not Sotyktu is safe to use during pregnancy.? ?It is not known if Sotyktu passes into breast milk and whether or not it is safe to use when breastfeeding.??
Winlevi Counseling:  I discussed with the patient the risks of topical clascoterone including but not limited to erythema, scaling, itching, and stinging. Patient voiced their understanding.
Dapsone Pregnancy And Lactation Text: This medication is Pregnancy Category C and is not considered safe during pregnancy or breast feeding.
Bactrim Counseling:  I discussed with the patient the risks of sulfa antibiotics including but not limited to GI upset, allergic reaction, drug rash, diarrhea, dizziness, photosensitivity, and yeast infections.  Rarely, more serious reactions can occur including but not limited to aplastic anemia, agranulocytosis, methemoglobinemia, blood dyscrasias, liver or kidney failure, lung infiltrates or desquamative/blistering drug rashes.
Hydroxyzine Counseling: Patient advised that the medication is sedating and not to drive a car after taking this medication.  Patient informed of potential adverse effects including but not limited to dry mouth, urinary retention, and blurry vision.  The patient verbalized understanding of the proper use and possible adverse effects of hydroxyzine.  All of the patient's questions and concerns were addressed.
Carac Counseling:  I discussed with the patient the risks of Carac including but not limited to erythema, scaling, itching, weeping, crusting, and pain.
Siliq Counseling:  I discussed with the patient the risks of Siliq including but not limited to new or worsening depression, suicidal thoughts and behavior, immunosuppression, malignancy, posterior leukoencephalopathy syndrome, and serious infections.  The patient understands that monitoring is required including a PPD at baseline and must alert us or the primary physician if symptoms of infection or other concerning signs are noted. There is also a special program designed to monitor depression which is required with Siliq.
Hydroxyzine Pregnancy And Lactation Text: This medication is not safe during pregnancy and should not be taken. It is also excreted in breast milk and breast feeding isn't recommended.
Tremfya Counseling: I discussed with the patient the risks of guselkumab including but not limited to immunosuppression, serious infections, worsening of inflammatory bowel disease and drug reactions.  The patient understands that monitoring is required including a PPD at baseline and must alert us or the primary physician if symptoms of infection or other concerning signs are noted.
Niacinamide Pregnancy And Lactation Text: These medications are considered safe during pregnancy.
Bactrim Pregnancy And Lactation Text: This medication is Pregnancy Category D and is known to cause fetal risk.  It is also excreted in breast milk.
Gabapentin Counseling: I discussed with the patient the risks of gabapentin including but not limited to dizziness, somnolence, fatigue and ataxia.
Minoxidil Pregnancy And Lactation Text: This medication has not been assigned a Pregnancy Risk Category but animal studies failed to show danger with the topical medication. It is unknown if the medication is excreted in breast milk.
Ketoconazole Counseling:   Patient counseled regarding improving absorption with orange juice.  Adverse effects include but are not limited to breast enlargement, headache, diarrhea, nausea, upset stomach, liver function test abnormalities, taste disturbance, and stomach pain.  There is a rare possibility of liver failure that can occur when taking ketoconazole. The patient understands that monitoring of LFTs may be required, especially at baseline. The patient verbalized understanding of the proper use and possible adverse effects of ketoconazole.  All of the patient's questions and concerns were addressed.
Methotrexate Counseling:  Patient counseled regarding adverse effects of methotrexate including but not limited to nausea, vomiting, abnormalities in liver function tests. Patients may develop mouth sores, rash, diarrhea, and abnormalities in blood counts. The patient understands that monitoring is required including LFT's and blood counts.  There is a rare possibility of scarring of the liver and lung problems that can occur when taking methotrexate. Persistent nausea, loss of appetite, pale stools, dark urine, cough, and shortness of breath should be reported immediately. Patient advised to discontinue methotrexate treatment at least three months before attempting to become pregnant.  I discussed the need for folate supplements while taking methotrexate.  These supplements can decrease side effects during methotrexate treatment. The patient verbalized understanding of the proper use and possible adverse effects of methotrexate.  All of the patient's questions and concerns were addressed.
Qbrexza Counseling:  I discussed with the patient the risks of Qbrexza including but not limited to headache, mydriasis, blurred vision, dry eyes, nasal dryness, dry mouth, dry throat, dry skin, urinary hesitation, and constipation.  Local skin reactions including erythema, burning, stinging, and itching can also occur.
Spironolactone Pregnancy And Lactation Text: This medication can cause feminization of the male fetus and should be avoided during pregnancy. The active metabolite is also found in breast milk.
Metronidazole Counseling:  I discussed with the patient the risks of metronidazole including but not limited to seizures, nausea/vomiting, a metallic taste in the mouth, nausea/vomiting and severe allergy.
Opioid Pregnancy And Lactation Text: These medications can lead to premature delivery and should be avoided during pregnancy. These medications are also present in breast milk in small amounts.
High Dose Vitamin A Counseling: Side effects reviewed, pt to contact office should one occur.
Oxybutynin Counseling:  I discussed with the patient the risks of oxybutynin including but not limited to skin rash, drowsiness, dry mouth, difficulty urinating, and blurred vision.
Humira Counseling:  I discussed with the patient the risks of adalimumab including but not limited to myelosuppression, immunosuppression, autoimmune hepatitis, demyelinating diseases, lymphoma, and serious infections.  The patient understands that monitoring is required including a PPD at baseline and must alert us or the primary physician if symptoms of infection or other concerning signs are noted.
Topical Metronidazole Pregnancy And Lactation Text: This medication is Pregnancy Category B and considered safe during pregnancy.  It is also considered safe to use while breastfeeding.
Valtrex Counseling: I discussed with the patient the risks of valacyclovir including but not limited to kidney damage, nausea, vomiting and severe allergy.  The patient understands that if the infection seems to be worsening or is not improving, they are to call.
Winlevi Pregnancy And Lactation Text: This medication is considered safe during pregnancy and breastfeeding.
Tetracycline Counseling: Patient counseled regarding possible photosensitivity and increased risk for sunburn.  Patient instructed to avoid sunlight, if possible.  When exposed to sunlight, patients should wear protective clothing, sunglasses, and sunscreen.  The patient was instructed to call the office immediately if the following severe adverse effects occur:  hearing changes, easy bruising/bleeding, severe headache, or vision changes.  The patient verbalized understanding of the proper use and possible adverse effects of tetracycline.  All of the patient's questions and concerns were addressed. Patient understands to avoid pregnancy while on therapy due to potential birth defects.
Adbry Pregnancy And Lactation Text: It is unknown if this medication will adversely affect pregnancy or breast feeding.
Ketoconazole Pregnancy And Lactation Text: This medication is Pregnancy Category C and it isn't know if it is safe during pregnancy. It is also excreted in breast milk and breast feeding isn't recommended.
Eucrisa Counseling: Patient may experience a mild burning sensation during topical application. Eucrisa is not approved in children less than 2 years of age.
Odomzo Pregnancy And Lactation Text: This medication is Pregnancy Category X and is absolutely contraindicated during pregnancy. It is unknown if it is excreted in breast milk.
Azathioprine Counseling:  I discussed with the patient the risks of azathioprine including but not limited to myelosuppression, immunosuppression, hepatotoxicity, lymphoma, and infections.  The patient understands that monitoring is required including baseline LFTs, Creatinine, possible TPMP genotyping and weekly CBCs for the first month and then every 2 weeks thereafter.  The patient verbalized understanding of the proper use and possible adverse effects of azathioprine.  All of the patient's questions and concerns were addressed.
Gabapentin Pregnancy And Lactation Text: This medication is Pregnancy Category C and isn't considered safe during pregnancy. It is excreted in breast milk.
Arava Counseling:  Patient counseled regarding adverse effects of Arava including but not limited to nausea, vomiting, abnormalities in liver function tests. Patients may develop mouth sores, rash, diarrhea, and abnormalities in blood counts. The patient understands that monitoring is required including LFTs and blood counts.  There is a rare possibility of scarring of the liver and lung problems that can occur when taking methotrexate. Persistent nausea, loss of appetite, pale stools, dark urine, cough, and shortness of breath should be reported immediately. Patient advised to discontinue Arava treatment and consult with a physician prior to attempting conception. The patient will have to undergo a treatment to eliminate Arava from the body prior to conception.
Nsaids Counseling: NSAID Counseling: I discussed with the patient that NSAIDs should be taken with food. Prolonged use of NSAIDs can result in the development of stomach ulcers.  Patient advised to stop taking NSAIDs if abdominal pain occurs.  The patient verbalized understanding of the proper use and possible adverse effects of NSAIDs.  All of the patient's questions and concerns were addressed.
Methotrexate Pregnancy And Lactation Text: This medication is Pregnancy Category X and is known to cause fetal harm. This medication is excreted in breast milk.
Xeljanz Counseling: I discussed with the patient the risks of Xeljanz therapy including increased risk of infection, liver issues, headache, diarrhea, or cold symptoms. Live vaccines should be avoided. They were instructed to call if they have any problems.
Metronidazole Pregnancy And Lactation Text: This medication is Pregnancy Category B and considered safe during pregnancy.  It is also excreted in breast milk.
High Dose Vitamin A Pregnancy And Lactation Text: High dose vitamin A therapy is contraindicated during pregnancy and breast feeding.
Qbrexza Pregnancy And Lactation Text: There is no available data on Qbrexza use in pregnant women.  There is no available data on Qbrexza use in lactation.
Cephalexin Counseling: I counseled the patient regarding use of cephalexin as an antibiotic for prophylactic and/or therapeutic purposes. Cephalexin (commonly prescribed under brand name Keflex) is a cephalosporin antibiotic which is active against numerous classes of bacteria, including most skin bacteria. Side effects may include nausea, diarrhea, gastrointestinal upset, rash, hives, yeast infections, and in rare cases, hepatitis, kidney disease, seizures, fever, confusion, neurologic symptoms, and others. Patients with severe allergies to penicillin medications are cautioned that there is about a 10% incidence of cross-reactivity with cephalosporins. When possible, patients with penicillin allergies should use alternatives to cephalosporins for antibiotic therapy.
Mirvaso Counseling: Mirvaso is a topical medication which can decrease superficial blood flow where applied. Side effects are uncommon and include stinging, redness and allergic reactions.
Propranolol Counseling:  I discussed with the patient the risks of propranolol including but not limited to low heart rate, low blood pressure, low blood sugar, restlessness and increased cold sensitivity. They should call the office if they experience any of these side effects.
Minocycline Counseling: Patient advised regarding possible photosensitivity and discoloration of the teeth, skin, lips, tongue and gums.  Patient instructed to avoid sunlight, if possible.  When exposed to sunlight, patients should wear protective clothing, sunglasses, and sunscreen.  The patient was instructed to call the office immediately if the following severe adverse effects occur:  hearing changes, easy bruising/bleeding, severe headache, or vision changes.  The patient verbalized understanding of the proper use and possible adverse effects of minocycline.  All of the patient's questions and concerns were addressed.
Xelrebeccaz Pregnancy And Lactation Text: This medication is Pregnancy Category D and is not considered safe during pregnancy.  The risk during breast feeding is also uncertain.
Topical Steroids Counseling: I discussed with the patient that prolonged use of topical steroids can result in the increased appearance of superficial blood vessels (telangiectasias), lightening (hypopigmentation) and thinning of the skin (atrophy).  Patient understands to avoid using high potency steroids in skin folds, the groin or the face.  The patient verbalized understanding of the proper use and possible adverse effects of topical steroids.  All of the patient's questions and concerns were addressed.
Xolair Counseling:  Patient informed of potential adverse effects including but not limited to fever, muscle aches, rash and allergic reactions.  The patient verbalized understanding of the proper use and possible adverse effects of Xolair.  All of the patient's questions and concerns were addressed.
Prednisone Counseling:  I discussed with the patient the risks of prolonged use of prednisone including but not limited to weight gain, insomnia, osteoporosis, mood changes, diabetes, susceptibility to infection, glaucoma and high blood pressure.  In cases where prednisone use is prolonged, patients should be monitored with blood pressure checks, serum glucose levels and an eye exam.  Additionally, the patient may need to be placed on GI prophylaxis, PCP prophylaxis, and calcium and vitamin D supplementation and/or a bisphosphonate.  The patient verbalized understanding of the proper use and the possible adverse effects of prednisone.  All of the patient's questions and concerns were addressed.
Rhofade Counseling: Rhofade is a topical medication which can decrease superficial blood flow where applied. Side effects are uncommon and include stinging, redness and allergic reactions.
Cibinqo Counseling: I discussed with the patient the risks of Cibinqo therapy including but not limited to common cold, nausea, headache, cold sores, increased blood CPK levels, dizziness, UTIs, fatigue, acne, and vomitting. Live vaccines should be avoided.  This medication has been linked to serious infections; higher rate of mortality; malignancy and lymphoproliferative disorders; major adverse cardiovascular events; thrombosis; thrombocytopenia and lymphopenia; lipid elevations; and retinal detachment.
Calcipotriene Pregnancy And Lactation Text: The use of this medication during pregnancy or lactation is not recommended as there is insufficient data.
Terbinafine Counseling: Patient counseling regarding adverse effects of terbinafine including but not limited to headache, diarrhea, rash, upset stomach, liver function test abnormalities, itching, taste/smell disturbance, nausea, abdominal pain, and flatulence.  There is a rare possibility of liver failure that can occur when taking terbinafine.  The patient understands that a baseline LFT and kidney function test may be required. The patient verbalized understanding of the proper use and possible adverse effects of terbinafine.  All of the patient's questions and concerns were addressed.
VTAMA Counseling: I discussed with the patient that VTAMA is not for use in the eyes, mouth or mouth. They should call the office if they develop any signs of allergic reactions to VTAMA. The patient verbalized understanding of the proper use and possible adverse effects of VTAMA.  All of the patient's questions and concerns were addressed.
Cimzia Counseling:  I discussed with the patient the risks of Cimzia including but not limited to immunosuppression, allergic reactions and infections.  The patient understands that monitoring is required including a PPD at baseline and must alert us or the primary physician if symptoms of infection or other concerning signs are noted.
Calcipotriene Counseling:  I discussed with the patient the risks of calcipotriene including but not limited to erythema, scaling, itching, and irritation.
Simponi Counseling:  I discussed with the patient the risks of golimumab including but not limited to myelosuppression, immunosuppression, autoimmune hepatitis, demyelinating diseases, lymphoma, and serious infections.  The patient understands that monitoring is required including a PPD at baseline and must alert us or the primary physician if symptoms of infection or other concerning signs are noted.
Detail Level: Zone
Valtrex Pregnancy And Lactation Text: this medication is Pregnancy Category B and is considered safe during pregnancy. This medication is not directly found in breast milk but it's metabolite acyclovir is present.
Cantharidin Counseling:  I discussed with the patient the risks of Cantharidin including but not limited to pain, redness, burning, itching, and blistering.
Glycopyrrolate Counseling:  I discussed with the patient the risks of glycopyrrolate including but not limited to skin rash, drowsiness, dry mouth, difficulty urinating, and blurred vision.
Use Enhanced Medication Counseling?: No
Ilumya Counseling: I discussed with the patient the risks of tildrakizumab including but not limited to immunosuppression, malignancy, posterior leukoencephalopathy syndrome, and serious infections.  The patient understands that monitoring is required including a PPD at baseline and must alert us or the primary physician if symptoms of infection or other concerning signs are noted.
Vtama Pregnancy And Lactation Text: It is unknown if this medication can cause problems during pregnancy and breastfeeding.
Mirvaso Pregnancy And Lactation Text: This medication has not been assigned a Pregnancy Risk Category. It is unknown if the medication is excreted in breast milk.
Azathioprine Pregnancy And Lactation Text: This medication is Pregnancy Category D and isn't considered safe during pregnancy. It is unknown if this medication is excreted in breast milk.
Dutasteride Male Counseling: Dustasteride Counseling:  I discussed with the patient the risks of use of dutasteride including but not limited to decreased libido, decreased ejaculate volume, and gynecomastia. Women who can become pregnant should not handle medication.  All of the patient's questions and concerns were addressed.
Cephalexin Pregnancy And Lactation Text: This medication is Pregnancy Category B and considered safe during pregnancy.  It is also excreted in breast milk but can be used safely for shorter doses.
Hydroquinone Counseling:  Patient advised that medication may result in skin irritation, lightening (hypopigmentation), dryness, and burning.  In the event of skin irritation, the patient was advised to reduce the amount of the drug applied or use it less frequently.  Rarely, spots that are treated with hydroquinone can become darker (pseudoochronosis).  Should this occur, patient instructed to stop medication and call the office. The patient verbalized understanding of the proper use and possible adverse effects of hydroquinone.  All of the patient's questions and concerns were addressed.
Nsaids Pregnancy And Lactation Text: These medications are considered safe up to 30 weeks gestation. It is excreted in breast milk.
Dutasteride Pregnancy And Lactation Text: This medication is absolutely contraindicated in women, especially during pregnancy and breast feeding. Feminization of male fetuses is possible if taking while pregnant.
Cantharidin Pregnancy And Lactation Text: This medication has not been proven safe during pregnancy. It is unknown if this medication is excreted in breast milk.
Opzelura Counseling:  I discussed with the patient the risks of Opzelura including but not limited to nasopharngitis, bronchitis, ear infection, eosinophila, hives, diarrhea, folliculitis, tonsillitis, and rhinorrhea.  Taken orally, this medication has been linked to serious infections; higher rate of mortality; malignancy and lymphoproliferative disorders; major adverse cardiovascular events; thrombosis; thrombocytopenia, anemia, and neutropenia; and lipid elevations.
Terbinafine Pregnancy And Lactation Text: This medication is Pregnancy Category B and is considered safe during pregnancy. It is also excreted in breast milk and breast feeding isn't recommended.
Cibinqo Pregnancy And Lactation Text: It is unknown if this medication will adversely affect pregnancy or breast feeding.  You should not take this medication if you are currently pregnant or planning a pregnancy or while breastfeeding.
Tazorac Counseling:  Patient advised that medication is irritating and drying.  Patient may need to apply sparingly and wash off after an hour before eventually leaving it on overnight.  The patient verbalized understanding of the proper use and possible adverse effects of tazorac.  All of the patient's questions and concerns were addressed.
Fluconazole Counseling:  Patient counseled regarding adverse effects of fluconazole including but not limited to headache, diarrhea, nausea, upset stomach, liver function test abnormalities, taste disturbance, and stomach pain.  There is a rare possibility of liver failure that can occur when taking fluconazole.  The patient understands that monitoring of LFTs and kidney function test may be required, especially at baseline. The patient verbalized understanding of the proper use and possible adverse effects of fluconazole.  All of the patient's questions and concerns were addressed.
Olanzapine Counseling- I discussed with the patient the common side effects of olanzapine including but are not limited to: lack of energy, dry mouth, increased appetite, sleepiness, tremor, constipation, dizziness, changes in behavior, or restlessness.  Explained that teenagers are more likely to experience headaches, abdominal pain, pain in the arms or legs, tiredness, and sleepiness.  Serious side effects include but are not limited: increased risk of death in elderly patients who are confused, have memory loss, or dementia-related psychosis; hyperglycemia; increased cholesterol and triglycerides; and weight gain.
Cellcept Counseling:  I discussed with the patient the risks of mycophenolate mofetil including but not limited to infection/immunosuppression, GI upset, hypokalemia, hypercholesterolemia, bone marrow suppression, lymphoproliferative disorders, malignancy, GI ulceration/bleed/perforation, colitis, interstitial lung disease, kidney failure, progressive multifocal leukoencephalopathy, and birth defects.  The patient understands that monitoring is required including a baseline creatinine and regular CBC testing. In addition, patient must alert us immediately if symptoms of infection or other concerning signs are noted.
Propranolol Pregnancy And Lactation Text: This medication is Pregnancy Category C and it isn't known if it is safe during pregnancy. It is excreted in breast milk.
Aklief counseling:  Patient advised to apply a pea-sized amount only at bedtime and wait 30 minutes after washing their face before applying.  If too drying, patient may add a non-comedogenic moisturizer.  The most commonly reported side effects including irritation, redness, scaling, dryness, stinging, burning, itching, and increased risk of sunburn.  The patient verbalized understanding of the proper use and possible adverse effects of retinoids.  All of the patient's questions and concerns were addressed.
Topical Steroids Applications Pregnancy And Lactation Text: Most topical steroids are considered safe to use during pregnancy and lactation.  Any topical steroid applied to the breast or nipple should be washed off before breastfeeding.
Cimzia Pregnancy And Lactation Text: This medication crosses the placenta but can be considered safe in certain situations. Cimzia may be excreted in breast milk.
Clofazimine Counseling:  I discussed with the patient the risks of clofazimine including but not limited to skin and eye pigmentation, liver damage, nausea/vomiting, gastrointestinal bleeding and allergy.
Aklief Pregnancy And Lactation Text: It is unknown if this medication is safe to use during pregnancy.  It is unknown if this medication is excreted in breast milk.  Breastfeeding women should use the topical cream on the smallest area of the skin for the shortest time needed while breastfeeding.  Do not apply to nipple and areola.
Topical Sulfur Applications Counseling: Topical Sulfur Counseling: Patient counseled that this medication may cause skin irritation or allergic reactions.  In the event of skin irritation, the patient was advised to reduce the amount of the drug applied or use it less frequently.   The patient verbalized understanding of the proper use and possible adverse effects of topical sulfur application.  All of the patient's questions and concerns were addressed.
Zoryve Counseling:  I discussed with the patient that Zoryve is not for use in the eyes, mouth or vagina. The most commonly reported side effects include diarrhea, headache, insomnia, application site pain, upper respiratory tract infections, and urinary tract infections.  All of the patient's questions and concerns were addressed.
Cosentyx Counseling:  I discussed with the patient the risks of Cosentyx including but not limited to worsening of Crohn's disease, immunosuppression, allergic reactions and infections.  The patient understands that monitoring is required including a PPD at baseline and must alert us or the primary physician if symptoms of infection or other concerning signs are noted.
5-Fu Counseling: 5-Fluorouracil Counseling:  I discussed with the patient the risks of 5-fluorouracil including but not limited to erythema, scaling, itching, weeping, crusting, and pain.
SSKI Counseling:  I discussed with the patient the risks of SSKI including but not limited to thyroid abnormalities, metallic taste, GI upset, fever, headache, acne, arthralgias, paraesthesias, lymphadenopathy, easy bleeding, arrhythmias, and allergic reaction.
Quinolones Counseling:  I discussed with the patient the risks of fluoroquinolones including but not limited to GI upset, allergic reaction, drug rash, diarrhea, dizziness, photosensitivity, yeast infections, liver function test abnormalities, tendonitis/tendon rupture.
Clindamycin Counseling: I counseled the patient regarding use of clindamycin as an antibiotic for prophylactic and/or therapeutic purposes. Clindamycin is active against numerous classes of bacteria, including skin bacteria. Side effects may include nausea, diarrhea, gastrointestinal upset, rash, hives, yeast infections, and in rare cases, colitis.
Xolair Pregnancy And Lactation Text: This medication is Pregnancy Category B and is considered safe during pregnancy. This medication is excreted in breast milk.
Acitretin Counseling:  I discussed with the patient the risks of acitretin including but not limited to hair loss, dry lips/skin/eyes, liver damage, hyperlipidemia, depression/suicidal ideation, photosensitivity.  Serious rare side effects can include but are not limited to pancreatitis, pseudotumor cerebri, bony changes, clot formation/stroke/heart attack.  Patient understands that alcohol is contraindicated since it can result in liver toxicity and significantly prolong the elimination of the drug by many years.
Skyrizi Counseling: I discussed with the patient the risks of risankizumab-rzaa including but not limited to immunosuppression, and serious infections.  The patient understands that monitoring is required including a PPD at baseline and must alert us or the primary physician if symptoms of infection or other concerning signs are noted.
Glycopyrrolate Pregnancy And Lactation Text: This medication is Pregnancy Category B and is considered safe during pregnancy. It is unknown if it is excreted breast milk.
Albendazole Counseling:  I discussed with the patient the risks of albendazole including but not limited to cytopenia, kidney damage, nausea/vomiting and severe allergy.  The patient understands that this medication is being used in an off-label manner.
Olanzapine Pregnancy And Lactation Text: This medication is pregnancy category C.   There are no adequate and well controlled trials with olanzapine in pregnant females.  Olanzapine should be used during pregnancy only if the potential benefit justifies the potential risk to the fetus.   In a study in lactating healthy women, olanzapine was excreted in breast milk.  It is recommended that women taking olanzapine should not breast feed.
Clindamycin Pregnancy And Lactation Text: This medication can be used in pregnancy if certain situations. Clindamycin is also present in breast milk.
Olumiant Counseling: I discussed with the patient the risks of Olumiant therapy including but not limited to upper respiratory tract infections, shingles, cold sores, and nausea. Live vaccines should be avoided.  This medication has been linked to serious infections; higher rate of mortality; malignancy and lymphoproliferative disorders; major adverse cardiovascular events; thrombosis; gastrointestinal perforations; neutropenia; lymphopenia; anemia; liver enzyme elevations; and lipid elevations.
Acitretin Pregnancy And Lactation Text: This medication is Pregnancy Category X and should not be given to women who are pregnant or may become pregnant in the future. This medication is excreted in breast milk.
Hydroxychloroquine Counseling:  I discussed with the patient that a baseline ophthalmologic exam is needed at the start of therapy and every year thereafter while on therapy. A CBC may also be warranted for monitoring.  The side effects of this medication were discussed with the patient, including but not limited to agranulocytosis, aplastic anemia, seizures, rashes, retinopathy, and liver toxicity. Patient instructed to call the office should any adverse effect occur.  The patient verbalized understanding of the proper use and possible adverse effects of Plaquenil.  All the patient's questions and concerns were addressed.
Tazorac Pregnancy And Lactation Text: This medication is not safe during pregnancy. It is unknown if this medication is excreted in breast milk.
Solaraze Counseling:  I discussed with the patient the risks of Solaraze including but not limited to erythema, scaling, itching, weeping, crusting, and pain.
Opzelura Pregnancy And Lactation Text: There is insufficient data to evaluate drug-associated risk for major birth defects, miscarriage, or other adverse maternal or fetal outcomes.  There is a pregnancy registry that monitors pregnancy outcomes in pregnant persons exposed to the medication during pregnancy.  It is unknown if this medication is excreted in breast milk.  Do not breastfeed during treatment and for about 4 weeks after the last dose.
Finasteride Male Counseling: Finasteride Counseling:  I discussed with the patient the risks of use of finasteride including but not limited to decreased libido, decreased ejaculate volume, gynecomastia, and depression. Women should not handle medication.  All of the patient's questions and concerns were addressed.
Solaraze Pregnancy And Lactation Text: This medication is Pregnancy Category B and is considered safe. There is some data to suggest avoiding during the third trimester. It is unknown if this medication is excreted in breast milk.
Topical Sulfur Applications Pregnancy And Lactation Text: This medication is Pregnancy Category C and has an unknown safety profile during pregnancy. It is unknown if this topical medication is excreted in breast milk.
Sski Pregnancy And Lactation Text: This medication is Pregnancy Category D and isn't considered safe during pregnancy. It is excreted in breast milk.
Topical Clindamycin Counseling: Patient counseled that this medication may cause skin irritation or allergic reactions.  In the event of skin irritation, the patient was advised to reduce the amount of the drug applied or use it less frequently.   The patient verbalized understanding of the proper use and possible adverse effects of clindamycin.  All of the patient's questions and concerns were addressed.
Imiquimod Counseling:  I discussed with the patient the risks of imiquimod including but not limited to erythema, scaling, itching, weeping, crusting, and pain.  Patient understands that the inflammatory response to imiquimod is variable from person to person and was educated regarded proper titration schedule.  If flu-like symptoms develop, patient knows to discontinue the medication and contact us.
Cimetidine Counseling:  I discussed with the patient the risks of Cimetidine including but not limited to gynecomastia, headache, diarrhea, nausea, drowsiness, arrhythmias, pancreatitis, skin rashes, psychosis, bone marrow suppression and kidney toxicity.
Azelaic Acid Counseling: Patient counseled that medicine may cause skin irritation and to avoid applying near the eyes.  In the event of skin irritation, the patient was advised to reduce the amount of the drug applied or use it less frequently.   The patient verbalized understanding of the proper use and possible adverse effects of azelaic acid.  All of the patient's questions and concerns were addressed.
Erivedge Counseling- I discussed with the patient the risks of Erivedge including but not limited to nausea, vomiting, diarrhea, constipation, weight loss, changes in the sense of taste, decreased appetite, muscle spasms, and hair loss.  The patient verbalized understanding of the proper use and possible adverse effects of Erivedge.  All of the patient's questions and concerns were addressed.
Infliximab Counseling:  I discussed with the patient the risks of infliximab including but not limited to myelosuppression, immunosuppression, autoimmune hepatitis, demyelinating diseases, lymphoma, and serious infections.  The patient understands that monitoring is required including a PPD at baseline and must alert us or the primary physician if symptoms of infection or other concerning signs are noted.
Stelara Counseling:  I discussed with the patient the risks of ustekinumab including but not limited to immunosuppression, malignancy, posterior leukoencephalopathy syndrome, and serious infections.  The patient understands that monitoring is required including a PPD at baseline and must alert us or the primary physician if symptoms of infection or other concerning signs are noted.
Cyclophosphamide Counseling:  I discussed with the patient the risks of cyclophosphamide including but not limited to hair loss, hormonal abnormalities, decreased fertility, abdominal pain, diarrhea, nausea and vomiting, bone marrow suppression and infection. The patient understands that monitoring is required while taking this medication.
Hydroxychloroquine Pregnancy And Lactation Text: This medication has been shown to cause fetal harm but it isn't assigned a Pregnancy Risk Category. There are small amounts excreted in breast milk.
Colchicine Counseling:  Patient counseled regarding adverse effects including but not limited to stomach upset (nausea, vomiting, stomach pain, or diarrhea).  Patient instructed to limit alcohol consumption while taking this medication.  Colchicine may reduce blood counts especially with prolonged use.  The patient understands that monitoring of kidney function and blood counts may be required, especially at baseline. The patient verbalized understanding of the proper use and possible adverse effects of colchicine.  All of the patient's questions and concerns were addressed.
Azelaic Acid Pregnancy And Lactation Text: This medication is considered safe during pregnancy and breast feeding.
Finasteride Pregnancy And Lactation Text: This medication is absolutely contraindicated during pregnancy. It is unknown if it is excreted in breast milk.
Griseofulvin Counseling:  I discussed with the patient the risks of griseofulvin including but not limited to photosensitivity, cytopenia, liver damage, nausea/vomiting and severe allergy.  The patient understands that this medication is best absorbed when taken with a fatty meal (e.g., ice cream or french fries).
Doxycycline Counseling:  Patient counseled regarding possible photosensitivity and increased risk for sunburn.  Patient instructed to avoid sunlight, if possible.  When exposed to sunlight, patients should wear protective clothing, sunglasses, and sunscreen.  The patient was instructed to call the office immediately if the following severe adverse effects occur:  hearing changes, easy bruising/bleeding, severe headache, or vision changes.  The patient verbalized understanding of the proper use and possible adverse effects of doxycycline.  All of the patient's questions and concerns were addressed.
Picato Counseling:  I discussed with the patient the risks of Picato including but not limited to erythema, scaling, itching, weeping, crusting, and pain.
Olumiant Pregnancy And Lactation Text: Based on animal studies, Olumiant may cause embryo-fetal harm when administered to pregnant women.  The medication should not be used in pregnancy.  Breastfeeding is not recommended during treatment.
Bexarotene Counseling:  I discussed with the patient the risks of bexarotene including but not limited to hair loss, dry lips/skin/eyes, liver abnormalities, hyperlipidemia, pancreatitis, depression/suicidal ideation, photosensitivity, drug rash/allergic reactions, hypothyroidism, anemia, leukopenia, infection, cataracts, and teratogenicity.  Patient understands that they will need regular blood tests to check lipid profile, liver function tests, white blood cell count, thyroid function tests and pregnancy test if applicable.
Oral Minoxidil Counseling- I discussed with the patient the risks of oral minoxidil including but not limited to shortness of breath, swelling of the feet or ankles, dizziness, lightheadedness, unwanted hair growth and allergic reaction.  The patient verbalized understanding of the proper use and possible adverse effects of oral minoxidil.  All of the patient's questions and concerns were addressed.
Albendazole Pregnancy And Lactation Text: This medication is Pregnancy Category C and it isn't known if it is safe during pregnancy. It is also excreted in breast milk.
Dupixent Counseling: I discussed with the patient the risks of dupilumab including but not limited to eye infection and irritation, cold sores, injection site reactions, worsening of asthma, allergic reactions and increased risk of parasitic infection.  Live vaccines should be avoided while taking dupilumab. Dupilumab will also interact with certain medications such as warfarin and cyclosporine. The patient understands that monitoring is required and they must alert us or the primary physician if symptoms of infection or other concerning signs are noted.
Birth Control Pills Counseling: Birth Control Pill Counseling: I discussed with the patient the potential side effects of OCPs including but not limited to increased risk of stroke, heart attack, thrombophlebitis, deep venous thrombosis, hepatic adenomas, breast changes, GI upset, headaches, and depression.  The patient verbalized understanding of the proper use and possible adverse effects of OCPs. All of the patient's questions and concerns were addressed.
Thalidomide Counseling: I discussed with the patient the risks of thalidomide including but not limited to birth defects, anxiety, weakness, chest pain, dizziness, cough and severe allergy.
Rifampin Counseling: I discussed with the patient the risks of rifampin including but not limited to liver damage, kidney damage, red-orange body fluids, nausea/vomiting and severe allergy.
Oral Minoxidil Pregnancy And Lactation Text: This medication should only be used when clearly needed if you are pregnant, attempting to become pregnant or breast feeding.
Doxycycline Pregnancy And Lactation Text: This medication is Pregnancy Category D and not consider safe during pregnancy. It is also excreted in breast milk but is considered safe for shorter treatment courses.
Drysol Counseling:  I discussed with the patient the risks of drysol/aluminum chloride including but not limited to skin rash, itching, irritation, burning.
Wartpeel Counseling:  I discussed with the patient the risks of Wartpeel including but not limited to erythema, scaling, itching, weeping, crusting, and pain.
Zyclara Counseling:  I discussed with the patient the risks of imiquimod including but not limited to erythema, scaling, itching, weeping, crusting, and pain.  Patient understands that the inflammatory response to imiquimod is variable from person to person and was educated regarded proper titration schedule.  If flu-like symptoms develop, patient knows to discontinue the medication and contact us.
Soolantra Counseling: I discussed with the patients the risks of topial Soolantra. This is a medicine which decreases the number of mites and inflammation in the skin. You experience burning, stinging, eye irritation or allergic reactions.  Please call our office if you develop any problems from using this medication.
Doxepin Counseling:  Patient advised that the medication is sedating and not to drive a car after taking this medication. Patient informed of potential adverse effects including but not limited to dry mouth, urinary retention, and blurry vision.  The patient verbalized understanding of the proper use and possible adverse effects of doxepin.  All of the patient's questions and concerns were addressed.
Bexarotene Pregnancy And Lactation Text: This medication is Pregnancy Category X and should not be given to women who are pregnant or may become pregnant. This medication should not be used if you are breast feeding.
Azithromycin Counseling:  I discussed with the patient the risks of azithromycin including but not limited to GI upset, allergic reaction, drug rash, diarrhea, and yeast infections.
Low Dose Naltrexone Counseling- I discussed with the patient the potential risks and side effects of low dose naltrexone including but not limited to: more vivid dreams, headaches, nausea, vomiting, abdominal pain, fatigue, dizziness, and anxiety.
Rituxan Counseling:  I discussed with the patient the risks of Rituxan infusions. Side effects can include infusion reactions, severe drug rashes including mucocutaneous reactions, reactivation of latent hepatitis and other infections and rarely progressive multifocal leukoencephalopathy.  All of the patient's questions and concerns were addressed.
Libtayo Counseling- I discussed with the patient the risks of Libtayo including but not limited to nausea, vomiting, diarrhea, and bone or muscle pain.  The patient verbalized understanding of the proper use and possible adverse effects of Libtayo.  All of the patient's questions and concerns were addressed.
Rinvoq Counseling: I discussed with the patient the risks of Rinvoq therapy including but not limited to upper respiratory tract infections, shingles, cold sores, bronchitis, nausea, cough, fever, acne, and headache. Live vaccines should be avoided.  This medication has been linked to serious infections; higher rate of mortality; malignancy and lymphoproliferative disorders; major adverse cardiovascular events; thrombosis; thrombocytopenia, anemia, and neutropenia; lipid elevations; liver enzyme elevations; and gastrointestinal perforations.
Cyclophosphamide Pregnancy And Lactation Text: This medication is Pregnancy Category D and it isn't considered safe during pregnancy. This medication is excreted in breast milk.
Griseofulvin Pregnancy And Lactation Text: This medication is Pregnancy Category X and is known to cause serious birth defects. It is unknown if this medication is excreted in breast milk but breast feeding should be avoided.
Ivermectin Counseling:  Patient instructed to take medication on an empty stomach with a full glass of water.  Patient informed of potential adverse effects including but not limited to nausea, diarrhea, dizziness, itching, and swelling of the extremities or lymph nodes.  The patient verbalized understanding of the proper use and possible adverse effects of ivermectin.  All of the patient's questions and concerns were addressed.
Klisyri Counseling:  I discussed with the patient the risks of Klisyri including but not limited to erythema, scaling, itching, weeping, crusting, and pain.
Rinvoq Pregnancy And Lactation Text: Based on animal studies, Rinvoq may cause embryo-fetal harm when administered to pregnant women.  The medication should not be used in pregnancy.  Breastfeeding is not recommended during treatment and for 6 days after the last dose.
Otezla Counseling: The side effects of Otezla were discussed with the patient, including but not limited to worsening or new depression, weight loss, diarrhea, nausea, upper respiratory tract infection, and headache. Patient instructed to call the office should any adverse effect occur.  The patient verbalized understanding of the proper use and possible adverse effects of Otezla.  All the patient's questions and concerns were addressed.
Topical Ketoconazole Counseling: Patient counseled that this medication may cause skin irritation or allergic reactions.  In the event of skin irritation, the patient was advised to reduce the amount of the drug applied or use it less frequently.   The patient verbalized understanding of the proper use and possible adverse effects of ketoconazole.  All of the patient's questions and concerns were addressed.
Erythromycin Counseling:  I discussed with the patient the risks of erythromycin including but not limited to GI upset, allergic reaction, drug rash, diarrhea, increase in liver enzymes, and yeast infections.
Protopic Counseling: Patient may experience a mild burning sensation during topical application. Protopic is not approved in children less than 2 years of age. There have been case reports of hematologic and skin malignancies in patients using topical calcineurin inhibitors although causality is questionable.
Cyclosporine Counseling:  I discussed with the patient the risks of cyclosporine including but not limited to hypertension, gingival hyperplasia,myelosuppression, immunosuppression, liver damage, kidney damage, neurotoxicity, lymphoma, and serious infections. The patient understands that monitoring is required including baseline blood pressure, CBC, CMP, lipid panel and uric acid, and then 1-2 times monthly CMP and blood pressure.
Itraconazole Counseling:  I discussed with the patient the risks of itraconazole including but not limited to liver damage, nausea/vomiting, neuropathy, and severe allergy.  The patient understands that this medication is best absorbed when taken with acidic beverages such as non-diet cola or ginger ale.  The patient understands that monitoring is required including baseline LFTs and repeat LFTs at intervals.  The patient understands that they are to contact us or the primary physician if concerning signs are noted.
Benzoyl Peroxide Counseling: Patient counseled that medicine may cause skin irritation and bleach clothing.  In the event of skin irritation, the patient was advised to reduce the amount of the drug applied or use it less frequently.   The patient verbalized understanding of the proper use and possible adverse effects of benzoyl peroxide.  All of the patient's questions and concerns were addressed.
Rifampin Pregnancy And Lactation Text: This medication is Pregnancy Category C and it isn't know if it is safe during pregnancy. It is also excreted in breast milk and should not be used if you are breast feeding.
Soolantra Pregnancy And Lactation Text: This medication is Pregnancy Category C. This medication is considered safe during breast feeding.
Dupixent Pregnancy And Lactation Text: This medication likely crosses the placenta but the risk for the fetus is uncertain. This medication is excreted in breast milk.
Birth Control Pills Pregnancy And Lactation Text: This medication should be avoided if pregnant and for the first 30 days post-partum.
Rituxan Pregnancy And Lactation Text: This medication is Pregnancy Category C and it isn't know if it is safe during pregnancy. It is unknown if this medication is excreted in breast milk but similar antibodies are known to be excreted.
Benzoyl Peroxide Pregnancy And Lactation Text: This medication is Pregnancy Category C. It is unknown if benzoyl peroxide is excreted in breast milk.
Dapsone Counseling: I discussed with the patient the risks of dapsone including but not limited to hemolytic anemia, agranulocytosis, rashes, methemoglobinemia, kidney failure, peripheral neuropathy, headaches, GI upset, and liver toxicity.  Patients who start dapsone require monitoring including baseline LFTs and weekly CBCs for the first month, then every month thereafter.  The patient verbalized understanding of the proper use and possible adverse effects of dapsone.  All of the patient's questions and concerns were addressed.
Tranexamic Acid Counseling:  Patient advised of the small risk of bleeding problems with tranexamic acid. They were also instructed to call if they developed any nausea, vomiting or diarrhea. All of the patient's questions and concerns were addressed.
Enbrel Counseling:  I discussed with the patient the risks of etanercept including but not limited to myelosuppression, immunosuppression, autoimmune hepatitis, demyelinating diseases, lymphoma, and infections.  The patient understands that monitoring is required including a PPD at baseline and must alert us or the primary physician if symptoms of infection or other concerning signs are noted.
Sarecycline Counseling: Patient advised regarding possible photosensitivity and discoloration of the teeth, skin, lips, tongue and gums.  Patient instructed to avoid sunlight, if possible.  When exposed to sunlight, patients should wear protective clothing, sunglasses, and sunscreen.  The patient was instructed to call the office immediately if the following severe adverse effects occur:  hearing changes, easy bruising/bleeding, severe headache, or vision changes.  The patient verbalized understanding of the proper use and possible adverse effects of sarecycline.  All of the patient's questions and concerns were addressed.
Libtayo Pregnancy And Lactation Text: This medication is contraindicated in pregnancy and when breast feeding.
Klisyri Pregnancy And Lactation Text: It is unknown if this medication can harm a developing fetus or if it is excreted in breast milk.
Isotretinoin Counseling: Patient should get monthly blood tests, not donate blood, not drive at night if vision affected, not share medication, and not undergo elective surgery for 6 months after tx completed. Side effects reviewed, pt to contact office should one occur.
Azithromycin Pregnancy And Lactation Text: This medication is considered safe during pregnancy and is also secreted in breast milk.
Taltz Counseling: I discussed with the patient the risks of ixekizumab including but not limited to immunosuppression, serious infections, worsening of inflammatory bowel disease and drug reactions.  The patient understands that monitoring is required including a PPD at baseline and must alert us or the primary physician if symptoms of infection or other concerning signs are noted.
Low Dose Naltrexone Pregnancy And Lactation Text: Naltrexone is pregnancy category C.  There have been no adequate and well-controlled studies in pregnant women.  It should be used in pregnancy only if the potential benefit justifies the potential risk to the fetus.   Limited data indicates that naltrexone is minimally excreted into breastmilk.
Elidel Counseling: Patient may experience a mild burning sensation during topical application. Elidel is not approved in children less than 2 years of age. There have been case reports of hematologic and skin malignancies in patients using topical calcineurin inhibitors although causality is questionable.
Doxepin Pregnancy And Lactation Text: This medication is Pregnancy Category C and it isn't known if it is safe during pregnancy. It is also excreted in breast milk and breast feeding isn't recommended.

## 2023-05-12 NOTE — PROCEDURE: COUNSELING
Tazorac Pregnancy And Lactation Text: This medication is not safe during pregnancy. It is unknown if this medication is excreted in breast milk.
Sarecycline Pregnancy And Lactation Text: This medication is Pregnancy Category D and not consider safe during pregnancy. It is also excreted in breast milk.
Erythromycin Counseling:  I discussed with the patient the risks of erythromycin including but not limited to GI upset, allergic reaction, drug rash, diarrhea, increase in liver enzymes, and yeast infections.
Bactrim Pregnancy And Lactation Text: This medication is Pregnancy Category D and is known to cause fetal risk.  It is also excreted in breast milk.
Aklief counseling:  Patient advised to apply a pea-sized amount only at bedtime and wait 30 minutes after washing their face before applying.  If too drying, patient may add a non-comedogenic moisturizer.  The most commonly reported side effects including irritation, redness, scaling, dryness, stinging, burning, itching, and increased risk of sunburn.  The patient verbalized understanding of the proper use and possible adverse effects of retinoids.  All of the patient's questions and concerns were addressed.
Birth Control Pills Pregnancy And Lactation Text: This medication should be avoided if pregnant and for the first 30 days post-partum.
Isotretinoin Counseling: Patient should get monthly blood tests, not donate blood, not drive at night if vision affected, not share medication, and not undergo elective surgery for 6 months after tx completed. Side effects reviewed, pt to contact office should one occur.
Azelaic Acid Counseling: Patient counseled that medicine may cause skin irritation and to avoid applying near the eyes.  In the event of skin irritation, the patient was advised to reduce the amount of the drug applied or use it less frequently.   The patient verbalized understanding of the proper use and possible adverse effects of azelaic acid.  All of the patient's questions and concerns were addressed.
Doxycycline Counseling:  Patient counseled regarding possible photosensitivity and increased risk for sunburn.  Patient instructed to avoid sunlight, if possible.  When exposed to sunlight, patients should wear protective clothing, sunglasses, and sunscreen.  The patient was instructed to call the office immediately if the following severe adverse effects occur:  hearing changes, easy bruising/bleeding, severe headache, or vision changes.  The patient verbalized understanding of the proper use and possible adverse effects of doxycycline.  All of the patient's questions and concerns were addressed.
Dapsone Counseling: I discussed with the patient the risks of dapsone including but not limited to hemolytic anemia, agranulocytosis, rashes, methemoglobinemia, kidney failure, peripheral neuropathy, headaches, GI upset, and liver toxicity.  Patients who start dapsone require monitoring including baseline LFTs and weekly CBCs for the first month, then every month thereafter.  The patient verbalized understanding of the proper use and possible adverse effects of dapsone.  All of the patient's questions and concerns were addressed.
Topical Sulfur Applications Counseling: Topical Sulfur Counseling: Patient counseled that this medication may cause skin irritation or allergic reactions.  In the event of skin irritation, the patient was advised to reduce the amount of the drug applied or use it less frequently.   The patient verbalized understanding of the proper use and possible adverse effects of topical sulfur application.  All of the patient's questions and concerns were addressed.
Include Pregnancy/Lactation Warning?: No
Azithromycin Pregnancy And Lactation Text: This medication is considered safe during pregnancy and is also secreted in breast milk.
Topical Retinoid Pregnancy And Lactation Text: This medication is Pregnancy Category C. It is unknown if this medication is excreted in breast milk.
Winlevi Counseling:  I discussed with the patient the risks of topical clascoterone including but not limited to erythema, scaling, itching, and stinging. Patient voiced their understanding.
High Dose Vitamin A Pregnancy And Lactation Text: High dose vitamin A therapy is contraindicated during pregnancy and breast feeding.
Tetracycline Counseling: Patient counseled regarding possible photosensitivity and increased risk for sunburn.  Patient instructed to avoid sunlight, if possible.  When exposed to sunlight, patients should wear protective clothing, sunglasses, and sunscreen.  The patient was instructed to call the office immediately if the following severe adverse effects occur:  hearing changes, easy bruising/bleeding, severe headache, or vision changes.  The patient verbalized understanding of the proper use and possible adverse effects of tetracycline.  All of the patient's questions and concerns were addressed. Patient understands to avoid pregnancy while on therapy due to potential birth defects.
Benzoyl Peroxide Pregnancy And Lactation Text: This medication is Pregnancy Category C. It is unknown if benzoyl peroxide is excreted in breast milk.
Aklief Pregnancy And Lactation Text: It is unknown if this medication is safe to use during pregnancy.  It is unknown if this medication is excreted in breast milk.  Breastfeeding women should use the topical cream on the smallest area of the skin for the shortest time needed while breastfeeding.  Do not apply to nipple and areola.
Tazorac Counseling:  Patient advised that medication is irritating and drying.  Patient may need to apply sparingly and wash off after an hour before eventually leaving it on overnight.  The patient verbalized understanding of the proper use and possible adverse effects of tazorac.  All of the patient's questions and concerns were addressed.
Winlevi Pregnancy And Lactation Text: This medication is considered safe during pregnancy and breastfeeding.
Isotretinoin Pregnancy And Lactation Text: This medication is Pregnancy Category X and is considered extremely dangerous during pregnancy. It is unknown if it is excreted in breast milk.
Spironolactone Counseling: Patient advised regarding risks of diarrhea, abdominal pain, hyperkalemia, birth defects (for female patients), liver toxicity and renal toxicity. The patient may need blood work to monitor liver and kidney function and potassium levels while on therapy. The patient verbalized understanding of the proper use and possible adverse effects of spironolactone.  All of the patient's questions and concerns were addressed.
Azelaic Acid Pregnancy And Lactation Text: This medication is considered safe during pregnancy and breast feeding.
Topical Clindamycin Counseling: Patient counseled that this medication may cause skin irritation or allergic reactions.  In the event of skin irritation, the patient was advised to reduce the amount of the drug applied or use it less frequently.   The patient verbalized understanding of the proper use and possible adverse effects of clindamycin.  All of the patient's questions and concerns were addressed.
Sarecycline Counseling: Patient advised regarding possible photosensitivity and discoloration of the teeth, skin, lips, tongue and gums.  Patient instructed to avoid sunlight, if possible.  When exposed to sunlight, patients should wear protective clothing, sunglasses, and sunscreen.  The patient was instructed to call the office immediately if the following severe adverse effects occur:  hearing changes, easy bruising/bleeding, severe headache, or vision changes.  The patient verbalized understanding of the proper use and possible adverse effects of sarecycline.  All of the patient's questions and concerns were addressed.
Erythromycin Pregnancy And Lactation Text: This medication is Pregnancy Category B and is considered safe during pregnancy. It is also excreted in breast milk.
Birth Control Pills Counseling: Birth Control Pill Counseling: I discussed with the patient the potential side effects of OCPs including but not limited to increased risk of stroke, heart attack, thrombophlebitis, deep venous thrombosis, hepatic adenomas, breast changes, GI upset, headaches, and depression.  The patient verbalized understanding of the proper use and possible adverse effects of OCPs. All of the patient's questions and concerns were addressed.
Topical Sulfur Applications Pregnancy And Lactation Text: This medication is Pregnancy Category C and has an unknown safety profile during pregnancy. It is unknown if this topical medication is excreted in breast milk.
Detail Level: Detailed
High Dose Vitamin A Counseling: Side effects reviewed, pt to contact office should one occur.
Spironolactone Pregnancy And Lactation Text: This medication can cause feminization of the male fetus and should be avoided during pregnancy. The active metabolite is also found in breast milk.
Dapsone Pregnancy And Lactation Text: This medication is Pregnancy Category C and is not considered safe during pregnancy or breast feeding.
Benzoyl Peroxide Counseling: Patient counseled that medicine may cause skin irritation and bleach clothing.  In the event of skin irritation, the patient was advised to reduce the amount of the drug applied or use it less frequently.   The patient verbalized understanding of the proper use and possible adverse effects of benzoyl peroxide.  All of the patient's questions and concerns were addressed.
Topical Clindamycin Pregnancy And Lactation Text: This medication is Pregnancy Category B and is considered safe during pregnancy. It is unknown if it is excreted in breast milk.
Bactrim Counseling:  I discussed with the patient the risks of sulfa antibiotics including but not limited to GI upset, allergic reaction, drug rash, diarrhea, dizziness, photosensitivity, and yeast infections.  Rarely, more serious reactions can occur including but not limited to aplastic anemia, agranulocytosis, methemoglobinemia, blood dyscrasias, liver or kidney failure, lung infiltrates or desquamative/blistering drug rashes.
Minocycline Counseling: Patient advised regarding possible photosensitivity and discoloration of the teeth, skin, lips, tongue and gums.  Patient instructed to avoid sunlight, if possible.  When exposed to sunlight, patients should wear protective clothing, sunglasses, and sunscreen.  The patient was instructed to call the office immediately if the following severe adverse effects occur:  hearing changes, easy bruising/bleeding, severe headache, or vision changes.  The patient verbalized understanding of the proper use and possible adverse effects of minocycline.  All of the patient's questions and concerns were addressed.
Doxycycline Pregnancy And Lactation Text: This medication is Pregnancy Category D and not consider safe during pregnancy. It is also excreted in breast milk but is considered safe for shorter treatment courses.
Topical Retinoid counseling:  Patient advised to apply a pea-sized amount only at bedtime and wait 30 minutes after washing their face before applying.  If too drying, patient may add a non-comedogenic moisturizer. The patient verbalized understanding of the proper use and possible adverse effects of retinoids.  All of the patient's questions and concerns were addressed.
Azithromycin Counseling:  I discussed with the patient the risks of azithromycin including but not limited to GI upset, allergic reaction, drug rash, diarrhea, and yeast infections.

## 2023-05-12 NOTE — PROCEDURE: PRESCRIPTION SAMPLES PROVIDED
Samples Given: Winlevi x2
Expiration Date (Optional): 01/2024
Detail Level: Zone
Lot/Batch Number (Optional):

## 2023-05-26 DIAGNOSIS — E22.1 HYPERPROLACTINEMIA (HCC): ICD-10-CM

## 2023-06-22 ENCOUNTER — APPOINTMENT (RX ONLY)
Dept: URBAN - METROPOLITAN AREA CLINIC 4 | Facility: CLINIC | Age: 28
Setting detail: DERMATOLOGY
End: 2023-06-22

## 2023-06-22 DIAGNOSIS — L70.0 ACNE VULGARIS: ICD-10-CM

## 2023-06-22 PROCEDURE — ? PRESCRIPTION

## 2023-06-22 PROCEDURE — ? ADDITIONAL NOTES

## 2023-06-22 PROCEDURE — 99213 OFFICE O/P EST LOW 20 MIN: CPT

## 2023-06-22 PROCEDURE — ? COUNSELING

## 2023-06-22 RX ORDER — SPIRONOLACTONE 25 MG/1
1 TABLET, FILM COATED ORAL
Qty: 30 | Refills: 3 | Status: ERX | COMMUNITY
Start: 2023-06-22

## 2023-06-22 RX ADMIN — SPIRONOLACTONE 1: 25 TABLET, FILM COATED ORAL at 00:00

## 2023-06-22 ASSESSMENT — LOCATION ZONE DERM
LOCATION ZONE: TRUNK
LOCATION ZONE: FACE

## 2023-06-22 ASSESSMENT — LOCATION SIMPLE DESCRIPTION DERM
LOCATION SIMPLE: LEFT CHEEK
LOCATION SIMPLE: LEFT BUTTOCK
LOCATION SIMPLE: RIGHT CHEEK
LOCATION SIMPLE: UPPER BACK
LOCATION SIMPLE: RIGHT BUTTOCK

## 2023-06-22 ASSESSMENT — LOCATION DETAILED DESCRIPTION DERM
LOCATION DETAILED: SUPERIOR THORACIC SPINE
LOCATION DETAILED: LEFT INFERIOR CENTRAL MALAR CHEEK
LOCATION DETAILED: RIGHT INFERIOR CENTRAL MALAR CHEEK
LOCATION DETAILED: LEFT BUTTOCK
LOCATION DETAILED: RIGHT BUTTOCK

## 2023-06-22 NOTE — PROCEDURE: ADDITIONAL NOTES
Additional Notes: Patient states that the doxycycline she got 05/12/23 had not helped and made pt sick stopped taking it after two weeks, pt stated she was using spiranolactone in the past and that it helped, Advised pt we could try spiranolactone again and to follow up in 2 months.  Informed that some birth control does not work with spiralactone and she is to double check with pharmacy prior to taking medication.
Detail Level: Simple
Render Risk Assessment In Note?: no

## 2023-07-31 ENCOUNTER — TELEPHONE (OUTPATIENT)
Dept: HEALTH INFORMATION MANAGEMENT | Facility: OTHER | Age: 28
End: 2023-07-31

## 2023-08-10 ENCOUNTER — APPOINTMENT (OUTPATIENT)
Dept: URGENT CARE | Facility: PHYSICIAN GROUP | Age: 28
End: 2023-08-10
Payer: COMMERCIAL

## 2023-08-11 ENCOUNTER — OFFICE VISIT (OUTPATIENT)
Dept: URGENT CARE | Facility: PHYSICIAN GROUP | Age: 28
End: 2023-08-11
Payer: COMMERCIAL

## 2023-08-11 VITALS
DIASTOLIC BLOOD PRESSURE: 70 MMHG | RESPIRATION RATE: 16 BRPM | SYSTOLIC BLOOD PRESSURE: 102 MMHG | WEIGHT: 202 LBS | TEMPERATURE: 97.8 F | HEART RATE: 70 BPM | BODY MASS INDEX: 31.71 KG/M2 | OXYGEN SATURATION: 98 % | HEIGHT: 67 IN

## 2023-08-11 DIAGNOSIS — L03.116 CELLULITIS OF LEFT LOWER EXTREMITY: ICD-10-CM

## 2023-08-11 PROCEDURE — 99213 OFFICE O/P EST LOW 20 MIN: CPT

## 2023-08-11 PROCEDURE — 3078F DIAST BP <80 MM HG: CPT

## 2023-08-11 PROCEDURE — 3074F SYST BP LT 130 MM HG: CPT

## 2023-08-11 RX ORDER — CEPHALEXIN 500 MG/1
500 CAPSULE ORAL 4 TIMES DAILY
Qty: 28 CAPSULE | Refills: 0 | Status: SHIPPED | OUTPATIENT
Start: 2023-08-11 | End: 2023-08-18

## 2023-08-11 ASSESSMENT — FIBROSIS 4 INDEX: FIB4 SCORE: 0.42

## 2023-08-11 NOTE — PROGRESS NOTES
Subjective     Shy Buchanan is a 27 y.o. female who presents with Leg Injury (Scraped Lt ankle and calf playing sports x1wk ago, not healing. )            HPI patient states 7 days ago today she was playing softball and slid on the turf scraping the outside of her left ankle and the side of her left calf.  She says that she washed it with soap and water a day and little bit of hydrogen peroxide.  She put some Neosporin on it later.  Unfortunately she says its not getting better.  The past several days she is having increasing swelling, redness and started to have drainage.  She says the drainage is less today  She denies any fevers, chills, body aches    ROS same as above    Allergies   Allergen Reactions    Hydrocodone-Acetaminophen Nausea    Vicodin [Hydrocodone-Acetaminophen] Vomiting    Contrast Media With Iodine [Iodine] Hives and Itching    Latex Hives, Itching and Rash    Lidocaine Hives       Current Outpatient Medications   Medication Sig    cephALEXin (KEFLEX) 500 MG Cap Take 1 Capsule by mouth 4 times a day for 7 days.    mupirocin (BACTROBAN) 2 % Ointment Apply 1 Application topically 2 times a day.    atomoxetine (STRATTERA) 60 MG capsule     traZODone (DESYREL) 50 MG Tab as needed.    montelukast (SINGULAIR) 10 MG Tab Take 1 Tablet by mouth every day for 360 days.    Cariprazine HCl 1.5 MG Cap Take 1 Capsule by mouth every day.    ondansetron (ZOFRAN ODT) 4 MG TABLET DISPERSIBLE Take 1 Tablet by mouth every 8 hours as needed for Nausea/Vomiting.    lamoTRIgine (LAMICTAL) 100 MG Tab Take 1 Tablet by mouth every day.    albuterol (PROVENTIL) 2.5mg/3ml Nebu Soln solution for nebulization Take 3 mL by nebulization every four hours as needed for Shortness of Breath.    ibuprofen (MOTRIN) 800 MG Tab Take 1 Tablet by mouth every 8 hours as needed for Moderate Pain.    folic acid (FOLVITE) 1 MG Tab Take 2 Tablets by mouth every day.    mometasone (ELOCON) 0.1 % Cream Apply 1 g topically as needed (For  "rash on hip).    Azelastine HCl 137 MCG/SPRAY Solution Administer 1 split tablet into affected nostril(S) at bedtime.    fluticasone (FLONASE) 50 MCG/ACT nasal spray Administer 1 Spray into affected nostril(S) at bedtime.        Past Medical History:   Diagnosis Date    Epilepsy (HCC)     Gynecological disorder     Endometriosis    Headache 12/31/2014    Healthy adult     Heart burn     Hypertension     off medication for 2 years    Indigestion     Lupus (HCC)     PONV (postoperative nausea and vomiting)     Rash 3/21/2019        Objective     /70   Pulse 70   Temp 36.6 °C (97.8 °F) (Temporal)   Resp 16   Ht 1.702 m (5' 7\")   Wt 91.6 kg (202 lb)   SpO2 98%   BMI 31.64 kg/m²      Physical Exam  Constitutional:       Appearance: Normal appearance. She is not ill-appearing.   HENT:      Head: Normocephalic and atraumatic.      Right Ear: External ear normal.      Left Ear: External ear normal.      Nose: Nose normal.      Mouth/Throat:      Mouth: Mucous membranes are moist.   Eyes:      Pupils: Pupils are equal, round, and reactive to light.   Cardiovascular:      Rate and Rhythm: Normal rate.   Pulmonary:      Effort: Pulmonary effort is normal.   Abdominal:      Palpations: Abdomen is soft.   Musculoskeletal:         General: Normal range of motion.      Cervical back: Normal range of motion and neck supple.      Right ankle: Swelling present. No deformity or ecchymosis. Tenderness present. Normal range of motion.      Right Achilles Tendon: No tenderness.      Comments: There is edema, erythema, tenderness to palpation just distal of the lateral malleolus.  This is soft tissue tenderness, no bony tenderness noted, no decreased ankle ROM, plantar and dorsiflexion intact, DP and PT pulses palpable.  No pain with palpation of Achilles tendon, no pain with palpation to medial malleolus, no pain with palpation to midfoot.   Skin:     General: Skin is warm and dry.      Findings: Wound present.      " Comments: Approximately 3.5 cm circular area of edema and erythema to lateral foot just distal to the lateral malleolus.  0.5 x 0.5 area of open tissue, scant serous drainage.  Purulent drainage able to be expressed, no fluctuance, no abscess.   Neurological:      Mental Status: She is alert and oriented to person, place, and time.           Assessment & Plan        Patient presents today after falling and scraping her left outer ankle and calf a week ago.  She states she cleaned it well, and thought things were healing within 7 days ago she began to have increasing swelling, redness with some drainage.  She states the drainage is less today but she still has pain, swelling and redness.  She was concerned that she was starting to have infection.    There is edema and erythema to approximately 3.5 x 3.5 circular area just distal to the lateral malleolus.  There is a small 5.5 x 5.5 area open tissue with scant serous drainage.  There is no fluctuance to the area of edema, no purulent drainage able to be expressed.  No sign of abscess.  There is no tenderness palpation with medial malleolus, posterior ankle, Achilles tendon, or forefoot.  Ankle ROM intact, dorsiflexion plantarflexion strength intact.  Distal neuro/vascular intact.    Discussed antibiotics for cellulitis.  Educated on signs of symptoms of increasing infection, and reasons to return to urgent care or ED.  Patient in agreement with plan of care.Differential diagnosis, natural history, supportive care, and indications for immediate follow-up were discussed.        1. Cellulitis of left lower extremity  cephALEXin (KEFLEX) 500 MG Cap    mupirocin (BACTROBAN) 2 % Ointment

## 2023-09-01 ENCOUNTER — OFFICE VISIT (OUTPATIENT)
Dept: MEDICAL GROUP | Facility: LAB | Age: 28
End: 2023-09-01
Payer: COMMERCIAL

## 2023-09-01 VITALS
HEART RATE: 75 BPM | WEIGHT: 201 LBS | HEIGHT: 67 IN | OXYGEN SATURATION: 98 % | SYSTOLIC BLOOD PRESSURE: 110 MMHG | TEMPERATURE: 97.4 F | DIASTOLIC BLOOD PRESSURE: 74 MMHG | RESPIRATION RATE: 15 BRPM | BODY MASS INDEX: 31.55 KG/M2

## 2023-09-01 DIAGNOSIS — E66.09 CLASS 1 OBESITY DUE TO EXCESS CALORIES WITHOUT SERIOUS COMORBIDITY WITH BODY MASS INDEX (BMI) OF 31.0 TO 31.9 IN ADULT: ICD-10-CM

## 2023-09-01 DIAGNOSIS — E22.1 HYPERPROLACTINEMIA (HCC): ICD-10-CM

## 2023-09-01 PROCEDURE — 3074F SYST BP LT 130 MM HG: CPT | Performed by: FAMILY MEDICINE

## 2023-09-01 PROCEDURE — 3078F DIAST BP <80 MM HG: CPT | Performed by: FAMILY MEDICINE

## 2023-09-01 PROCEDURE — 99213 OFFICE O/P EST LOW 20 MIN: CPT | Performed by: FAMILY MEDICINE

## 2023-09-01 RX ORDER — SPIRONOLACTONE 25 MG/1
TABLET ORAL
COMMUNITY
End: 2024-03-06

## 2023-09-01 RX ORDER — CLONAZEPAM 1 MG/1
TABLET ORAL
COMMUNITY

## 2023-09-01 RX ORDER — LAMOTRIGINE 100 MG/1
1 TABLET ORAL
COMMUNITY
End: 2023-09-01

## 2023-09-01 RX ORDER — DOXYCYCLINE HYCLATE 100 MG/1
CAPSULE ORAL
COMMUNITY
End: 2023-09-01

## 2023-09-01 RX ORDER — CLONAZEPAM 1 MG/1
TABLET ORAL
COMMUNITY
Start: 2023-08-18 | End: 2023-09-01

## 2023-09-01 RX ORDER — OFLOXACIN 3 MG/ML
SOLUTION AURICULAR (OTIC)
COMMUNITY
End: 2023-09-01

## 2023-09-01 RX ORDER — SEMAGLUTIDE 0.68 MG/ML
INJECTION, SOLUTION SUBCUTANEOUS
COMMUNITY
Start: 2023-08-18 | End: 2023-09-01

## 2023-09-01 RX ORDER — NORGESTIMATE AND ETHINYL ESTRADIOL 0.25-0.035
KIT ORAL
COMMUNITY
End: 2023-09-01

## 2023-09-01 RX ORDER — CEPHALEXIN 500 MG/1
CAPSULE ORAL
COMMUNITY
End: 2023-09-01

## 2023-09-01 RX ORDER — ATOMOXETINE 60 MG/1
1 CAPSULE ORAL EVERY MORNING
COMMUNITY

## 2023-09-01 RX ORDER — NORGESTIMATE AND ETHINYL ESTRADIOL 0.25-0.035
1 KIT ORAL 2 TIMES DAILY
COMMUNITY
End: 2023-09-01

## 2023-09-01 RX ORDER — SPIRONOLACTONE 25 MG/1
TABLET ORAL
COMMUNITY
Start: 2023-06-22 | End: 2023-09-01

## 2023-09-01 RX ORDER — SEMAGLUTIDE 0.68 MG/ML
INJECTION, SOLUTION SUBCUTANEOUS
COMMUNITY
End: 2024-02-07

## 2023-09-01 RX ORDER — ATOMOXETINE 40 MG/1
1 CAPSULE ORAL
COMMUNITY
End: 2023-09-01

## 2023-09-01 RX ORDER — CLASCOTERONE 1 G/100G
CREAM TOPICAL
COMMUNITY
Start: 2023-08-17 | End: 2023-09-01

## 2023-09-01 RX ORDER — CLASCOTERONE 1 G/100G
CREAM TOPICAL
COMMUNITY

## 2023-09-01 RX ORDER — AMOXICILLIN AND CLAVULANATE POTASSIUM 875; 125 MG/1; MG/1
TABLET, FILM COATED ORAL
COMMUNITY
End: 2023-09-01

## 2023-09-01 ASSESSMENT — ENCOUNTER SYMPTOMS
FEVER: 0
CHILLS: 0
ABDOMINAL PAIN: 0
SHORTNESS OF BREATH: 0
PALPITATIONS: 0
NAUSEA: 0
WHEEZING: 0
VOMITING: 0
NERVOUS/ANXIOUS: 0
DEPRESSION: 0

## 2023-09-01 ASSESSMENT — FIBROSIS 4 INDEX: FIB4 SCORE: 0.43

## 2023-09-01 NOTE — PROGRESS NOTES
Subjective:   Shy Buchanan is a 28 y.o. female here today for   Chief Complaint   Patient presents with    Results     MRI results     Weight Check       #Hyperprolactinemia:  -Patient was to complete an MRI after seeing signs of elevated prolactin levels on labs.  MRI was completed with unremarkable results.  Patient denies any abnormal periods, changes in vision, nipple discharge or lactation.    #Obesity:  -Patient's been treating with Ozempic. Started medication aprox 2 weeks ago. Working with nutritionist, weight loss clinic. Patient tolerating medication well.  Is working on appropriate diet and exercise regimen, feeling well with no concerns at this time.      Allergies   Allergen Reactions    Hydrocodone-Acetaminophen Nausea    Vicodin [Hydrocodone-Acetaminophen] Vomiting    Contrast Media With Iodine [Iodine] Hives and Itching    Latex Hives, Itching and Rash    Lidocaine Hives         Current medicines (including changes today)  Current Outpatient Medications   Medication Sig Dispense Refill    atomoxetine (STRATTERA) 60 MG capsule Take 1 Capsule by mouth every morning.      spironolactone (ALDACTONE) 25 MG Tab TAKE 1 TABLET BY MOUTH EVERY DAY FOR ACNE      Semaglutide,0.25 or 0.5MG/DOS, (OZEMPIC, 0.25 OR 0.5 MG/DOSE,) 2 MG/3ML Solution Pen-injector INJECT 0.25MG SUBCUTANEOUS ONE DAY A WEEK FOR 4 WEEKS      clonazePAM (KLONOPIN) 1 MG Tab TAKE 1/2-1 TABLET BY MOUTH IN A SINGLE DOSE AS NEEDED FOR PANIC      Clascoterone (WINLEVI) 1 % Cream       traZODone (DESYREL) 50 MG Tab as needed.      montelukast (SINGULAIR) 10 MG Tab Take 1 Tablet by mouth every day for 360 days. 90 Tablet 3    ondansetron (ZOFRAN ODT) 4 MG TABLET DISPERSIBLE Take 1 Tablet by mouth every 8 hours as needed for Nausea/Vomiting. 10 Tablet 0    lamoTRIgine (LAMICTAL) 100 MG Tab Take 1 Tablet by mouth every day. 30 Tablet 6    ibuprofen (MOTRIN) 800 MG Tab Take 1 Tablet by mouth every 8 hours as needed for Moderate Pain. 90 Tablet 0  "   folic acid (FOLVITE) 1 MG Tab Take 2 Tablets by mouth every day. 60 Tablet 11    mometasone (ELOCON) 0.1 % Cream Apply 1 g topically as needed (For rash on hip).      mupirocin (BACTROBAN) 2 % Ointment Apply 1 Application topically 2 times a day. 22 g 0    albuterol (PROVENTIL) 2.5mg/3ml Nebu Soln solution for nebulization Take 3 mL by nebulization every four hours as needed for Shortness of Breath. 1 Each 2     No current facility-administered medications for this visit.     She  has a past medical history of Epilepsy ([facility]), Gynecological disorder, Headache (12/31/2014), Healthy adult, Heart burn, Hypertension, Indigestion, Lupus ([facility]), PONV (postoperative nausea and vomiting), and Rash (3/21/2019).    She has no past medical history of Hyperlipidemia.    ROS   Review of Systems   Constitutional:  Negative for chills and fever.   Respiratory:  Negative for shortness of breath and wheezing.    Cardiovascular:  Negative for chest pain and palpitations.   Gastrointestinal:  Negative for abdominal pain, nausea and vomiting.   Psychiatric/Behavioral:  Negative for depression. The patient is not nervous/anxious.           Objective:     Physical Exam:  /74   Pulse 75   Temp 36.3 °C (97.4 °F) (Temporal)   Resp 15   Ht 1.702 m (5' 7.01\")   Wt 91.2 kg (201 lb)   SpO2 98%  Body mass index is 31.47 kg/m².   Constitutional: Alert, no distress.  Skin: Warm, dry, good turgor, no rashes in visible areas.  Eye: Equal, round and reactive, conjunctiva clear, lids normal.  Respiratory: Unlabored respiratory effort, lungs clear to auscultation, no wheezes, no rhonchi.  Cardiovascular: Normal S1, S2, no murmur, no edema.  Abdomen: Soft, non-tender, no masses, no hepatosplenomegaly.  Psych: Alert and oriented x3, normal affect and mood.    Assessment and Plan:     1. Hyperprolactinemia ([facility])  Reviewed MRI of brain, unremarkable with no signs of pituitary adenoma.  Hyperprolactinemia most likely due to previous use of " Vraylar for treatment of bipolar.  Patient is no longer on medication, asymptomatic at this time.  Patient to continue to monitor symptoms and follow-up as needed.    2. Class 1 obesity due to excess calories without serious comorbidity with body mass index (BMI) of 31.0 to 31.9 in adult  Patient will continue follow-up with dietitian, weight loss clinic.  She will continue titrating Ozempic per treating physician.  Discussed potential effects of Ozempic, discussed the importance of continuation of good diet and exercise regiment.  Patient will follow-up as needed.      Followup: No follow-ups on file.         PLEASE NOTE: This dictation was created using voice recognition software. I have made every reasonable attempt to correct obvious errors, but I expect that there are errors of grammar and possibly content that I did not discover before finalizing the note.

## 2023-10-24 ENCOUNTER — HOSPITAL ENCOUNTER (OUTPATIENT)
Dept: LAB | Facility: MEDICAL CENTER | Age: 28
End: 2023-10-24
Attending: ALLERGY & IMMUNOLOGY
Payer: COMMERCIAL

## 2023-10-24 LAB
ALBUMIN SERPL BCP-MCNC: 4.2 G/DL (ref 3.2–4.9)
ALBUMIN/GLOB SERPL: 1.3 G/DL
ALP SERPL-CCNC: 74 U/L (ref 30–99)
ALT SERPL-CCNC: 18 U/L (ref 2–50)
ANION GAP SERPL CALC-SCNC: 8 MMOL/L (ref 7–16)
APPEARANCE UR: CLEAR
AST SERPL-CCNC: 20 U/L (ref 12–45)
BASOPHILS # BLD AUTO: 0.6 % (ref 0–1.8)
BASOPHILS # BLD: 0.05 K/UL (ref 0–0.12)
BILIRUB SERPL-MCNC: 0.3 MG/DL (ref 0.1–1.5)
BILIRUB UR QL STRIP.AUTO: NEGATIVE
BUN SERPL-MCNC: 8 MG/DL (ref 8–22)
CALCIUM ALBUM COR SERPL-MCNC: 9 MG/DL (ref 8.5–10.5)
CALCIUM SERPL-MCNC: 9.2 MG/DL (ref 8.5–10.5)
CHLORIDE SERPL-SCNC: 104 MMOL/L (ref 96–112)
CO2 SERPL-SCNC: 26 MMOL/L (ref 20–33)
COLOR UR: YELLOW
CREAT SERPL-MCNC: 0.79 MG/DL (ref 0.5–1.4)
EOSINOPHIL # BLD AUTO: 0.27 K/UL (ref 0–0.51)
EOSINOPHIL NFR BLD: 3.1 % (ref 0–6.9)
ERYTHROCYTE [DISTWIDTH] IN BLOOD BY AUTOMATED COUNT: 42.5 FL (ref 35.9–50)
FASTING STATUS PATIENT QL REPORTED: NORMAL
GFR SERPLBLD CREATININE-BSD FMLA CKD-EPI: 104 ML/MIN/1.73 M 2
GLOBULIN SER CALC-MCNC: 3.2 G/DL (ref 1.9–3.5)
GLUCOSE SERPL-MCNC: 77 MG/DL (ref 65–99)
GLUCOSE UR STRIP.AUTO-MCNC: NEGATIVE MG/DL
HCT VFR BLD AUTO: 43.6 % (ref 37–47)
HGB BLD-MCNC: 14 G/DL (ref 12–16)
IMM GRANULOCYTES # BLD AUTO: 0.02 K/UL (ref 0–0.11)
IMM GRANULOCYTES NFR BLD AUTO: 0.2 % (ref 0–0.9)
KETONES UR STRIP.AUTO-MCNC: NEGATIVE MG/DL
LEUKOCYTE ESTERASE UR QL STRIP.AUTO: NEGATIVE
LYMPHOCYTES # BLD AUTO: 2.18 K/UL (ref 1–4.8)
LYMPHOCYTES NFR BLD: 25 % (ref 22–41)
MCH RBC QN AUTO: 27.9 PG (ref 27–33)
MCHC RBC AUTO-ENTMCNC: 32.1 G/DL (ref 32.2–35.5)
MCV RBC AUTO: 87 FL (ref 81.4–97.8)
MICRO URNS: NORMAL
MONOCYTES # BLD AUTO: 0.59 K/UL (ref 0–0.85)
MONOCYTES NFR BLD AUTO: 6.8 % (ref 0–13.4)
NEUTROPHILS # BLD AUTO: 5.61 K/UL (ref 1.82–7.42)
NEUTROPHILS NFR BLD: 64.3 % (ref 44–72)
NITRITE UR QL STRIP.AUTO: NEGATIVE
NRBC # BLD AUTO: 0 K/UL
NRBC BLD-RTO: 0 /100 WBC (ref 0–0.2)
PH UR STRIP.AUTO: 5.5 [PH] (ref 5–8)
PLATELET # BLD AUTO: 291 K/UL (ref 164–446)
PMV BLD AUTO: 9.9 FL (ref 9–12.9)
POTASSIUM SERPL-SCNC: 4.6 MMOL/L (ref 3.6–5.5)
PROT SERPL-MCNC: 7.4 G/DL (ref 6–8.2)
PROT UR QL STRIP: NEGATIVE MG/DL
RBC # BLD AUTO: 5.01 M/UL (ref 4.2–5.4)
RBC UR QL AUTO: NEGATIVE
SODIUM SERPL-SCNC: 138 MMOL/L (ref 135–145)
SP GR UR STRIP.AUTO: 1.01
T4 FREE SERPL-MCNC: 0.94 NG/DL (ref 0.93–1.7)
T4 SERPL-MCNC: 5 UG/DL (ref 4–12)
THYROPEROXIDASE AB SERPL-ACNC: <9 IU/ML (ref 0–9)
TSH SERPL DL<=0.005 MIU/L-ACNC: 0.96 UIU/ML (ref 0.38–5.33)
UROBILINOGEN UR STRIP.AUTO-MCNC: 0.2 MG/DL
WBC # BLD AUTO: 8.7 K/UL (ref 4.8–10.8)

## 2023-10-24 PROCEDURE — 86003 ALLG SPEC IGE CRUDE XTRC EA: CPT | Mod: 91

## 2023-10-24 PROCEDURE — 86376 MICROSOMAL ANTIBODY EACH: CPT

## 2023-10-24 PROCEDURE — 80053 COMPREHEN METABOLIC PANEL: CPT

## 2023-10-24 PROCEDURE — 88185 FLOWCYTOMETRY/TC ADD-ON: CPT

## 2023-10-24 PROCEDURE — 86038 ANTINUCLEAR ANTIBODIES: CPT

## 2023-10-24 PROCEDURE — 36415 COLL VENOUS BLD VENIPUNCTURE: CPT

## 2023-10-24 PROCEDURE — 84439 ASSAY OF FREE THYROXINE: CPT

## 2023-10-24 PROCEDURE — 84479 ASSAY OF THYROID (T3 OR T4): CPT

## 2023-10-24 PROCEDURE — 81003 URINALYSIS AUTO W/O SCOPE: CPT

## 2023-10-24 PROCEDURE — 83520 IMMUNOASSAY QUANT NOS NONAB: CPT

## 2023-10-24 PROCEDURE — 88184 FLOWCYTOMETRY/ TC 1 MARKER: CPT

## 2023-10-24 PROCEDURE — 85025 COMPLETE CBC W/AUTO DIFF WBC: CPT

## 2023-10-24 PROCEDURE — 82785 ASSAY OF IGE: CPT

## 2023-10-24 PROCEDURE — 84443 ASSAY THYROID STIM HORMONE: CPT

## 2023-10-26 LAB
NUCLEAR IGG SER QL IA: NORMAL
T UPTAKE NL11712: 0.8 TBI (ref 0.8–1.3)
TRYPTASE SERPL-MCNC: 5.8 UG/L

## 2023-10-27 LAB
A ALTERNATA IGE QN: 0.12 KU/L
A FUMIGATUS IGE QN: 0.2 KU/L
BERMUDA GRASS IGE QN: 12 KU/L
BOXELDER IGE QN: 7.16 KU/L
C SPHAEROSPERMUM IGE QN: 0.11 KU/L
CAT DANDER IGE QN: 9.55 KU/L
CMN PIGWEED IGE QN: 6.97 KU/L
COMMON RAGWEED IGE QN: 24.6 KU/L
COTTONWOOD IGE QN: 5.06 KU/L
D FARINAE IGE QN: 1.06 KU/L
D PTERONYSS IGE QN: 0.55 KU/L
DEPRECATED MISC ALLERGEN IGE RAST QL: NORMAL
DOG DANDER IGE QN: 19.8 KU/L
IGE SERPL-ACNC: 3647 KU/L
LTX IGE QN: 3.43 KU/L
M RACEMOSUS IGE QN: 0.75 KU/L
MOUSE EPITH IGE QN: 0.13 KU/L
MT JUNIPER IGE QN: 5.95 KU/L
MUGWORT IGE QN: 34.2 KU/L
OLIVE POLN IGE QN: 5.43 KU/L
P NOTATUM IGE QN: 0.11 KU/L
ROACH IGE QN: 1.87 KU/L
SALTWORT IGE QN: 4.66 KU/L
TIMOTHY IGE QN: 45.8 KU/L
WHITE ELM IGE QN: 9.9 KU/L
WHITE MULBERRY IGE QN: 4.12 KU/L
WHITE OAK IGE QN: 7.9 KU/L

## 2023-11-01 LAB — URTIIND BASO ACT Q4770: 14 %

## 2023-12-12 ENCOUNTER — OFFICE VISIT (OUTPATIENT)
Dept: URGENT CARE | Facility: PHYSICIAN GROUP | Age: 28
End: 2023-12-12
Payer: COMMERCIAL

## 2023-12-12 ENCOUNTER — HOSPITAL ENCOUNTER (OUTPATIENT)
Dept: RADIOLOGY | Facility: MEDICAL CENTER | Age: 28
End: 2023-12-12
Payer: COMMERCIAL

## 2023-12-12 VITALS
DIASTOLIC BLOOD PRESSURE: 72 MMHG | TEMPERATURE: 98.4 F | SYSTOLIC BLOOD PRESSURE: 112 MMHG | RESPIRATION RATE: 18 BRPM | BODY MASS INDEX: 31.55 KG/M2 | OXYGEN SATURATION: 99 % | HEIGHT: 67 IN | WEIGHT: 201 LBS | HEART RATE: 77 BPM

## 2023-12-12 DIAGNOSIS — M79.644 PAIN OF FINGER OF RIGHT HAND: ICD-10-CM

## 2023-12-12 PROCEDURE — 73140 X-RAY EXAM OF FINGER(S): CPT | Mod: RT

## 2023-12-12 PROCEDURE — 3078F DIAST BP <80 MM HG: CPT

## 2023-12-12 PROCEDURE — 3074F SYST BP LT 130 MM HG: CPT

## 2023-12-12 PROCEDURE — 26770 TREAT FINGER DISLOCATION: CPT | Mod: F4

## 2023-12-12 PROCEDURE — 73140 X-RAY EXAM OF FINGER(S): CPT | Mod: LT

## 2023-12-12 PROCEDURE — 99213 OFFICE O/P EST LOW 20 MIN: CPT | Mod: 25

## 2023-12-12 ASSESSMENT — FIBROSIS 4 INDEX: FIB4 SCORE: 0.47

## 2023-12-13 NOTE — PROGRESS NOTES
"Chief Complaint   Patient presents with    Finger Injury     Today L hand Pinky finger, closed finger on car door          Subjective:   HISTORY OF PRESENT ILLNESS: Shy Buchanan is a 28 y.o. female who presents for left pinky pain after slamming her finger in a car door about 30 min PTA     Medications, Allergies, current problem list, Social and Family history reviewed today in Epic.     Objective:     /72   Pulse 77   Temp 36.9 °C (98.4 °F) (Temporal)   Resp 18   Ht 1.702 m (5' 7\")   Wt 91.2 kg (201 lb)   SpO2 99%     Physical Exam  Vitals reviewed.   Constitutional:       Appearance: Normal appearance.   HENT:      Mouth/Throat:      Mouth: Mucous membranes are moist.   Cardiovascular:      Rate and Rhythm: Normal rate.   Pulmonary:      Effort: Pulmonary effort is normal.   Musculoskeletal:      Comments: Deformity noted to left 5th digit.    Skin:     General: Skin is warm and dry.   Neurological:      Mental Status: She is alert and oriented to person, place, and time.   Psychiatric:         Mood and Affect: Mood normal.          Assessment/Plan:     Diagnosis and associated orders    I personally reviewed prior external notes and test results pertinent to today's visit.     1. Pain of finger of right hand  DX-FINGER(S) 2+ RIGHT    DX-FINGER(S) 2+ LEFT        Today's radiology imaging personally reviewed by me today on day of visit and Radiology readings reviewed and discussed w/ patient today.     RADIOLOGY RESULTS   DX-FINGER(S) 2+ LEFT    Result Date: 12/12/2023 12/12/2023 5:58 PM HISTORY/REASON FOR EXAM:  Pain/Deformity Following Trauma. . TECHNIQUE/EXAM DESCRIPTION AND NUMBER OF VIEWS:  2 views of the LEFT fingers. COMPARISON: Exam from 5:29 PM FINDINGS: There has been interval reduction of fifth PIP dislocation. No fracture identified.     1.  Reduction of fifth PIP dislocation without evidence of fracture.    DX-FINGER(S) 2+ RIGHT    Result Date: 12/12/2023 12/12/2023 5:32 PM " HISTORY/REASON FOR EXAM:  Pain/Deformity Following Trauma. . TECHNIQUE/EXAM DESCRIPTION AND NUMBER OF VIEWS:  3 views of the RIGHT fingers. COMPARISON: None FINDINGS: Bone mineralization is age-appropriate. There is dislocation of the fifth phalanx at the proximal interphalangeal joint with superior displacement of the middle phalanx. No definite associated fracture although evaluation is limited.     Dislocation of the fifth phalanx at the proximal interphalangeal joint with superior displacement of the middle phalanx.             IMPRESSION:  Exam findings reassuring with stable vital signs, No red flag symptoms or exam findings. Initial xrays show  Dislocation of the fifth phalanx at the PIP joint with superior displacement.      Pt opted for no anesthesia for reduction, mild traction was placed on 5th digit with good results.  Re-xray shows finger is in good alignment without fracture.  Pt placed in splint, FU with PCP       Differential diagnosis discussed. Pt was Educated on red flag symptoms. Pt has been Instructed to return to Urgent Care or nearest Emergency Department if symptoms fail to improve, for any change in condition, further concerns, or new concerning symptoms. Patient states understanding of the plan of care and discharge instructions.  They are discharged in stable condition.         Please note that this dictation was created using voice recognition software. I have made a reasonable attempt to correct obvious errors, but I expect that there are errors of grammar and possibly content that I did not discover before finalizing the note.    This note was electronically signed by LAWRENCE Leahy

## 2024-01-28 ENCOUNTER — HOSPITAL ENCOUNTER (EMERGENCY)
Facility: MEDICAL CENTER | Age: 29
End: 2024-01-28
Attending: EMERGENCY MEDICINE
Payer: COMMERCIAL

## 2024-01-28 ENCOUNTER — APPOINTMENT (OUTPATIENT)
Dept: RADIOLOGY | Facility: MEDICAL CENTER | Age: 29
End: 2024-01-28
Attending: EMERGENCY MEDICINE
Payer: COMMERCIAL

## 2024-01-28 VITALS
OXYGEN SATURATION: 96 % | HEART RATE: 73 BPM | BODY MASS INDEX: 30.04 KG/M2 | SYSTOLIC BLOOD PRESSURE: 104 MMHG | TEMPERATURE: 98 F | RESPIRATION RATE: 14 BRPM | WEIGHT: 191.8 LBS | DIASTOLIC BLOOD PRESSURE: 55 MMHG

## 2024-01-28 DIAGNOSIS — R93.2 ABNORMAL ULTRASOUND OF LIVER: ICD-10-CM

## 2024-01-28 DIAGNOSIS — R11.2 NAUSEA AND VOMITING, UNSPECIFIED VOMITING TYPE: ICD-10-CM

## 2024-01-28 DIAGNOSIS — R10.11 RIGHT UPPER QUADRANT ABDOMINAL PAIN: ICD-10-CM

## 2024-01-28 LAB
ALBUMIN SERPL BCP-MCNC: 4.6 G/DL (ref 3.2–4.9)
ALBUMIN/GLOB SERPL: 1.3 G/DL
ALP SERPL-CCNC: 78 U/L (ref 30–99)
ALT SERPL-CCNC: 26 U/L (ref 2–50)
ANION GAP SERPL CALC-SCNC: 9 MMOL/L (ref 7–16)
AST SERPL-CCNC: 26 U/L (ref 12–45)
BASOPHILS # BLD AUTO: 0.3 % (ref 0–1.8)
BASOPHILS # BLD: 0.04 K/UL (ref 0–0.12)
BILIRUB SERPL-MCNC: 0.9 MG/DL (ref 0.1–1.5)
BUN SERPL-MCNC: 15 MG/DL (ref 8–22)
CALCIUM ALBUM COR SERPL-MCNC: 8.9 MG/DL (ref 8.5–10.5)
CALCIUM SERPL-MCNC: 9.4 MG/DL (ref 8.5–10.5)
CHLORIDE SERPL-SCNC: 103 MMOL/L (ref 96–112)
CO2 SERPL-SCNC: 23 MMOL/L (ref 20–33)
CREAT SERPL-MCNC: 0.82 MG/DL (ref 0.5–1.4)
EOSINOPHIL # BLD AUTO: 0.09 K/UL (ref 0–0.51)
EOSINOPHIL NFR BLD: 0.6 % (ref 0–6.9)
ERYTHROCYTE [DISTWIDTH] IN BLOOD BY AUTOMATED COUNT: 40.2 FL (ref 35.9–50)
GFR SERPLBLD CREATININE-BSD FMLA CKD-EPI: 99 ML/MIN/1.73 M 2
GLOBULIN SER CALC-MCNC: 3.5 G/DL (ref 1.9–3.5)
GLUCOSE SERPL-MCNC: 116 MG/DL (ref 65–99)
HCG SERPL QL: NEGATIVE
HCT VFR BLD AUTO: 47.4 % (ref 37–47)
HGB BLD-MCNC: 15.8 G/DL (ref 12–16)
IMM GRANULOCYTES # BLD AUTO: 0.06 K/UL (ref 0–0.11)
IMM GRANULOCYTES NFR BLD AUTO: 0.4 % (ref 0–0.9)
LIPASE SERPL-CCNC: 34 U/L (ref 11–82)
LYMPHOCYTES # BLD AUTO: 0.47 K/UL (ref 1–4.8)
LYMPHOCYTES NFR BLD: 3 % (ref 22–41)
MCH RBC QN AUTO: 27.7 PG (ref 27–33)
MCHC RBC AUTO-ENTMCNC: 33.3 G/DL (ref 32.2–35.5)
MCV RBC AUTO: 83.2 FL (ref 81.4–97.8)
MONOCYTES # BLD AUTO: 0.6 K/UL (ref 0–0.85)
MONOCYTES NFR BLD AUTO: 3.8 % (ref 0–13.4)
NEUTROPHILS # BLD AUTO: 14.43 K/UL (ref 1.82–7.42)
NEUTROPHILS NFR BLD: 91.9 % (ref 44–72)
NRBC # BLD AUTO: 0 K/UL
NRBC BLD-RTO: 0 /100 WBC (ref 0–0.2)
PLATELET # BLD AUTO: 311 K/UL (ref 164–446)
PMV BLD AUTO: 9.2 FL (ref 9–12.9)
POTASSIUM SERPL-SCNC: 4.2 MMOL/L (ref 3.6–5.5)
PROT SERPL-MCNC: 8.1 G/DL (ref 6–8.2)
RBC # BLD AUTO: 5.7 M/UL (ref 4.2–5.4)
SODIUM SERPL-SCNC: 135 MMOL/L (ref 135–145)
WBC # BLD AUTO: 15.7 K/UL (ref 4.8–10.8)

## 2024-01-28 PROCEDURE — 96375 TX/PRO/DX INJ NEW DRUG ADDON: CPT

## 2024-01-28 PROCEDURE — 80053 COMPREHEN METABOLIC PANEL: CPT

## 2024-01-28 PROCEDURE — 36415 COLL VENOUS BLD VENIPUNCTURE: CPT

## 2024-01-28 PROCEDURE — 84703 CHORIONIC GONADOTROPIN ASSAY: CPT

## 2024-01-28 PROCEDURE — 74176 CT ABD & PELVIS W/O CONTRAST: CPT

## 2024-01-28 PROCEDURE — 700102 HCHG RX REV CODE 250 W/ 637 OVERRIDE(OP): Performed by: EMERGENCY MEDICINE

## 2024-01-28 PROCEDURE — 83690 ASSAY OF LIPASE: CPT

## 2024-01-28 PROCEDURE — 85025 COMPLETE CBC W/AUTO DIFF WBC: CPT

## 2024-01-28 PROCEDURE — 76705 ECHO EXAM OF ABDOMEN: CPT

## 2024-01-28 PROCEDURE — A9270 NON-COVERED ITEM OR SERVICE: HCPCS | Performed by: EMERGENCY MEDICINE

## 2024-01-28 PROCEDURE — 96374 THER/PROPH/DIAG INJ IV PUSH: CPT

## 2024-01-28 PROCEDURE — 700105 HCHG RX REV CODE 258: Performed by: EMERGENCY MEDICINE

## 2024-01-28 PROCEDURE — 700111 HCHG RX REV CODE 636 W/ 250 OVERRIDE (IP): Mod: JZ | Performed by: EMERGENCY MEDICINE

## 2024-01-28 PROCEDURE — 99285 EMERGENCY DEPT VISIT HI MDM: CPT

## 2024-01-28 RX ORDER — ONDANSETRON 2 MG/ML
4 INJECTION INTRAMUSCULAR; INTRAVENOUS ONCE
Status: COMPLETED | OUTPATIENT
Start: 2024-01-28 | End: 2024-01-28

## 2024-01-28 RX ORDER — ONDANSETRON 4 MG/1
4 TABLET, ORALLY DISINTEGRATING ORAL EVERY 6 HOURS PRN
Qty: 10 TABLET | Refills: 0 | Status: SHIPPED | OUTPATIENT
Start: 2024-01-28 | End: 2024-02-07

## 2024-01-28 RX ORDER — OMEPRAZOLE 20 MG/1
20 CAPSULE, DELAYED RELEASE ORAL DAILY
Qty: 21 CAPSULE | Refills: 0 | Status: SHIPPED | OUTPATIENT
Start: 2024-01-28

## 2024-01-28 RX ORDER — LAMOTRIGINE 100 MG/1
100 TABLET ORAL ONCE
Status: COMPLETED | OUTPATIENT
Start: 2024-01-28 | End: 2024-01-28

## 2024-01-28 RX ORDER — SODIUM CHLORIDE 9 MG/ML
1000 INJECTION, SOLUTION INTRAVENOUS ONCE
Status: COMPLETED | OUTPATIENT
Start: 2024-01-28 | End: 2024-01-28

## 2024-01-28 RX ORDER — FAMOTIDINE 20 MG/1
20 TABLET, FILM COATED ORAL 2 TIMES DAILY
Qty: 42 TABLET | Refills: 0 | Status: SHIPPED | OUTPATIENT
Start: 2024-01-28 | End: 2024-02-18

## 2024-01-28 RX ORDER — ACETAMINOPHEN 10 MG/ML
1000 INJECTION, SOLUTION INTRAVENOUS ONCE
Status: COMPLETED | OUTPATIENT
Start: 2024-01-28 | End: 2024-01-28

## 2024-01-28 RX ADMIN — LAMOTRIGINE 100 MG: 100 TABLET ORAL at 11:21

## 2024-01-28 RX ADMIN — ACETAMINOPHEN 1000 MG: 1000 INJECTION INTRAVENOUS at 09:45

## 2024-01-28 RX ADMIN — SODIUM CHLORIDE 1000 ML: 9 INJECTION, SOLUTION INTRAVENOUS at 08:47

## 2024-01-28 RX ADMIN — ONDANSETRON 4 MG: 2 INJECTION INTRAMUSCULAR; INTRAVENOUS at 08:47

## 2024-01-28 ASSESSMENT — FIBROSIS 4 INDEX: FIB4 SCORE: 0.47

## 2024-01-28 NOTE — DISCHARGE INSTRUCTIONS
Please follow up with your primary care physician in 24 hours for abdominal recheck if your symptoms have not completely gone away. Call your primary care physician at the opening of business hours to let them know you were seen in the emergency department. Return immediately if your pain returns or worsens, if you develop any new symptoms, if you are not able to drink fluids, if you have persistent vomiting, if you develop fevers, or if you have any further concerns. Additionally, please return if your symptoms have not resolved and you are unable to follow up with your primary care physician for recheck.      As we discussed, there was a small abnormal area seen on the liver, please review this with your primary care physician who can order any additional testing that may be needed.

## 2024-01-28 NOTE — ED TRIAGE NOTES
Chief Complaint   Patient presents with    Abdominal Pain    N/V     Pt states constipated for 1 week.  Pt took laxative last night with good results but now has n/v, abd pain and diarrhea

## 2024-01-28 NOTE — ED NOTES
Pt discharged home as ordered by erp. Pt instructed to follow up with her PCP and return here as needed. Pt verbalized understanding. Pt left ambulating independently with her family

## 2024-01-28 NOTE — ED PROVIDER NOTES
Emergency Physician Note    Chief Concern:  Chief Complaint   Patient presents with    Abdominal Pain    N/V     Pt states constipated for 1 week.  Pt took laxative last night with good results but now has n/v, abd pain and diarrhea     HPI/ROS     External Records Reviewed:  Outpatient clinic notes reviewed: Ms. Buchanan was seen in primary care clinic 3/3/2023 to establish care.  Family medicine physician note reviewed from that visit.  She does not have a history of seizure disorder currently treated with Lamictal and followed by neurology.  She also has a history of bipolar disorder that was treated with cariprazine, followed by psychiatry.  Past medical history of hyperprolactinemia noted. No acute concerns identified at this visit.    HPI:  Shy Buchanan is a 28 y.o. female who presents to the emergency department today for evaluation of abdominal pain.  She has a history of constipation, over the last 2 to 3 days felt as though she may have some symptoms of constipation, she developed some abdominal pain which is not unusual for her.  She took 2 doses of MiraLAX without improvement of symptoms, then she took Dulcolax yesterday, and did have multiple bowel movements.  However she also developed abdominal pain, nausea, and vomiting overnight.  She describes pain localized to the upper abdomen and epigastric area which is not typical of her abdominal pain, she states usually her constipation related abdominal pain is in the lower abdomen and pelvic area.  She describes sweats, and severe pain overnight, she states she was unable to find a position of comfort.  She does have a history of seizure disorder and is concerned because she may have vomited her evening medications including her lamotrigine which she takes for seizures.  She has not had any seizure activity since onset of symptoms.     On review of systems, last night she felt warm and sweaty, did not check her temperature, uncertain as to whether or not  "she had a fever but felt as though she may be febrile.  She reports no associated chest pain, no shortness of breath, she states she did have some pain radiating to the back.  She has a past surgical history significant for appendectomy, no past surgical history significant for cholecystectomy.    PAST MEDICAL HISTORY  Past Medical History:   Diagnosis Date    Epilepsy (HCC)     Gynecological disorder     Endometriosis    Headache 12/31/2014    Healthy adult     Heart burn     Hypertension     off medication for 2 years    Indigestion     Lupus (HCC)     PONV (postoperative nausea and vomiting)     Rash 3/21/2019       SURGICAL HISTORY  Past Surgical History:   Procedure Laterality Date    APPENDECTOMY      GYN SURGERY      ovarian cyst \"drained\"       FAMILY HISTORY  Family History   Problem Relation Age of Onset    Lung Disease Mother     Hypertension Mother     Lung Disease Father     Lung Disease Sister     Hypertension Sister        SOCIAL HISTORY   reports that she has never smoked. She has never used smokeless tobacco. She reports that she does not currently use alcohol. She reports current drug use. Drugs: Marijuana and Inhaled.    CURRENT MEDICATIONS  Discharge Medication List as of 1/28/2024 11:19 AM        CONTINUE these medications which have NOT CHANGED    Details   atomoxetine (STRATTERA) 60 MG capsule Take 1 Capsule by mouth every morning., Historical Med      spironolactone (ALDACTONE) 25 MG Tab TAKE 1 TABLET BY MOUTH EVERY DAY FOR ACNE, Historical Med      Semaglutide,0.25 or 0.5MG/DOS, (OZEMPIC, 0.25 OR 0.5 MG/DOSE,) 2 MG/3ML Solution Pen-injector INJECT 0.25MG SUBCUTANEOUS ONE DAY A WEEK FOR 4 WEEKS, Historical Med      clonazePAM (KLONOPIN) 1 MG Tab TAKE 1/2-1 TABLET BY MOUTH IN A SINGLE DOSE AS NEEDED FOR PANIC, Historical Med      Clascoterone (WINLEVI) 1 % Cream Historical Med      traZODone (DESYREL) 50 MG Tab as needed., Historical Med      montelukast (SINGULAIR) 10 MG Tab Take 1 Tablet by " mouth every day for 360 days., Disp-90 Tablet, R-3, Normal      lamoTRIgine (LAMICTAL) 100 MG Tab Take 1 Tablet by mouth every day., Disp-30 Tablet, R-6, Normal      ondansetron (ZOFRAN ODT) 4 MG TABLET DISPERSIBLE Take 1 Tablet by mouth every 8 hours as needed for Nausea/Vomiting., Disp-10 Tablet, R-0, Normal      ibuprofen (MOTRIN) 800 MG Tab Take 1 Tablet by mouth every 8 hours as needed for Moderate Pain., Disp-90 Tablet, R-0, Normal      folic acid (FOLVITE) 1 MG Tab Take 2 Tablets by mouth every day., Disp-60 Tablet, R-11, Normal             ALLERGIES  Hydrocodone-acetaminophen, Vicodin [hydrocodone-acetaminophen], Contrast media with iodine [iodine], and Latex    PHYSICAL EXAM  Vital Signs: /55   Pulse 73   Temp 36.7 °C (98 °F)   Resp 14   Wt 87 kg (191 lb 12.8 oz)   SpO2 96%   BMI 30.04 kg/m²   Constitutional: Alert, no acute distress, afebrile  HENT: Normocephalic, atraumatic.  Cardiovascular: No tachycardia, regular rate and rhythm, no murmur appreciated  Pulmonary: No respiratory distress, normal work of breathing, no wheezing, no coarse breath sounds  Abdomen: Soft, mild discomfort on palpation of the epigastric area, no peritoneal signs, no rebound, no guarding, negative Fowler sign  Skin: Warm, dry, no rashes or lesions  Musculoskeletal: Normal range of motion in all extremities, no swelling or deformity noted  Neurologic: Alert, oriented, normal motor function, no speech deficits    Diagnostic Studies & Procedures    Labs:  All labs reviewed by me as noted below.    Radiology:  The attending Emergency Physician has independently interpreted the following imaging:  I independently interpreted the right upper quadrant ultrasound, did not appreciate any gallstones, no pericholecystic fluid.    CT-ABDOMEN-PELVIS W/O   Final Result         1. No acute inflammatory change in the abdomen or pelvis.      US-RUQ   Final Result      1. A 2.1 cm echogenic mass in the left hepatic lobe, nonspecific  but could be a hemangioma. It there is clinical concern, confirmation with MR abdomen with contrast on nonemergent basis.      2. No gallstone. No sonographic evidence acute cholecystitis.             Course and Medical Decision Making    Initial Assessment and Plan:  Ms. Buchanan presents to the emergency department today for evaluation of abdominal pain as documented above.  Symptoms began about 2 days ago, progressively worsening, no localized to the epigastric area and right upper quadrant.  She has no prior history of cholecystectomy.  History and physical examination raises concern for cholecystitis, cholelithiasis, or pancreatitis.  Of additional concern is that she may have vomited her antiepileptic medication last night.  She does not report associated flank pain, has no associated dysuria.    On laboratory evaluation CMP is entirely within normal limits, lipase is normal.  She has no liver enzyme elevation, normal bilirubin, no evidence of biliary obstruction.  hCG is negative.  White blood count is elevated to 15.7, which again raises concern for cholecystitis, though may also be reactive due to vomiting.  Differential demonstrates no abnormally elevated immature granulocytes, no bands resulted at this time, however there is a 91% neutrophil predominance.    Right upper quadrant ultrasound demonstrates nonspecific mass in the left hepatic lobe, likely the cause of her symptoms.  No evidence of cholecystitis, no gallstones identified.    She was treated with a dose of IV Tylenol, as well as IV Zofran.  Her home dose of lamotrigine was ordered, and after receiving antiemetic medication she was able to take this medication in the emergency department.  On reassessment after receiving these medications, nausea has resolved, however she continues to have ongoing abdominal pain.  Due to persistent pain with leukocytosis care was escalated to include CT imaging of the abdomen and pelvis as no etiology was found  with right upper quadrant ultrasound.  CT abdomen pelvis resulted with no acute inflammatory change.    At this time, etiology of her abdominal pain is not entirely clear.  It is possible that this may be related to acid reflux or GERD, will give a trial of PPI and H2 blocker.  Plan is for close outpatient follow-up, I will also provide follow-up information for gastroenterology clinics in [facility]. Return precautions were discussed with the patient, and provided in written form with the patient's discharge instructions.     Additional Problems and Disposition    Additional problems addressed:   1.  History of seizure disorder -she was able to take and tolerate her antiepileptic medications in the emergency department, may continue to follow-up with her neurologist on an as needed basis  2.  Left hepatic lobe mass -incidental finding seen on right upper quadrant ultrasound, outpatient follow-up recommended for nonemergent MRI imaging if indicated.  This finding was discussed with the patient.    Escalation of care considered, and ultimately not performed:   1.  Hospitalization -on arrival, hospitalization was initially considered based on symptoms, and elevated white blood count, however she responded well to therapeutic intervention, had no abnormal vital signs, had no fever.  Vomiting was controlled.  Given good response to therapeutic intervention, improvement of symptoms, and reassuring vital signs, believe she can be safely discharged home with close outpatient follow-up and return precautions.    Prescription medication considerations and management:  Famotidine, omeprazole, and ondansetron prescribed.    Disposition:  Discharged in stable condition    FINAL IMPRESSION   1. Nausea and vomiting, unspecified vomiting type    2. Right upper quadrant abdominal pain    3. Abnormal ultrasound of liver

## 2024-01-31 ENCOUNTER — APPOINTMENT (OUTPATIENT)
Dept: MEDICAL GROUP | Facility: LAB | Age: 29
End: 2024-01-31
Payer: COMMERCIAL

## 2024-02-01 ENCOUNTER — APPOINTMENT (OUTPATIENT)
Dept: MEDICAL GROUP | Facility: LAB | Age: 29
End: 2024-02-01
Payer: COMMERCIAL

## 2024-02-05 ENCOUNTER — APPOINTMENT (OUTPATIENT)
Dept: MEDICAL GROUP | Facility: LAB | Age: 29
End: 2024-02-05
Payer: COMMERCIAL

## 2024-02-07 ENCOUNTER — OFFICE VISIT (OUTPATIENT)
Dept: MEDICAL GROUP | Facility: LAB | Age: 29
End: 2024-02-07
Payer: COMMERCIAL

## 2024-02-07 VITALS
HEIGHT: 67 IN | SYSTOLIC BLOOD PRESSURE: 110 MMHG | BODY MASS INDEX: 30.61 KG/M2 | HEART RATE: 75 BPM | OXYGEN SATURATION: 100 % | DIASTOLIC BLOOD PRESSURE: 74 MMHG | WEIGHT: 195 LBS | RESPIRATION RATE: 16 BRPM | TEMPERATURE: 97.4 F

## 2024-02-07 DIAGNOSIS — K62.5 BRBPR (BRIGHT RED BLOOD PER RECTUM): ICD-10-CM

## 2024-02-07 DIAGNOSIS — K59.09 CHRONIC CONSTIPATION: ICD-10-CM

## 2024-02-07 PROCEDURE — 99214 OFFICE O/P EST MOD 30 MIN: CPT | Performed by: FAMILY MEDICINE

## 2024-02-07 PROCEDURE — 3078F DIAST BP <80 MM HG: CPT | Performed by: FAMILY MEDICINE

## 2024-02-07 PROCEDURE — 3074F SYST BP LT 130 MM HG: CPT | Performed by: FAMILY MEDICINE

## 2024-02-07 RX ORDER — SEMAGLUTIDE 1.34 MG/ML
INJECTION, SOLUTION SUBCUTANEOUS
COMMUNITY
End: 2024-02-07

## 2024-02-07 RX ORDER — TIRZEPATIDE 2.5 MG/.5ML
INJECTION, SOLUTION SUBCUTANEOUS
COMMUNITY
Start: 2024-01-31 | End: 2024-03-06

## 2024-02-07 RX ORDER — AMOXICILLIN 500 MG/1
TABLET, FILM COATED ORAL
COMMUNITY
Start: 2023-12-18

## 2024-02-07 RX ORDER — ALBUTEROL SULFATE 90 UG/1
AEROSOL, METERED RESPIRATORY (INHALATION)
COMMUNITY

## 2024-02-07 ASSESSMENT — FIBROSIS 4 INDEX: FIB4 SCORE: 0.48

## 2024-02-07 NOTE — PROGRESS NOTES
Subjective:   Shy Buchanan is a 28 y.o. female here today for   Chief Complaint   Patient presents with    GI Problem     Only on BRAT DIET, consistent headache, no migraine like.        # Gastroenteritis:  -Patient recently seen emergency department a week ago after experiencing significant symptoms of diarrhea, heaving, vomiting.  Patient states that the symptoms did start after using a laxative on top of MiraLAX for constipation.  Patient has a history of chronic constipation which got significantly worse over the last month.  -In the emergency department several tests were completed, all of which was unremarkable.  She was treated with omeprazole, famotidine and discharged.  -Patient states that since then she has had some improvement but continues to have significant bloating, constipation, abdominal pain, nausea.  She states has been difficult to eat any food and is on brat diet at this time.  -Recently she states that with the bowel movement she noticed bright red blood in toilet bowl.  She states that was enough where she thought she was having her period.  -Patient denies any heartburn, postprandial pain, difficulty swallowing, pain with swallowing, melena.    Allergies   Allergen Reactions    Hydrocodone-Acetaminophen Nausea    Vicodin [Hydrocodone-Acetaminophen] Vomiting    Contrast Media With Iodine [Iodine] Hives and Itching    Latex Hives, Itching and Rash         Current medicines (including changes today)  Current Outpatient Medications   Medication Sig Dispense Refill    Amoxicillin 500 MG Tab       ZEPBOUND 2.5 MG/0.5ML Solution Auto-injector       albuterol 108 (90 Base) MCG/ACT Aero Soln inhalation aerosol INHALE 2 PUFFS BY MOUTH EVERY 4 TO 6 HOURS AS NEEDED FOR SHORTNESS OF BREATH OR WHEEZING      omeprazole (PRILOSEC) 20 MG delayed-release capsule Take 1 Capsule by mouth every day. 21 Capsule 0    famotidine (PEPCID) 20 MG Tab Take 1 Tablet by mouth 2 times a day for 21 days. 42 Tablet 0  "   ondansetron (ZOFRAN ODT) 4 MG TABLET DISPERSIBLE Take 1 Tablet by mouth every 6 hours as needed for Nausea/Vomiting for up to 10 days. 10 Tablet 0    atomoxetine (STRATTERA) 60 MG capsule Take 1 Capsule by mouth every morning.      spironolactone (ALDACTONE) 25 MG Tab TAKE 1 TABLET BY MOUTH EVERY DAY FOR ACNE      clonazePAM (KLONOPIN) 1 MG Tab TAKE 1/2-1 TABLET BY MOUTH IN A SINGLE DOSE AS NEEDED FOR PANIC      Clascoterone (WINLEVI) 1 % Cream       traZODone (DESYREL) 50 MG Tab as needed.      montelukast (SINGULAIR) 10 MG Tab Take 1 Tablet by mouth every day for 360 days. 90 Tablet 3    lamoTRIgine (LAMICTAL) 100 MG Tab Take 1 Tablet by mouth every day. 30 Tablet 6    ibuprofen (MOTRIN) 800 MG Tab Take 1 Tablet by mouth every 8 hours as needed for Moderate Pain. 90 Tablet 0    folic acid (FOLVITE) 1 MG Tab Take 2 Tablets by mouth every day. 60 Tablet 11     No current facility-administered medications for this visit.     She  has a past medical history of Epilepsy (HCC), Gynecological disorder, Headache (12/31/2014), Healthy adult, Heart burn, Hypertension, Indigestion, Lupus (HCC), PONV (postoperative nausea and vomiting), and Rash (3/21/2019).    She has no past medical history of Hyperlipidemia.    ROS   -See HPI       Objective:     Physical Exam:  /74   Pulse 75   Temp 36.3 °C (97.4 °F) (Temporal)   Resp 16   Ht 1.702 m (5' 7.01\")   Wt 88.5 kg (195 lb)   SpO2 100%  Body mass index is 30.53 kg/m².   Constitutional: Alert, no distress.  Skin: Warm, dry, good turgor, no rashes in visible areas.  Eye: Equal, round and reactive, conjunctiva clear, lids normal.  Respiratory: Unlabored respiratory effort, lungs clear to auscultation, no wheezes, no rhonchi.  Cardiovascular: Normal S1, S2, no murmur, no edema.  Abdomen: Soft, tenderness to palpation in periumbilical area, left upper quadrant.  No masses, no hepatosplenomegaly.  Bowel sounds normal  Psych: Alert and oriented x3, normal affect and " mood.    Assessment and Plan:     1. Chronic constipation  -Discussed increasing MiraLAX from 1-2 caps daily.  Discussed continue the brat diet.  We will discontinue famotidine and continue the omeprazole given the patient not experiencing a lot of upper GI symptoms.  Patient has ongoing condition of chronic constipation for several years.  Because of this as well as symptoms of bright red blood with bowel movement further evaluation is needed we will refer patient to gastroenterology at this time.  - Referral to Gastroenterology    2. BRBPR (bright red blood per rectum)  -See #1  - Referral to Gastroenterology      Followup: No follow-ups on file.         PLEASE NOTE: This dictation was created using voice recognition software. I have made every reasonable attempt to correct obvious errors, but I expect that there are errors of grammar and possibly content that I did not discover before finalizing the note.

## 2024-03-04 NOTE — PROGRESS NOTES
Caller: Josiah Loomis    Relationship: Self    Best call back number: [phone]    What is the best time to reach you: ANYTIME     Who are you requesting to speak with (clinical staff, provider,  specific staff member): CLINICAL     What was the call regarding: PATIENT STATES THE PHARMACY IS WAITING FOR THE PRIOR AUTHORIZATION FOR THE OZEMPIC MEDICATION.    PATIENT STATES HE HAS BEEN OUT OF THIS MEDICATION FOR 4 WEEKS .     PATIENT IS REQUESTING A CALL BACK.      "Subjective:   Shy Buchanan is a 25 y.o. female here today for left wrist pain and eczema.     Left wrist pain  New to me; seems to be chronic left wrist pain that was previously intermittent, now becoming constant x3 weeks.   Having some shooting pain, worse with movement.   Hindering her from doing activities such as yoga.   Also have decreased  strength.     Denies neck pain.   Using IBU with some relief.         Rash  Follow up; flaring of eczema.   Has been using Mometasone prn which seemed to help in the past.   One lesion on the left side of her neck continues to be flared despite use of cream. This rash is more \"flaky\" and burns.        Current medicines (including changes today)  Current Outpatient Medications   Medication Sig Dispense Refill   • nortriptyline (PAMELOR) 10 MG Cap Take 10 mg by mouth.     • Crisaborole 2 % Ointment Apply a thin layer to affected area twice per day for 14 days. 100 g 1   • mometasone (ELOCON) 0.1 % Cream Apply to affected area BID x14 days. 50 g 1   • albuterol 108 (90 Base) MCG/ACT Aero Soln inhalation aerosol Inhale 2 Puffs every 6 hours as needed for Shortness of Breath. 8.5 g 2   • montelukast (SINGULAIR) 10 MG Tab Take 1 tablet by mouth every day for 90 days. 90 tablet 0   • lamoTRIgine (LAMICTAL) 100 MG Tab Take 1 Tab by mouth 2 times a day. 60 Tab 3   • folic acid (FOLVITE) 1 MG Tab Take 2 Tabs by mouth every day. 60 Tab 11   • Azelastine HCl 137 MCG/SPRAY Solution      • fluticasone (FLONASE) 50 MCG/ACT nasal spray      • ibuprofen (MOTRIN) 800 MG Tab Take 1 Tab by mouth every 8 hours as needed for Moderate Pain. 90 Tab 0   • ondansetron (ZOFRAN ODT) 4 MG TABLET DISPERSIBLE Take 1 Tab by mouth every 8 hours as needed for Nausea. 10 Tab 0     No current facility-administered medications for this visit.     She  has a past medical history of Healthy adult, Hypertension, and Lupus (HCC). She also has no past medical history of Asthma or Hyperlipidemia.    ROS " "  +rash  +left wrist pain   No chest pain, no shortness of breath, no abdominal pain       Objective:     /84 (BP Location: Left arm, Patient Position: Sitting, BP Cuff Size: Adult)   Pulse 90   Temp 36.7 °C (98.1 °F) (Temporal)   Resp 16   Ht 1.77 m (5' 9.69\")   Wt 82.2 kg (181 lb 4.8 oz)   SpO2 98%  Body mass index is 26.25 kg/m².   Physical Exam:  Constitutional: Alert, no distress.  Skin: Warm, dry, good turgor, large erythematous patch with white scaling located on the lateral aspect of the left neck.   Eye: Equal, round, conjunctiva clear, lids normal.  Neck: Trachea midline,  Respiratory: Unlabored respiratory effort,   LEFT WRIST: No swelling or erythema noted. Moderate TTP at the anatomical snuff box and into th 1st MCP. ROM in the wrist is normal. +finklestein's test.   Psych: Alert and oriented x3, normal affect and mood.        Assessment and Plan:   The following treatment plan was discussed    1. Left wrist pain  New to me; likely tendonitis.   OK to continue IBU/Tylenol prn pain  Encouraged her to ice the area, avoid exercises that exacerbate the pain.   Discussed wearing a thumb spica brace.   Referral placed to ortho for possible injection?  - REFERRAL TO HAND SURGERY    2. Psoriasis  Given the scaling nature of the rash on her neck, it appears to be more related to psoriasis than eczema.   Will do a trial of Eucrisa - she has used this in 04/2019 and had success with this.   Continue to monitor symptoms - she has an appointment with Derm 05/2021.   - Crisaborole 2 % Ointment; Apply a thin layer to affected area twice per day for 14 days.  Dispense: 100 g; Refill: 1    3. Tendonitis of wrist, left  - REFERRAL TO HAND SURGERY    4. Rash  See above.   - mometasone (ELOCON) 0.1 % Cream; Apply to affected area BID x14 days.  Dispense: 50 g; Refill: 1    5. Mild intermittent asthma, unspecified whether complicated  Follow up; chronic and stable on current regimen.   - albuterol 108 (90 Base) " MCG/ACT Aero Soln inhalation aerosol; Inhale 2 Puffs every 6 hours as needed for Shortness of Breath.  Dispense: 8.5 g; Refill: 2      Followup: Return if symptoms worsen or fail to improve.       Nohemy Cooper P.A.-C.  Supervising MD: Dr. Warner Dolan MD  04/22/21

## 2024-03-06 ENCOUNTER — OFFICE VISIT (OUTPATIENT)
Dept: MEDICAL GROUP | Facility: LAB | Age: 29
End: 2024-03-06
Payer: COMMERCIAL

## 2024-03-06 VITALS
HEART RATE: 88 BPM | RESPIRATION RATE: 16 BRPM | OXYGEN SATURATION: 93 % | DIASTOLIC BLOOD PRESSURE: 68 MMHG | WEIGHT: 190 LBS | SYSTOLIC BLOOD PRESSURE: 98 MMHG | TEMPERATURE: 97.7 F | HEIGHT: 67 IN | BODY MASS INDEX: 29.82 KG/M2

## 2024-03-06 DIAGNOSIS — K59.09 CHRONIC CONSTIPATION: ICD-10-CM

## 2024-03-06 PROCEDURE — 3074F SYST BP LT 130 MM HG: CPT | Performed by: FAMILY MEDICINE

## 2024-03-06 PROCEDURE — 99213 OFFICE O/P EST LOW 20 MIN: CPT | Performed by: FAMILY MEDICINE

## 2024-03-06 PROCEDURE — 3078F DIAST BP <80 MM HG: CPT | Performed by: FAMILY MEDICINE

## 2024-03-06 ASSESSMENT — ENCOUNTER SYMPTOMS
FEVER: 0
HEARTBURN: 0
CHILLS: 0

## 2024-03-06 ASSESSMENT — FIBROSIS 4 INDEX: FIB4 SCORE: 0.48

## 2024-03-06 NOTE — LETTER
March 6, 2024       Patient: Shy Buchanan   YOB: 1995   Date of Visit: 3/6/2024         To Whom It May Concern:    In my medical opinion, I recommend that Shy Buchanan continue IV hydration therapy for treatment of dehydration and constipation as needed by patient.     If you have any questions or concerns, please don't hesitate to call [phone]          Sincerely,          Salomon Chauhan M.D.

## 2024-03-06 NOTE — PROGRESS NOTES
Verbal consent was acquired by the patient to use [employer] ambient listening note generation during this visit Yes    Subjective:   Shy Buchanan is a 28 y.o. female here today for   Chief Complaint   Patient presents with    Letter for School/Work     IV therapy As needed,      History of Present Illness  The patient is a 31-year-old female, who she/her pronouns, here to discuss chronic constipation. She has been treating chronic constipation with MiraLAX, fiber as well as IV hydration therapy. She states this has been helping significantly and is here to request letter of medical necessity for IV therapy for treatment of constipation.    She has an appointment with GI in 3 weeks. She is still not fully regular. This morning, she woke up at 4 a.m. and was in the bathroom for another 1.5 hours. She is constipated, but then has soft stool. She added Metamucil. She is getting good fiber, but she is still not able to find her gruble, especially during her ovulation or period. She is already getting an ultrasound. She went to see her gynecologist for pelvic floor therapy. She has a lot of pressure when she is having sex too between the discomfort of her constipation and then the pain. She has noticed blood in her stool only when she has been straining, but not persistent. She has been using IV therapy whenever she is about going into her period for the past 6 months, which really helps. She does it about monthly. MiraLAX is helping, but she is uncomfortable at night. She is taking 2 capfuls of MiraLAX daily. She has been off of Ozempic since before the new year. She never started the other medication that was recommended. She is still not losing weight, but she is gaining muscles. Her blood pressure is good. 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 She is getting a lot of abdominal pain, which improves with bowel movement. She denies any pain with urination. She has urinary  "incontinence sometimes when she sneezes.      Allergies   Allergen Reactions    Hydrocodone-Acetaminophen Nausea    Vicodin [Hydrocodone-Acetaminophen] Vomiting    Contrast Media With Iodine [Iodine] Hives and Itching    Latex Hives, Itching and Rash         Current medicines (including changes today)  Current Outpatient Medications   Medication Sig Dispense Refill    albuterol 108 (90 Base) MCG/ACT Aero Soln inhalation aerosol INHALE 2 PUFFS BY MOUTH EVERY 4 TO 6 HOURS AS NEEDED FOR SHORTNESS OF BREATH OR WHEEZING      Amoxicillin 500 MG Tab       omeprazole (PRILOSEC) 20 MG delayed-release capsule Take 1 Capsule by mouth every day. 21 Capsule 0    atomoxetine (STRATTERA) 60 MG capsule Take 1 Capsule by mouth every morning.      clonazePAM (KLONOPIN) 1 MG Tab TAKE 1/2-1 TABLET BY MOUTH IN A SINGLE DOSE AS NEEDED FOR PANIC      Clascoterone (WINLEVI) 1 % Cream       traZODone (DESYREL) 50 MG Tab as needed.      lamoTRIgine (LAMICTAL) 100 MG Tab Take 1 Tablet by mouth every day. 30 Tablet 6    ibuprofen (MOTRIN) 800 MG Tab Take 1 Tablet by mouth every 8 hours as needed for Moderate Pain. 90 Tablet 0    folic acid (FOLVITE) 1 MG Tab Take 2 Tablets by mouth every day. 60 Tablet 11     No current facility-administered medications for this visit.     She  has a past medical history of Epilepsy ([facility]), Gynecological disorder, Headache (12/31/2014), Healthy adult, Heart burn, Hypertension, Indigestion, Lupus (HCC), PONV (postoperative nausea and vomiting), and Rash (3/21/2019).    She has no past medical history of Hyperlipidemia.    ROS   Review of Systems   Constitutional:  Negative for chills and fever.   Gastrointestinal:  Negative for heartburn and melena.     -See HPI     Objective:     Physical Exam:  BP 98/68   Pulse 88   Temp 36.5 °C (97.7 °F) (Temporal)   Resp 16   Ht 1.702 m (5' 7.01\")   Wt 86.2 kg (190 lb)   SpO2 93%  Body mass index is 29.75 kg/m².   Constitutional: Alert, no distress.  Skin: Warm, dry, " good turgor, no rashes in visible areas.  Eye: Equal, round and reactive, conjunctiva clear, lids normal.  Respiratory: Unlabored respiratory effort  Abdomen: Soft, non-tender, no masses, no hepatosplenomegaly.  Psych: Alert and oriented x3, normal affect and mood.    Results      Assessment and Plan:     Assessment & Plan  1. Chronic constipation.  . I will get a letter of medical necessity for IV therapy. She will continue water, fiber, and exercise. She will continue pelvic floor therapy. She will need a colonoscopy.  There is some concerns that this could potentially be irritable bowel syndrome.  After colonoscopy patient will follow-up in a week and discuss other potential treatments for IBS.    Follow-up  She will follow up with GI in 3 weeks.    Orders:  There are no diagnoses linked to this encounter.    Followup: No follow-ups on file.         PLEASE NOTE: This dictation was created using voice recognition and [employer] ambient listening software. I have made every reasonable attempt to correct obvious errors, but I expect that there are errors of grammar and possibly content that I did not discover before finalizing the note.

## 2024-03-18 ENCOUNTER — TELEPHONE (OUTPATIENT)
Dept: MEDICAL GROUP | Facility: LAB | Age: 29
End: 2024-03-18
Payer: COMMERCIAL

## 2024-03-18 NOTE — TELEPHONE ENCOUNTER
Received VM from [facility] billing and coding, requesting clinical notes regarding MRI brain.   Request them to be faxed to [phone]        Faxed office note 03/15/2023

## 2024-03-31 NOTE — TELEPHONE ENCOUNTER
Received request via: Pharmacy    Was the patient seen in the last year in this department? Yes  LOV : 3/6/2024   Does the patient have an active prescription (recently filled or refills available) for medication(s) requested? No    Pharmacy Name: [facility]     Does the patient have correction Plus and need 100 day supply (blood pressure, diabetes and cholesterol meds only)? Patient does not have SCP

## 2024-04-01 RX ORDER — MONTELUKAST SODIUM 10 MG/1
10 TABLET ORAL
Qty: 90 TABLET | Refills: 3 | Status: SHIPPED | OUTPATIENT
Start: 2024-04-01

## 2024-06-06 ENCOUNTER — APPOINTMENT (OUTPATIENT)
Dept: PHARMACY | Facility: MEDICAL CENTER | Age: 29
End: 2024-06-06
Payer: COMMERCIAL

## 2024-12-18 NOTE — HPI: FULL BODY SKIN EXAMINATION
Left message regarding the 12/31 appointment that was made via [employer]. Mom wrote in the appointment notes that Benjie needs a flu shot for school, and mom would like to meet Dr. Thurman. When mom calls back, please let her know that we would need to establish Benjie as a patient before we can administer immunizations - and we will need his records before his first appointment. Asked mom for a call back to discuss.  
How Severe Are Your Spot(S)?: moderate
What Type Of Note Output Would You Prefer (Optional)?: Standard Output
What Is The Reason For Today's Visit?: Full Body Skin Examination
What Is The Reason For Today's Visit? (Being Monitored For X): concerning skin lesions on an annual basis
Additional History: FBE. New patient.

## 2025-03-14 ENCOUNTER — APPOINTMENT (OUTPATIENT)
Dept: URGENT CARE | Facility: PHYSICIAN GROUP | Age: 30
End: 2025-03-14
Payer: COMMERCIAL

## 2025-03-17 ENCOUNTER — OFFICE VISIT (OUTPATIENT)
Dept: URGENT CARE | Facility: PHYSICIAN GROUP | Age: 30
End: 2025-03-17
Payer: COMMERCIAL

## 2025-03-17 VITALS
OXYGEN SATURATION: 98 % | HEIGHT: 67 IN | BODY MASS INDEX: 33.56 KG/M2 | HEART RATE: 80 BPM | RESPIRATION RATE: 16 BRPM | DIASTOLIC BLOOD PRESSURE: 74 MMHG | SYSTOLIC BLOOD PRESSURE: 108 MMHG | WEIGHT: 213.85 LBS | TEMPERATURE: 97.3 F

## 2025-03-17 DIAGNOSIS — J32.9 BACTERIAL SINUSITIS: ICD-10-CM

## 2025-03-17 DIAGNOSIS — B96.89 BACTERIAL SINUSITIS: ICD-10-CM

## 2025-03-17 LAB — S PYO DNA SPEC NAA+PROBE: NOT DETECTED

## 2025-03-17 PROCEDURE — 99213 OFFICE O/P EST LOW 20 MIN: CPT

## 2025-03-17 PROCEDURE — 87651 STREP A DNA AMP PROBE: CPT

## 2025-03-17 RX ORDER — DEXAMETHASONE SODIUM PHOSPHATE 10 MG/ML
10 INJECTION, SOLUTION INTRA-ARTICULAR; INTRALESIONAL; INTRAMUSCULAR; INTRAVENOUS; SOFT TISSUE ONCE
Status: COMPLETED | OUTPATIENT
Start: 2025-03-17 | End: 2025-03-17

## 2025-03-17 RX ADMIN — DEXAMETHASONE SODIUM PHOSPHATE 10 MG: 10 INJECTION, SOLUTION INTRA-ARTICULAR; INTRALESIONAL; INTRAMUSCULAR; INTRAVENOUS; SOFT TISSUE at 10:09

## 2025-03-17 ASSESSMENT — VISUAL ACUITY: OU: 1

## 2025-03-17 ASSESSMENT — FIBROSIS 4 INDEX: FIB4 SCORE: 0.49

## 2025-03-17 NOTE — PROGRESS NOTES
"Chief Complaint   Patient presents with    Conjunctivitis     C/o pink eye b/l, first started in RT eye on Friday but then developed in [facility] eye on Saturday. Has been using ofloxacin 0.3% eye drops that were rx'ed to her son for his pink eye.     Pharyngitis     X10 days. C/o fatigue, b/l ear pressure, chills, nausea. Tried tylenol sinus and headache, none taken today.          Subjective:   HISTORY OF PRESENT ILLNESS: Shy Buchanan is a 29 y.o. female who presents for sore throat and drainage from her bilateral eyes.  She states these symptoms have been persistent for about 10 days after what she thought was a cold.  Her son recently had pink eye and was given ofloxacin drops.  She began those 3 days ago and her eyes have improved.  Her sore throat persist.  Also has severe sinus pressure, bilateral ear pressure and nasal congestion.     Patient denies recent fever sor SOB    Medications, Allergies, current problem list, Social and Family history reviewed today in Epic.     Objective:     /74 (BP Location: Right arm, Patient Position: Sitting, BP Cuff Size: Adult long)   Pulse 80   Temp 36.3 °C (97.3 °F) (Temporal)   Resp 16   Ht 1.702 m (5' 7\")   Wt 97 kg (213 lb 13.5 oz)   SpO2 98%     Physical Exam  Vitals reviewed.   Constitutional:       General: She is not in acute distress.     Appearance: Normal appearance. She is not ill-appearing.   HENT:      Head: Normocephalic.      Right Ear: Tympanic membrane normal. No middle ear effusion. No mastoid tenderness.      Left Ear: Tympanic membrane normal.  No middle ear effusion. No mastoid tenderness.      Nose: Congestion present.      Right Turbinates: Swollen.      Left Turbinates: Swollen.      Right Sinus: Maxillary sinus tenderness and frontal sinus tenderness present.      Left Sinus: Maxillary sinus tenderness and frontal sinus tenderness present.      Mouth/Throat:      Mouth: Mucous membranes are moist.   Eyes:      General: Lids are normal. " Lids are everted, no foreign bodies appreciated. Vision grossly intact.         Right eye: Discharge present. No foreign body.         Left eye: Discharge present.No foreign body.      Conjunctiva/sclera:      Right eye: Right conjunctiva is injected. No chemosis.  Cardiovascular:      Rate and Rhythm: Normal rate.   Pulmonary:      Effort: Pulmonary effort is normal. No respiratory distress.      Breath sounds: Normal breath sounds.   Musculoskeletal:      Cervical back: Full passive range of motion without pain and normal range of motion.   Lymphadenopathy:      Cervical: No cervical adenopathy.   Skin:     General: Skin is warm and dry.   Neurological:      Mental Status: She is alert and oriented to person, place, and time.   Psychiatric:         Mood and Affect: Mood normal.          Assessment/Plan:     Diagnosis and associated orders    I personally reviewed prior external notes and test results pertinent to today's visit.     1. Bacterial sinusitis  POCT CEPHEID GROUP A STREP - PCR    dexamethasone (Decadron) injection (check route below) 10 mg    amoxicillin-clavulanate (AUGMENTIN) 875-125 MG Tab        Results for orders placed or performed in visit on 03/17/25   POCT CEPHEID GROUP A STREP - PCR    Collection Time: 03/17/25  9:55 AM   Result Value Ref Range    POC Group A Strep, PCR Not Detected Not Detected, Invalid        IMPRESSION: The patient is well appearing here with reassuring exam and vitals signs. Symptomatology is consistent with bacterial sinusitis given the length of symptoms.   Advised to start antibiotics and add Flonase nasal spray and a OTC nasal decongestant.       Discussed anticipated duration of illness, return to work/school, and indications to RTC or go to the Emergency department.    Patient states understanding of the plan of care and discharge instructions.  They are discharged in stable condition.         Please note that this dictation was created using voice recognition  software. I have made a reasonable attempt to correct obvious errors, but I expect that there are errors of grammar and possibly content that I did not discover before finalizing the note.    This note was electronically signed by LAWRENCE Leahy

## 2025-03-17 NOTE — LETTER
March 17, 2025    To Whom It May Concern:         This is confirmation that Shytiesha Buchanan attended her scheduled appointment with LAWRENCE Leahy on 3/17/25.  Should not fly for the next 3 days         If you have any questions please do not hesitate to call me at the phone number listed below.    Sincerely,          BERONIAC Leahy.  [phone]

## 2025-06-05 ENCOUNTER — APPOINTMENT (OUTPATIENT)
Dept: MEDICAL GROUP | Facility: LAB | Age: 30
End: 2025-06-05
Payer: COMMERCIAL

## 2025-06-06 ENCOUNTER — OFFICE VISIT (OUTPATIENT)
Dept: MEDICAL GROUP | Facility: LAB | Age: 30
End: 2025-06-06
Payer: COMMERCIAL

## 2025-06-06 VITALS
WEIGHT: 208 LBS | SYSTOLIC BLOOD PRESSURE: 110 MMHG | TEMPERATURE: 98.4 F | RESPIRATION RATE: 16 BRPM | BODY MASS INDEX: 32.65 KG/M2 | HEIGHT: 67 IN | OXYGEN SATURATION: 98 % | DIASTOLIC BLOOD PRESSURE: 82 MMHG | HEART RATE: 102 BPM

## 2025-06-06 DIAGNOSIS — F90.9 ATTENTION DEFICIT HYPERACTIVITY DISORDER (ADHD), UNSPECIFIED ADHD TYPE: ICD-10-CM

## 2025-06-06 DIAGNOSIS — Z00.00 ANNUAL PHYSICAL EXAM: Primary | ICD-10-CM

## 2025-06-06 DIAGNOSIS — Z13.6 SCREENING FOR CARDIOVASCULAR CONDITION: ICD-10-CM

## 2025-06-06 DIAGNOSIS — J45.40 MODERATE PERSISTENT ASTHMA WITHOUT COMPLICATION: ICD-10-CM

## 2025-06-06 DIAGNOSIS — F31.9 BIPOLAR DISEASE, CHRONIC (HCC): ICD-10-CM

## 2025-06-06 DIAGNOSIS — R56.9 SEIZURE (HCC): ICD-10-CM

## 2025-06-06 DIAGNOSIS — N92.1 MENORRHAGIA WITH IRREGULAR CYCLE: ICD-10-CM

## 2025-06-06 DIAGNOSIS — L30.0 NUMMULAR ECZEMA: ICD-10-CM

## 2025-06-06 DIAGNOSIS — E22.1 HYPERPROLACTINEMIA (HCC): ICD-10-CM

## 2025-06-06 PROCEDURE — 99395 PREV VISIT EST AGE 18-39: CPT | Performed by: FAMILY MEDICINE

## 2025-06-06 PROCEDURE — 3079F DIAST BP 80-89 MM HG: CPT | Performed by: FAMILY MEDICINE

## 2025-06-06 PROCEDURE — 3074F SYST BP LT 130 MM HG: CPT | Performed by: FAMILY MEDICINE

## 2025-06-06 RX ORDER — TRIAMCINOLONE ACETONIDE 1 MG/G
CREAM TOPICAL
Qty: 80 G | Refills: 3 | Status: SHIPPED | OUTPATIENT
Start: 2025-06-06

## 2025-06-06 RX ORDER — PREDNISONE 20 MG/1
40 TABLET ORAL DAILY
Qty: 10 TABLET | Refills: 0 | Status: SHIPPED | OUTPATIENT
Start: 2025-06-06 | End: 2025-06-11

## 2025-06-06 RX ORDER — ATOMOXETINE 60 MG/1
60 CAPSULE ORAL EVERY MORNING
Qty: 90 CAPSULE | Refills: 3 | Status: SHIPPED | OUTPATIENT
Start: 2025-06-06

## 2025-06-06 ASSESSMENT — ENCOUNTER SYMPTOMS
BLOOD IN STOOL: 0
SHORTNESS OF BREATH: 0
VOMITING: 0
CONSTIPATION: 0
PALPITATIONS: 0
DEPRESSION: 0
ABDOMINAL PAIN: 0
BLURRED VISION: 0
NERVOUS/ANXIOUS: 0
INSOMNIA: 0
WHEEZING: 0
DIARRHEA: 0

## 2025-06-06 ASSESSMENT — FIBROSIS 4 INDEX: FIB4 SCORE: 0.49

## 2025-06-06 NOTE — PROGRESS NOTES
"Verbal consent was acquired by the patient to use [employer] ambient listening note generation during this visit Yes    Subjective:   Shy Buchanan is a 29 y.o. female here today for   Chief Complaint   Patient presents with    Annual Exam    Rash       History of Present Illness  The patient is a 29-year-old female who presents today for an annual exam as well as evaluation of a rash.    She has been experiencing a persistent rash for the past 7 years, which initially appeared on her face but has since spread to her breasts and hip. The rash is characterized by hyperpigmentation and is associated with pain and itching. She has been managing the rash with lotion and has previously used aloe, cold compresses, and topical Benadryl, although the latter caused a burning sensation. She also reports scarring in the affected areas. She has a history of seasonal allergies, which have been particularly severe this year, and she recently recovered from a viral infection contracted in [place]. She does not report any correlation between her allergies and the rash. She has undergone allergy testing in the past and has discussed the possibility of allergy injections with her allergist. She has previously used Singulair and Flonase, both of which were beneficial.    She has been off birth control for over a year and is currently not taking any medications. She has noticed an increase in anxiety since discontinuing her ADHD medication, Strattera, and is considering resuming it.    Patient does have history of Bipolar.  She has been off lamotrigine for 3 months and reports feeling \"spacey.\" She has a history of seizures and plans to consult her neurologist regarding her current symptoms. S    he has missed one menstrual period but does not believe she is pregnant. She reports heavy menstrual bleeding and irregular cycles.     She has been engaging in regular exercise and maintaining a healthy diet. She has experienced weight " fluctuations, with her highest recorded weight being 220 to 225 pounds, but she has since lost weight and is currently fluctuating between 190 and 202 pounds. She has a family history of weight issues. She reports no chest pain, shortness of breath, difficulty breathing, vision or hearing concerns, new sexual partners, concerns for STD screening, chronic abdominal pain, or constipation. She is sleeping well and regularly sees a dentist. She is taking a prenatal multivitamin that contains folic acid.    She reports feeling achy and experiences hand pain, which she attributes to arthritis. She has been advised to undergo surgery for trigger finger but reports that her symptoms have been worsening. She uses Voltaren gel for relief but reports difficulty gripping objects and lifting weights. She experiences sharp, shooting pain in her hands upon waking up but does not report any numbness. She also reports lower back spasms and sciatica, which she believes are due to sports injuries. She has recently started experiencing ankle pain. She has been performing exercises recommended by her hand therapist and finds that massaging her hands provides relief.    GYNECOLOGICAL HISTORY:  - Frequency and Flow: Heavy menstrual bleeding, irregular cycles    SOCIAL HISTORY  - Does not smoke  - Does not drink alcohol  - Uses CBD oil daily in the morning        Allergies[1]      Current medicines (including changes today)  Current Medications[2]  She  has a past medical history of Allergy, Anxiety, Asthma, Depression, Epilepsy (HCC), Gynecological disorder, Headache (12/31/2014), Healthy adult, Heart burn, Hypertension, Indigestion, Lupus, PONV (postoperative nausea and vomiting), and Rash (03/21/2019).    She has no past medical history of Hyperlipidemia.    ROS   Review of Systems   HENT:  Negative for hearing loss.    Eyes:  Negative for blurred vision.   Respiratory:  Negative for shortness of breath and wheezing.    Cardiovascular:   "Negative for chest pain and palpitations.   Gastrointestinal:  Negative for abdominal pain, blood in stool, constipation, diarrhea and vomiting.   Psychiatric/Behavioral:  Negative for depression. The patient is not nervous/anxious and does not have insomnia.    -See HPI     Objective:     Physical Exam:  /82 (BP Location: Left arm, Patient Position: Sitting, BP Cuff Size: Large adult)   Pulse (!) 102   Temp 36.9 °C (98.4 °F) (Temporal)   Resp 16   Ht 1.705 m (5' 7.13\")   Wt 94.3 kg (208 lb)   SpO2 98%  Body mass index is 32.46 kg/m².   Constitutional: Alert, no distress.  Skin: Warm, dry, good turgor, no rashes in visible areas.  Circular, erythematous macular lesions noted on face, right breast, right hip consistent with nummular eczema.  Eye: Equal, round and reactive, conjunctiva clear, lids normal.  ENMT: TM's clear bilaterally, lips without lesions, good dentition, oropharynx clear.  Neck: Trachea midline, no masses, no thyromegaly. No cervical or supraclavicular lymphadenopathy.  Respiratory: Unlabored respiratory effort, lungs clear to auscultation, no wheezes, no rhonchi.  Cardiovascular: Normal S1, S2, no murmur, no edema.  Abdomen: Soft, non-tender, no masses, no hepatosplenomegaly.  Psych: Alert and oriented x3, normal affect and mood.    Results      Assessment and Plan:     Assessment & Plan  Annual   -All questions concerns were answered at this time.  -Vaccinations/screenings needed at this time: Up-to-date.  No concerns.  -Labs reviewed, concerns, check labs as discussed below  -Discussed the importance of a healthy, Mediterranean style diet, routine exercise regimen.  -Discussed general safety measures including seatbelts, helmets, avoidance of smoking, sunscreen, hydration.  -Follow-up for general physical exam on a yearly basis, sooner if needed.    Nummular eczema  The patient's symptoms align with a diagnosis of nummular eczema, a variant of eczema that can be mistaken for " ringworm.  Diagnostic plan: A comprehensive blood work panel will be ordered to assess hormone levels, thyroid function, prolactin levels, and lipoprotein A.  Treatment plan: A high-dose course of prednisone will be initiated for 5 days to manage the eczema. Additionally, a steroid cream will be prescribed for topical application twice daily. Daily intake of Zyrtec is recommended. The patient is advised to continue using Flonase and to maintain adequate skin hydration.  Clinical decision making: If the prolactin levels continue to rise, indicating a potential prolactinoma, an MRI of the brain may be considered.    Bipolar disorder  The patient has been off Lamictal (Lamotrigine) for 3 months and reports feeling spacey.  Treatment plan: It is crucial to resume this medication to manage her condition.  Clinical decision making: The patient will follow up with her neurologist to discuss restarting Lamictal and any potential side effects. Monitoring for any adverse effects or changes in symptoms will be necessary.    Attention deficit hyperactivity disorder (ADHD)  The patient has noticed increased anxiety and difficulty focusing since discontinuing Strattera.  Treatment plan: Strattera will be reintroduced to manage her ADHD symptoms.  Clinical decision making: Potential side effects such as gastrointestinal issues were discussed. The patient will be monitored for any side effects and effectiveness of the medication.    Asthma  The patient's asthma has worsened, requiring more frequent use of her inhaler.  Treatment plan: Keeping allergies under control with Zyrtec and Flonase may help manage her asthma symptoms.  Clinical decision making: If symptoms persist, Singulair may be considered. The patient will be monitored for any changes in asthma symptoms and response to treatment.    Arthritis  The patient reports significant hand pain and inflammation, consistent with arthritis.  Treatment plan: She is advised to  continue using Voltaren gel and to take two extra strength Tylenol before engaging in activities that exacerbate her symptoms. Consistent physical therapy and strengthening exercises are recommended.  Clinical decision making: Monitoring for any changes in symptoms or progression of arthritis will be necessary.    Irregular menstrual cycles  The patient reports heavy and irregular menstrual cycles, which may be influenced by hormonal changes and elevated prolactin levels.  Clinical decision making: She is advised to follow up with her OB/GYN for further evaluation and management. Monitoring for any changes in menstrual cycles and related symptoms will be necessary.    Hyperprolactinemia  Seen on previous labs.  Previous MRI completed several years ago was unremarkable.  Patient is having episodes of menorrhagia with irregular menstrual cycle.  Will recheck prolactin levels at this time.    Orders:  1. Annual physical exam  - CBC WITHOUT DIFFERENTIAL; Future  - Comp Metabolic Panel; Future    2. Nummular eczema  - triamcinolone acetonide (KENALOG) 0.1 % Cream; Apply to the affected area twice a day  Dispense: 80 g; Refill: 3  - predniSONE (DELTASONE) 20 MG Tab; Take 2 Tablets by mouth every day for 5 days.  Dispense: 10 Tablet; Refill: 0    3. Bipolar disease, chronic (HCC)    4. Moderate persistent asthma without complication    5. Attention deficit hyperactivity disorder (ADHD), unspecified ADHD type  - atomoxetine (STRATTERA) 60 MG capsule; Take 1 Capsule by mouth every morning.  Dispense: 90 Capsule; Refill: 3    6. Hyperprolactinemia (HCC)  - PROLACTIN; Future    7. Menorrhagia with irregular cycle  - TSH WITH REFLEX TO FT4; Future  - PROLACTIN; Future    8. Seizure (HCC)    9. Screening for cardiovascular condition  - Lipid Profile; Future  - Lipoprotein (a); Future        Followup: No follow-ups on file.         PLEASE NOTE: This dictation was created using voice recognition and [employer] ambient listening  software. I have made every reasonable attempt to correct obvious errors, but I expect that there are errors of grammar and possibly content that I did not discover before finalizing the note.         [1]   Allergies  Allergen Reactions    Hydrocodone-Acetaminophen Vomiting and Nausea    Contrast Media With Iodine [Iodine] Hives and Itching    Latex Hives and Itching   [2]   Current Outpatient Medications   Medication Sig Dispense Refill    albuterol 108 (90 Base) MCG/ACT Aero Soln inhalation aerosol INHALE 2 PUFFS BY MOUTH EVERY 4 TO 6 HOURS AS NEEDED FOR SHORTNESS OF BREATH OR WHEEZING      Semaglutide (WEGOVY) 0.25 MG/0.5ML Solution Auto-injector Pen-injector Inject 0.25 mg under the skin every 7 days.      montelukast (SINGULAIR) 10 MG Tab TAKE 1 TABLET BY MOUTH EVERY DAY (Patient not taking: Reported on 3/17/2025) 90 Tablet 3    omeprazole (PRILOSEC) 20 MG delayed-release capsule Take 1 Capsule by mouth every day. 21 Capsule 0    clonazePAM (KLONOPIN) 1 MG Tab TAKE 1/2-1 TABLET BY MOUTH IN A SINGLE DOSE AS NEEDED FOR PANIC      Clascoterone (WINLEVI) 1 % Cream  (Patient not taking: Reported on 3/17/2025)      traZODone (DESYREL) 50 MG Tab as needed. (Patient not taking: Reported on 3/17/2025)      lamoTRIgine (LAMICTAL) 100 MG Tab Take 1 Tablet by mouth every day. 30 Tablet 6    ibuprofen (MOTRIN) 800 MG Tab Take 1 Tablet by mouth every 8 hours as needed for Moderate Pain. 90 Tablet 0    folic acid (FOLVITE) 1 MG Tab Take 2 Tablets by mouth every day. (Patient not taking: Reported on 3/17/2025) 60 Tablet 11     No current facility-administered medications for this visit.

## 2025-08-28 ENCOUNTER — NON-PROVIDER VISIT (OUTPATIENT)
Dept: OCCUPATIONAL MEDICINE | Facility: CLINIC | Age: 30
End: 2025-08-28

## 2025-08-28 DIAGNOSIS — Z11.1 ENCOUNTER FOR PPD TEST: Primary | ICD-10-CM
